# Patient Record
Sex: FEMALE | Race: OTHER | Employment: PART TIME | URBAN - METROPOLITAN AREA
[De-identification: names, ages, dates, MRNs, and addresses within clinical notes are randomized per-mention and may not be internally consistent; named-entity substitution may affect disease eponyms.]

---

## 2017-01-04 ENCOUNTER — ROUTINE PRENATAL (OUTPATIENT)
Dept: OBGYN CLINIC | Facility: CLINIC | Age: 32
End: 2017-01-04

## 2017-01-04 VITALS
BODY MASS INDEX: 42 KG/M2 | HEART RATE: 103 BPM | WEIGHT: 263 LBS | DIASTOLIC BLOOD PRESSURE: 81 MMHG | SYSTOLIC BLOOD PRESSURE: 128 MMHG

## 2017-01-04 DIAGNOSIS — Z34.92 ENCOUNTER FOR SUPERVISION OF NORMAL PREGNANCY IN SECOND TRIMESTER, UNSPECIFIED GRAVIDITY: Primary | ICD-10-CM

## 2017-01-04 LAB
APPEARANCE: CLEAR
MULTISTIX EXPIRATION DATE: NORMAL DATE
MULTISTIX LOT#: NORMAL NUMERIC
PH, URINE: 5 (ref 4.5–8)
SPECIFIC GRAVITY: 1.03 (ref 1–1.03)
URINE-COLOR: YELLOW

## 2017-01-04 NOTE — PROGRESS NOTES
Nausea improving and no longer vomiting. Not taking Diclegis anymore since makes her too tired. She is eating small snacks frequently which is working well and she has now gained weight. US ordered today.  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

## 2017-01-25 ENCOUNTER — HOSPITAL ENCOUNTER (OUTPATIENT)
Dept: ULTRASOUND IMAGING | Age: 32
Discharge: HOME | End: 2017-01-25
Attending: OBSTETRICS & GYNECOLOGY
Payer: COMMERCIAL

## 2017-01-25 DIAGNOSIS — Z34.92 ENCOUNTER FOR SUPERVISION OF NORMAL PREGNANCY IN SECOND TRIMESTER, UNSPECIFIED GRAVIDITY: ICD-10-CM

## 2017-01-25 PROCEDURE — 76805 OB US >/= 14 WKS SNGL FETUS: CPT

## 2017-02-01 ENCOUNTER — TELEPHONE (OUTPATIENT)
Dept: OBGYN CLINIC | Facility: CLINIC | Age: 32
End: 2017-02-01

## 2017-02-01 ENCOUNTER — ROUTINE PRENATAL (OUTPATIENT)
Dept: OBGYN CLINIC | Facility: CLINIC | Age: 32
End: 2017-02-01

## 2017-02-01 VITALS
WEIGHT: 263 LBS | HEART RATE: 88 BPM | DIASTOLIC BLOOD PRESSURE: 73 MMHG | BODY MASS INDEX: 42 KG/M2 | SYSTOLIC BLOOD PRESSURE: 111 MMHG

## 2017-02-01 DIAGNOSIS — Z34.92 ENCOUNTER FOR SUPERVISION OF NORMAL PREGNANCY IN SECOND TRIMESTER, UNSPECIFIED GRAVIDITY: Primary | ICD-10-CM

## 2017-02-01 LAB
APPEARANCE: CLEAR
MULTISTIX EXPIRATION DATE: NORMAL DATE
MULTISTIX LOT#: NORMAL NUMERIC
PH, URINE: 5 (ref 4.5–8)
SPECIFIC GRAVITY: 1 (ref 1–1.03)
URINE-COLOR: YELLOW

## 2017-02-19 ENCOUNTER — HOSPITAL ENCOUNTER (OUTPATIENT)
Age: 32
Discharge: HOME | End: 2017-02-19
Attending: FAMILY MEDICINE
Payer: COMMERCIAL

## 2017-02-19 VITALS
BODY MASS INDEX: 42.43 KG/M2 | TEMPERATURE: 98 F | HEIGHT: 66 IN | RESPIRATION RATE: 19 BRPM | WEIGHT: 264 LBS | HEART RATE: 109 BPM | DIASTOLIC BLOOD PRESSURE: 84 MMHG | OXYGEN SATURATION: 100 % | SYSTOLIC BLOOD PRESSURE: 136 MMHG

## 2017-02-19 DIAGNOSIS — J11.1 INFLUENZA: Primary | ICD-10-CM

## 2017-02-19 LAB
FLUAV + FLUBV RNA SPEC NAA+PROBE: NEGATIVE

## 2017-02-19 PROCEDURE — 87631 RESP VIRUS 3-5 TARGETS: CPT | Performed by: FAMILY MEDICINE

## 2017-02-19 PROCEDURE — 99203 OFFICE O/P NEW LOW 30 MIN: CPT

## 2017-02-19 PROCEDURE — 99204 OFFICE O/P NEW MOD 45 MIN: CPT

## 2017-02-19 PROCEDURE — 99213 OFFICE O/P EST LOW 20 MIN: CPT

## 2017-02-19 RX ORDER — ALBUTEROL SULFATE 90 UG/1
2 AEROSOL, METERED RESPIRATORY (INHALATION) EVERY 4 HOURS PRN
Qty: 1 INHALER | Refills: 0 | Status: SHIPPED | OUTPATIENT
Start: 2017-02-19 | End: 2017-02-27

## 2017-02-19 RX ORDER — RANITIDINE 150 MG/1
150 TABLET ORAL 2 TIMES DAILY
COMMUNITY
End: 2017-11-21 | Stop reason: ALTCHOICE

## 2017-02-19 NOTE — ED PROVIDER NOTES
Patient Seen in: [address]    History   Patient presents with:  Cough/URI    Stated Complaint: cough congestion     Patient is a 27year old female presenting with fever. The history is provided by the patient.    Fever Negative for shortness of breath. Gastrointestinal: Negative for nausea, vomiting and diarrhea. Musculoskeletal: Positive for myalgias. Skin: Negative for rash. Neurological: Positive for headaches. All other systems reviewed and are negative. Clinical Impression:  Influenza  (primary encounter diagnosis)    Disposition:  Discharge    Follow-up:  Carlos Bearden MD  [address] [facility] [address] [phone]    In 5 days        Medications Prescribed:  Discharge Medication

## 2017-02-19 NOTE — ED INITIAL ASSESSMENT (HPI)
Pt presents to the IC with c/o fver of 102 last night, cough, expiratory wheeze and nasal congestion. Pt notes sore throat and bilateral ear discomfort.

## 2017-02-24 ENCOUNTER — APPOINTMENT (OUTPATIENT)
Dept: LAB | Age: 32
End: 2017-02-24
Attending: OBSTETRICS & GYNECOLOGY
Payer: COMMERCIAL

## 2017-02-24 DIAGNOSIS — Z34.92 ENCOUNTER FOR SUPERVISION OF NORMAL PREGNANCY IN SECOND TRIMESTER, UNSPECIFIED GRAVIDITY: ICD-10-CM

## 2017-02-24 LAB
ERYTHROCYTE [DISTWIDTH] IN BLOOD BY AUTOMATED COUNT: 14.2 % (ref 11–15)
GLUCOSE 1H P 50 G GLC PO SERPL-MCNC: 105 MG/DL
HCT VFR BLD AUTO: 33.2 % (ref 35–48)
HGB BLD-MCNC: 10.9 G/DL (ref 12–16)
MCH RBC QN AUTO: 27.4 PG (ref 27–32)
MCHC RBC AUTO-ENTMCNC: 32.9 G/DL (ref 32–37)
MCV RBC AUTO: 83.3 FL (ref 80–100)
PLATELET # BLD AUTO: 332 K/UL (ref 140–400)
PMV BLD AUTO: 8.5 FL (ref 7.4–10.3)
RBC # BLD AUTO: 3.99 M/UL (ref 3.7–5.4)
WBC # BLD AUTO: 15.3 K/UL (ref 4–11)

## 2017-02-24 PROCEDURE — 36415 COLL VENOUS BLD VENIPUNCTURE: CPT

## 2017-02-24 PROCEDURE — 82950 GLUCOSE TEST: CPT

## 2017-02-24 PROCEDURE — 85027 COMPLETE CBC AUTOMATED: CPT

## 2017-02-27 ENCOUNTER — ROUTINE PRENATAL (OUTPATIENT)
Dept: OBGYN CLINIC | Facility: CLINIC | Age: 32
End: 2017-02-27

## 2017-02-27 VITALS
WEIGHT: 266.19 LBS | HEART RATE: 94 BPM | SYSTOLIC BLOOD PRESSURE: 121 MMHG | BODY MASS INDEX: 43 KG/M2 | DIASTOLIC BLOOD PRESSURE: 77 MMHG

## 2017-02-27 DIAGNOSIS — Z34.92 ENCOUNTER FOR SUPERVISION OF NORMAL PREGNANCY IN SECOND TRIMESTER, UNSPECIFIED GRAVIDITY: Primary | ICD-10-CM

## 2017-02-27 PROBLEM — Z34.90 NORMAL PRENATAL ULTRASOUND (HCC): Status: ACTIVE | Noted: 2017-02-27

## 2017-02-27 PROBLEM — Z34.90 NORMAL PRENATAL ULTRASOUND: Status: ACTIVE | Noted: 2017-02-27

## 2017-02-27 LAB
MULTISTIX LOT#: NORMAL NUMERIC
PH, URINE: 5 (ref 4.5–8)
PROTEIN (URINE DIPSTICK): 30 MG/DL
SPECIFIC GRAVITY: 1.02 (ref 1–1.03)
UROBILINOGEN,SEMI-QN: 0.2 MG/DL (ref 0–1.9)

## 2017-03-14 ENCOUNTER — ROUTINE PRENATAL (OUTPATIENT)
Dept: OBGYN CLINIC | Facility: CLINIC | Age: 32
End: 2017-03-14

## 2017-03-14 VITALS
BODY MASS INDEX: 43 KG/M2 | SYSTOLIC BLOOD PRESSURE: 116 MMHG | DIASTOLIC BLOOD PRESSURE: 73 MMHG | HEART RATE: 90 BPM | WEIGHT: 264.81 LBS

## 2017-03-14 DIAGNOSIS — Z34.93 ENCOUNTER FOR SUPERVISION OF NORMAL PREGNANCY IN THIRD TRIMESTER, UNSPECIFIED GRAVIDITY: Primary | ICD-10-CM

## 2017-03-14 LAB
MULTISTIX LOT#: NORMAL NUMERIC
SPECIFIC GRAVITY: 1.02 (ref 1–1.03)
UROBILINOGEN,SEMI-QN: 0.2 MG/DL (ref 0–1.9)

## 2017-03-14 PROCEDURE — 90471 IMMUNIZATION ADMIN: CPT | Performed by: OBSTETRICS & GYNECOLOGY

## 2017-03-14 PROCEDURE — 90715 TDAP VACCINE 7 YRS/> IM: CPT | Performed by: OBSTETRICS & GYNECOLOGY

## 2017-03-29 ENCOUNTER — ROUTINE PRENATAL (OUTPATIENT)
Dept: OBGYN CLINIC | Facility: CLINIC | Age: 32
End: 2017-03-29

## 2017-03-29 VITALS
DIASTOLIC BLOOD PRESSURE: 75 MMHG | SYSTOLIC BLOOD PRESSURE: 111 MMHG | HEART RATE: 103 BPM | BODY MASS INDEX: 44 KG/M2 | WEIGHT: 270 LBS

## 2017-03-29 DIAGNOSIS — Z34.93 ENCOUNTER FOR SUPERVISION OF NORMAL PREGNANCY IN THIRD TRIMESTER, UNSPECIFIED GRAVIDITY: Primary | ICD-10-CM

## 2017-03-29 LAB
MULTISTIX LOT#: NORMAL NUMERIC
PH, URINE: 5 (ref 4.5–8)
SPECIFIC GRAVITY: 1.02 (ref 1–1.03)

## 2017-04-05 ENCOUNTER — HOSPITAL ENCOUNTER (OUTPATIENT)
Dept: ULTRASOUND IMAGING | Age: 32
Discharge: HOME | End: 2017-04-05
Attending: OBSTETRICS & GYNECOLOGY
Payer: COMMERCIAL

## 2017-04-05 DIAGNOSIS — Z34.93 ENCOUNTER FOR SUPERVISION OF NORMAL PREGNANCY IN THIRD TRIMESTER, UNSPECIFIED GRAVIDITY: ICD-10-CM

## 2017-04-05 PROCEDURE — 76816 OB US FOLLOW-UP PER FETUS: CPT

## 2017-04-10 ENCOUNTER — ROUTINE PRENATAL (OUTPATIENT)
Dept: OBGYN CLINIC | Facility: CLINIC | Age: 32
End: 2017-04-10

## 2017-04-10 VITALS
BODY MASS INDEX: 43 KG/M2 | DIASTOLIC BLOOD PRESSURE: 72 MMHG | HEART RATE: 94 BPM | SYSTOLIC BLOOD PRESSURE: 108 MMHG | WEIGHT: 268.38 LBS

## 2017-04-10 DIAGNOSIS — Z34.93 ENCOUNTER FOR SUPERVISION OF NORMAL PREGNANCY IN THIRD TRIMESTER, UNSPECIFIED GRAVIDITY: Primary | ICD-10-CM

## 2017-04-20 ENCOUNTER — PATIENT MESSAGE (OUTPATIENT)
Dept: OBGYN CLINIC | Facility: CLINIC | Age: 32
End: 2017-04-20

## 2017-04-20 NOTE — TELEPHONE ENCOUNTER
From: Camden Spear  To: Yury Barnes MD  Sent: 4/20/2017 3:20 PM CDT  Subject: Prescription Question    Hi. I've been dealing with a bad yeast infection for the past week now.  I did finish the coarse of over the counter Monistat 7 but my symptoms are

## 2017-04-21 RX ORDER — FLUCONAZOLE 150 MG/1
150 TABLET ORAL ONCE
Qty: 1 TABLET | Refills: 0 | Status: SHIPPED | OUTPATIENT
Start: 2017-04-21 | End: 2017-04-21

## 2017-04-24 ENCOUNTER — ROUTINE PRENATAL (OUTPATIENT)
Dept: OBGYN CLINIC | Facility: CLINIC | Age: 32
End: 2017-04-24

## 2017-04-24 VITALS
WEIGHT: 268.19 LBS | HEART RATE: 92 BPM | DIASTOLIC BLOOD PRESSURE: 65 MMHG | SYSTOLIC BLOOD PRESSURE: 117 MMHG | BODY MASS INDEX: 43 KG/M2

## 2017-04-24 DIAGNOSIS — Z34.83 ENCOUNTER FOR SUPERVISION OF OTHER NORMAL PREGNANCY IN THIRD TRIMESTER: Primary | ICD-10-CM

## 2017-04-28 ENCOUNTER — HOSPITAL ENCOUNTER (OUTPATIENT)
Dept: ULTRASOUND IMAGING | Age: 32
Discharge: HOME | End: 2017-04-28
Attending: OBSTETRICS & GYNECOLOGY
Payer: COMMERCIAL

## 2017-04-28 ENCOUNTER — APPOINTMENT (OUTPATIENT)
Dept: LAB | Age: 32
End: 2017-04-28
Attending: OBSTETRICS & GYNECOLOGY
Payer: COMMERCIAL

## 2017-04-28 DIAGNOSIS — Z34.93 ENCOUNTER FOR SUPERVISION OF NORMAL PREGNANCY IN THIRD TRIMESTER, UNSPECIFIED GRAVIDITY: ICD-10-CM

## 2017-04-28 PROCEDURE — 85027 COMPLETE CBC AUTOMATED: CPT

## 2017-04-28 PROCEDURE — 86780 TREPONEMA PALLIDUM: CPT

## 2017-04-28 PROCEDURE — 76816 OB US FOLLOW-UP PER FETUS: CPT

## 2017-04-28 PROCEDURE — 36415 COLL VENOUS BLD VENIPUNCTURE: CPT

## 2017-05-01 ENCOUNTER — ROUTINE PRENATAL (OUTPATIENT)
Dept: OBGYN CLINIC | Facility: CLINIC | Age: 32
End: 2017-05-01

## 2017-05-01 VITALS
WEIGHT: 272 LBS | DIASTOLIC BLOOD PRESSURE: 71 MMHG | SYSTOLIC BLOOD PRESSURE: 111 MMHG | HEART RATE: 93 BPM | BODY MASS INDEX: 44 KG/M2

## 2017-05-01 DIAGNOSIS — Z34.93 ENCOUNTER FOR SUPERVISION OF NORMAL PREGNANCY IN THIRD TRIMESTER, UNSPECIFIED GRAVIDITY: Primary | ICD-10-CM

## 2017-05-10 ENCOUNTER — ROUTINE PRENATAL (OUTPATIENT)
Dept: OBGYN CLINIC | Facility: CLINIC | Age: 32
End: 2017-05-10

## 2017-05-10 VITALS
HEART RATE: 90 BPM | WEIGHT: 272.63 LBS | DIASTOLIC BLOOD PRESSURE: 70 MMHG | SYSTOLIC BLOOD PRESSURE: 108 MMHG | BODY MASS INDEX: 44 KG/M2

## 2017-05-10 DIAGNOSIS — Z34.93 ENCOUNTER FOR SUPERVISION OF NORMAL PREGNANCY IN THIRD TRIMESTER, UNSPECIFIED GRAVIDITY: Primary | ICD-10-CM

## 2017-05-11 ENCOUNTER — HOSPITAL ENCOUNTER (OUTPATIENT)
Facility: HOSPITAL | Age: 32
Setting detail: OBSERVATION
Discharge: HOME | End: 2017-05-11
Attending: OBSTETRICS & GYNECOLOGY | Admitting: OBSTETRICS & GYNECOLOGY
Payer: COMMERCIAL

## 2017-05-11 VITALS — HEART RATE: 98 BPM | SYSTOLIC BLOOD PRESSURE: 111 MMHG | DIASTOLIC BLOOD PRESSURE: 67 MMHG

## 2017-05-11 PROBLEM — O36.8190 DECREASED FETAL MOVEMENT: Status: ACTIVE | Noted: 2017-05-11

## 2017-05-11 PROBLEM — O36.8190 DECREASED FETAL MOVEMENT (HCC): Status: ACTIVE | Noted: 2017-05-11

## 2017-05-11 PROCEDURE — 59025 FETAL NON-STRESS TEST: CPT | Performed by: OBSTETRICS & GYNECOLOGY

## 2017-05-12 NOTE — TRIAGE
[facility] HOSP - [facility]      Triage Note    Jose E Loera Patient Status:  Observation    1985 MRN [id number]   Location [address] C-D Attending Stephania Toussaint MD   Hosp Day # 0 PCP MD Larissa Walters: [id number]   Est MD    5/17/2017    1:19 PM

## 2017-05-16 ENCOUNTER — ROUTINE PRENATAL (OUTPATIENT)
Dept: OBGYN CLINIC | Facility: CLINIC | Age: 32
End: 2017-05-16

## 2017-05-16 VITALS
WEIGHT: 272 LBS | SYSTOLIC BLOOD PRESSURE: 109 MMHG | DIASTOLIC BLOOD PRESSURE: 72 MMHG | HEART RATE: 94 BPM | BODY MASS INDEX: 44 KG/M2

## 2017-05-16 DIAGNOSIS — Z34.93 ENCOUNTER FOR SUPERVISION OF NORMAL PREGNANCY IN THIRD TRIMESTER, UNSPECIFIED GRAVIDITY: Primary | ICD-10-CM

## 2017-05-22 ENCOUNTER — HOSPITAL ENCOUNTER (OUTPATIENT)
Facility: HOSPITAL | Age: 32
Setting detail: OBSERVATION
Discharge: HOME | End: 2017-05-22
Attending: OBSTETRICS & GYNECOLOGY | Admitting: OBSTETRICS & GYNECOLOGY
Payer: COMMERCIAL

## 2017-05-22 ENCOUNTER — TELEPHONE (OUTPATIENT)
Dept: OBGYN CLINIC | Facility: CLINIC | Age: 32
End: 2017-05-22

## 2017-05-22 VITALS — HEART RATE: 98 BPM | DIASTOLIC BLOOD PRESSURE: 73 MMHG | SYSTOLIC BLOOD PRESSURE: 129 MMHG

## 2017-05-22 PROBLEM — O47.9 IRREGULAR CONTRACTIONS: Status: ACTIVE | Noted: 2017-05-22

## 2017-05-22 PROBLEM — O47.9 IRREGULAR CONTRACTIONS (HCC): Status: ACTIVE | Noted: 2017-05-22

## 2017-05-22 PROCEDURE — 59025 FETAL NON-STRESS TEST: CPT | Performed by: OBSTETRICS & GYNECOLOGY

## 2017-05-22 NOTE — TRIAGE
[facility]      Triage Note    Celia Florian Patient Status:  Observation    1985 MRN [id number]   Location [address] C-D Attending Krystal Robles, [address] Day # 0 PCP MD Davonte Saleh Brow: [id number]

## 2017-05-22 NOTE — TELEPHONE ENCOUNTER
Pt 38w6d calling to report contractions that have gotten increasingly stronger. Pt stated that contractions started around 11pm last night and having been coming consistently every 2-3 minutes, lasting 45-60 seconds.  Pt stated that last night she was able

## 2017-05-24 ENCOUNTER — ROUTINE PRENATAL (OUTPATIENT)
Dept: OBGYN CLINIC | Facility: CLINIC | Age: 32
End: 2017-05-24

## 2017-05-24 VITALS
WEIGHT: 271 LBS | BODY MASS INDEX: 44 KG/M2 | DIASTOLIC BLOOD PRESSURE: 75 MMHG | HEART RATE: 99 BPM | SYSTOLIC BLOOD PRESSURE: 120 MMHG

## 2017-05-24 DIAGNOSIS — Z34.93 ENCOUNTER FOR SUPERVISION OF NORMAL PREGNANCY IN THIRD TRIMESTER, UNSPECIFIED GRAVIDITY: Primary | ICD-10-CM

## 2017-05-24 PROCEDURE — 59426 ANTEPARTUM CARE ONLY: CPT | Performed by: OBSTETRICS & GYNECOLOGY

## 2017-05-27 ENCOUNTER — HOSPITAL ENCOUNTER (INPATIENT)
Facility: HOSPITAL | Age: 32
LOS: 1 days | Discharge: HOME | End: 2017-05-28
Attending: OBSTETRICS & GYNECOLOGY | Admitting: OBSTETRICS & GYNECOLOGY
Payer: COMMERCIAL

## 2017-05-27 ENCOUNTER — ANESTHESIA (OUTPATIENT)
Dept: OBGYN UNIT | Facility: HOSPITAL | Age: 32
End: 2017-05-27
Payer: COMMERCIAL

## 2017-05-27 ENCOUNTER — ANESTHESIA EVENT (OUTPATIENT)
Dept: OBGYN UNIT | Facility: HOSPITAL | Age: 32
End: 2017-05-27
Payer: COMMERCIAL

## 2017-05-27 PROBLEM — Z34.90 PREGNANCY (HCC): Status: ACTIVE | Noted: 2017-05-27

## 2017-05-27 PROBLEM — Z34.90 PREGNANCY: Status: ACTIVE | Noted: 2017-05-27

## 2017-05-27 PROCEDURE — 59410 OBSTETRICAL CARE: CPT | Performed by: OBSTETRICS & GYNECOLOGY

## 2017-05-27 RX ORDER — PRENATAL VIT,CAL 76/IRON/FOLIC 29 MG-1 MG
1 TABLET ORAL DAILY
Status: DISCONTINUED | OUTPATIENT
Start: 2017-05-27 | End: 2017-05-28

## 2017-05-27 RX ORDER — DIAPER,BRIEF,INFANT-TODD,DISP
1 EACH MISCELLANEOUS EVERY 6 HOURS PRN
Status: DISCONTINUED | OUTPATIENT
Start: 2017-05-27 | End: 2017-05-28

## 2017-05-27 RX ORDER — EPHEDRINE SULFATE/0.9% NACL/PF 25 MG/5 ML
SYRINGE (ML) INTRAVENOUS
Status: DISCONTINUED
Start: 2017-05-27 | End: 2017-05-27 | Stop reason: WASHOUT

## 2017-05-27 RX ORDER — SODIUM CHLORIDE 0.9 % (FLUSH) 0.9 %
10 SYRINGE (ML) INJECTION AS NEEDED
Status: DISCONTINUED | OUTPATIENT
Start: 2017-05-27 | End: 2017-05-27 | Stop reason: HOSPADM

## 2017-05-27 RX ORDER — AMMONIA INHALANTS 0.04 G/.3ML
0.3 INHALANT RESPIRATORY (INHALATION) AS NEEDED
Status: DISCONTINUED | OUTPATIENT
Start: 2017-05-27 | End: 2017-05-28

## 2017-05-27 RX ORDER — NALBUPHINE HCL 10 MG/ML
2.5 AMPUL (ML) INJECTION
Status: DISCONTINUED | OUTPATIENT
Start: 2017-05-27 | End: 2017-05-28

## 2017-05-27 RX ORDER — BUPIVACAINE HYDROCHLORIDE 2.5 MG/ML
INJECTION, SOLUTION EPIDURAL; INFILTRATION; INTRACAUDAL
Status: DISPENSED
Start: 2017-05-27 | End: 2017-05-27

## 2017-05-27 RX ORDER — TERBUTALINE SULFATE 1 MG/ML
0.25 INJECTION, SOLUTION SUBCUTANEOUS AS NEEDED
Status: DISCONTINUED | OUTPATIENT
Start: 2017-05-27 | End: 2017-05-27 | Stop reason: HOSPADM

## 2017-05-27 RX ORDER — DEXTROSE, SODIUM CHLORIDE, SODIUM LACTATE, POTASSIUM CHLORIDE, AND CALCIUM CHLORIDE 5; .6; .31; .03; .02 G/100ML; G/100ML; G/100ML; G/100ML; G/100ML
INJECTION, SOLUTION INTRAVENOUS
Status: COMPLETED
Start: 2017-05-27 | End: 2017-05-27

## 2017-05-27 RX ORDER — HYDROCODONE BITARTRATE AND ACETAMINOPHEN 5; 325 MG/1; MG/1
1 TABLET ORAL EVERY 6 HOURS PRN
Status: DISCONTINUED | OUTPATIENT
Start: 2017-05-27 | End: 2017-05-28

## 2017-05-27 RX ORDER — EPHEDRINE SULFATE/0.9% NACL/PF 25 MG/5 ML
5 SYRINGE (ML) INTRAVENOUS AS NEEDED
Status: DISCONTINUED | OUTPATIENT
Start: 2017-05-27 | End: 2017-05-28

## 2017-05-27 RX ORDER — PHENYLEPHRINE HCL IN 0.9% NACL 0.5 MG/5ML
SYRINGE (ML) INTRAVENOUS
Status: DISCONTINUED
Start: 2017-05-27 | End: 2017-05-27 | Stop reason: WASHOUT

## 2017-05-27 RX ORDER — BUPIVACAINE HYDROCHLORIDE 2.5 MG/ML
INJECTION, SOLUTION EPIDURAL; INFILTRATION; INTRACAUDAL AS NEEDED
Status: DISCONTINUED | OUTPATIENT
Start: 2017-05-27 | End: 2017-05-27 | Stop reason: SURG

## 2017-05-27 RX ORDER — DEXTROSE, SODIUM CHLORIDE, SODIUM LACTATE, POTASSIUM CHLORIDE, AND CALCIUM CHLORIDE 5; .6; .31; .03; .02 G/100ML; G/100ML; G/100ML; G/100ML; G/100ML
125 INJECTION, SOLUTION INTRAVENOUS CONTINUOUS
Status: DISCONTINUED | OUTPATIENT
Start: 2017-05-27 | End: 2017-05-27 | Stop reason: HOSPADM

## 2017-05-27 RX ORDER — AMMONIA INHALANTS 0.04 G/.3ML
0.3 INHALANT RESPIRATORY (INHALATION) AS NEEDED
Status: DISCONTINUED | OUTPATIENT
Start: 2017-05-27 | End: 2017-05-27 | Stop reason: HOSPADM

## 2017-05-27 RX ORDER — IBUPROFEN 600 MG/1
600 TABLET ORAL ONCE AS NEEDED
Status: DISCONTINUED | OUTPATIENT
Start: 2017-05-27 | End: 2017-05-27 | Stop reason: HOSPADM

## 2017-05-27 RX ORDER — SODIUM CHLORIDE, SODIUM LACTATE, POTASSIUM CHLORIDE, CALCIUM CHLORIDE 600; 310; 30; 20 MG/100ML; MG/100ML; MG/100ML; MG/100ML
INJECTION, SOLUTION INTRAVENOUS CONTINUOUS
Status: DISCONTINUED | OUTPATIENT
Start: 2017-05-27 | End: 2017-05-27 | Stop reason: HOSPADM

## 2017-05-27 RX ORDER — DOCUSATE SODIUM 100 MG/1
100 CAPSULE, LIQUID FILLED ORAL 2 TIMES DAILY
Status: DISCONTINUED | OUTPATIENT
Start: 2017-05-27 | End: 2017-05-28

## 2017-05-27 RX ORDER — SIMETHICONE 80 MG
80 TABLET,CHEWABLE ORAL 3 TIMES DAILY PRN
Status: DISCONTINUED | OUTPATIENT
Start: 2017-05-27 | End: 2017-05-28

## 2017-05-27 RX ORDER — ONDANSETRON 2 MG/ML
4 INJECTION INTRAMUSCULAR; INTRAVENOUS EVERY 6 HOURS PRN
Status: DISCONTINUED | OUTPATIENT
Start: 2017-05-27 | End: 2017-05-27

## 2017-05-27 RX ORDER — EPHEDRINE SULFATE/0.9% NACL/PF 25 MG/5 ML
SYRINGE (ML) INTRAVENOUS
Status: DISPENSED
Start: 2017-05-27 | End: 2017-05-27

## 2017-05-27 RX ORDER — LIDOCAINE HYDROCHLORIDE 10 MG/ML
30 INJECTION, SOLUTION EPIDURAL; INFILTRATION; INTRACAUDAL; PERINEURAL ONCE
Status: DISCONTINUED | OUTPATIENT
Start: 2017-05-27 | End: 2017-05-27 | Stop reason: HOSPADM

## 2017-05-27 RX ORDER — ONDANSETRON 2 MG/ML
4 INJECTION INTRAMUSCULAR; INTRAVENOUS EVERY 6 HOURS PRN
Status: DISCONTINUED | OUTPATIENT
Start: 2017-05-27 | End: 2017-05-28

## 2017-05-27 RX ORDER — IBUPROFEN 600 MG/1
600 TABLET ORAL EVERY 6 HOURS PRN
Status: DISCONTINUED | OUTPATIENT
Start: 2017-05-27 | End: 2017-05-28

## 2017-05-27 RX ORDER — BISACODYL 10 MG
10 SUPPOSITORY, RECTAL RECTAL ONCE AS NEEDED
Status: DISCONTINUED | OUTPATIENT
Start: 2017-05-27 | End: 2017-05-28

## 2017-05-27 RX ORDER — SODIUM CHLORIDE, SODIUM LACTATE, POTASSIUM CHLORIDE, CALCIUM CHLORIDE 600; 310; 30; 20 MG/100ML; MG/100ML; MG/100ML; MG/100ML
INJECTION, SOLUTION INTRAVENOUS
Status: COMPLETED
Start: 2017-05-27 | End: 2017-05-27

## 2017-05-27 RX ORDER — SODIUM CHLORIDE 0.9 % (FLUSH) 0.9 %
10 SYRINGE (ML) INJECTION AS NEEDED
Status: DISCONTINUED | OUTPATIENT
Start: 2017-05-27 | End: 2017-05-28

## 2017-05-27 RX ADMIN — BUPIVACAINE HYDROCHLORIDE 10 ML: 2.5 INJECTION, SOLUTION EPIDURAL; INFILTRATION; INTRACAUDAL at 11:26:00

## 2017-05-27 NOTE — DISCHARGE SUMMARY
[facility] HOSP - [facility]    Discharge Summary    Martha Deleon Patient Status:  Inpatient    1985 MRN [id number]   Location [address] C-D Attending Janusz Ann MD   Hosp Day # 0       Admit date:  2017    Discharge date:

## 2017-05-27 NOTE — PROGRESS NOTES
Patient up to bathroom with assist x 2. Voided 350 ml   Patient transferred to mother/baby room # 350 per wheelchair in stable condition with baby and personal belongings. Accompanied by significant other and staff. Report given to mother/baby RN.

## 2017-05-27 NOTE — PROGRESS NOTES
Arrived to find baby had delivered w/in 5 min of RN notifing me that patient complete at 46  -- baby delivered by [phone]. Per in house doctor baby's head delivered even before midwife assisted with delivery. Pt did not push at all. No tears.

## 2017-05-27 NOTE — ANESTHESIA PROCEDURE NOTES
Labor Analgesia  Performed by: Allan Gauthier  Authorized by: Allan Gauthier    Patient Location:  OB  Start Time:  5/27/2017 11:55 AM  End Time:  5/27/2017 11:25 AM  Reason for Block: labor epidural    Anesthesiologist:  Allan Gauthier  Per

## 2017-05-27 NOTE — H&P
[address] [facility] Patient Status:  Inpatient    1985 MRN [id number]   Location [address] [facility] [address] C-D Attending Simon Sam MD   Hosp Day # 0 PCP Abdias Del Rosario MD     Date of Admission:   Quit date: 02/03/2009    Smokeless tobacco: Former User    Comment: per [address] - no    Alcohol Use: No    Comment: None. Allergies/Medications:    Allergies:   No Known Allergies    Medications:    Prescriptions prior to admission:  IRON, FERROUS S SROM    Obstetrical history significant for obesity.  Patient Active Problem List:     Vertigo     Gastroesophageal reflux disease without esophagitis     BMI 40.0-44.9, adult (HCC)     ultrasound     Decreased fetal movement     Irregular contractions

## 2017-05-27 NOTE — ANESTHESIA PREPROCEDURE EVALUATION
Anesthesia PreOp Note    HPI:     Maya Kim is a 27year old female who presents for preoperative consultation requested by: * No surgeons listed *    Date of Surgery: 5/27/2017    * No procedures listed *  Indication: * No pre-op diagnosis entered * 125 mL/hr at 05/27/17 0715   Lidocaine HCl (PF) (XYLOCAINE) 1 % injection SOLN 30 mL 30 mL Intradermal Once Indu MD Jose     ibuprofen (MOTRIN) tab 600 mg 600 mg Oral Once PRN Indu Garcia MD     oxyTOCIN (PITOCIN) 30 units/ 500 ml premix infusion Name: N/A    Years of Education: N/A  Number of Children: 1     Occupational History  RN Oncology  [employer]     Social History Main Topics   Smoking status: Former Smoker     Quit date: 02/03/2009    Smokeless tobacco: Former User    Comment: per [employer] - no the best of my ability. The patient desires the anesthetic management as planned.   RHETT VANESSA  5/27/2017 11:59 AM

## 2017-05-27 NOTE — L&D DELIVERY NOTE
Mother's Information           [facility]  [[id number]]     Labor Events     labor?:  No    steroids?:  None   Antibiotics received during labor?:  No   Rupture date:  17  Rupture time:  0500   Rupture type:  SROM   Fluid color:  Charisse tone Limp Some flexion Active motion    Respiratory effort Absent Weak cry; hypoventilation Good, crying                    1 Minute:   5 Minute:   10 Minute:   15 Minute:   20 Minute:     Skin color:   Heart rate:   Reflex irritability:   Muscle tone:   R

## 2017-05-27 NOTE — PROGRESS NOTES
Noted head  and voluntarily pushing @ 1402,called for help,cecilia Gomez cnm  And MARIE fajardo Rn incharge to  immediately and delivered the baby @ 1404,and Dr Anna Lawrence also to .placenta was delivered by dr Anna Lawrence @ 0006. Lalita Velasquez

## 2017-05-28 VITALS
DIASTOLIC BLOOD PRESSURE: 73 MMHG | SYSTOLIC BLOOD PRESSURE: 112 MMHG | OXYGEN SATURATION: 100 % | HEART RATE: 80 BPM | TEMPERATURE: 98 F | RESPIRATION RATE: 14 BRPM

## 2017-05-28 NOTE — ANESTHESIA POSTPROCEDURE EVALUATION
Patient: Maya Kim    Procedure Summary     Date Anesthesia Start Anesthesia Stop Room / Location    05/27/17 1093 0586        Procedure Diagnosis Scheduled Providers Responsible Provider    LABOR ANALGESIA No diagnosis on file.   Carissa Huffman

## 2017-05-28 NOTE — LACTATION NOTE
This note was copied from the chart of [address].   LACTATION NOTE - INFANT    Evaluation Type  Evaluation Type: Inpatient    Problems & Assessment  Problems Diagnosed or Identified: Shallow latch  Problems: comment/detail: tongue thrust  Infant Assessmen

## 2017-05-28 NOTE — SPIRITUAL CARE NOTE
Pt was inprocess of leaving the hospital.  She was already at the [facility].  ran down to [facility]. Offered congratulations and best wishes for the baby boy. 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   She was in labor 5 days before the birth, so now she only

## 2017-05-28 NOTE — LACTATION NOTE
LACTATION NOTE - MOTHER                Maternal history  Maternal history: Polycystic ovarian syndrome (PCOS);Obesity; Anxiety;Depression  Other/comment: Benign brain tumor    Breastfeeding goal  Breastfeeding goal: To maintain breast milk feeding per patie

## 2017-07-10 ENCOUNTER — POSTPARTUM (OUTPATIENT)
Dept: OBGYN CLINIC | Facility: CLINIC | Age: 32
End: 2017-07-10

## 2017-07-10 VITALS
HEART RATE: 88 BPM | DIASTOLIC BLOOD PRESSURE: 77 MMHG | WEIGHT: 252 LBS | SYSTOLIC BLOOD PRESSURE: 110 MMHG | BODY MASS INDEX: 41 KG/M2

## 2017-07-10 PROBLEM — Z34.90 NORMAL PRENATAL ULTRASOUND: Status: RESOLVED | Noted: 2017-02-27 | Resolved: 2017-05-27

## 2017-07-10 PROBLEM — Z34.90 NORMAL PRENATAL ULTRASOUND (HCC): Status: RESOLVED | Noted: 2017-02-27 | Resolved: 2017-05-27

## 2017-07-11 NOTE — PROGRESS NOTES
HPI    Celia Florian is a 27year old female  here for 6 week post-partum visit. Patient delivered a  male infant on 17 via . Precipitous delivery -- baby caught by in house Patient desires nothing for contraception.   Patient is breast fee lb (114.3 kg), last menstrual period 09/01/2016, currently breastfeeding.   General:  Well nourished, well developed woman in no acute distress  Abdomen:  soft, nontender, no masses  External Genitalia: normal appearance, hair distribution, and no lesions

## 2017-09-17 ENCOUNTER — PATIENT MESSAGE (OUTPATIENT)
Dept: OBGYN CLINIC | Facility: CLINIC | Age: 32
End: 2017-09-17

## 2017-09-18 NOTE — TELEPHONE ENCOUNTER
From: Kieran Contreras  To: Lexie Morton DO  Sent: 9/17/2017 2:22 PM CDT  Subject: Prescription Question    I have another yeast infection. Can you please call in a dose of diflucan? Thanks!

## 2017-09-18 NOTE — TELEPHONE ENCOUNTER
I CALLED PT TO FIND OUT WHEN SHE LAST HAD DIFLUCAN. IT WAS GIVEN ON 4-21-17 BY MAREN. PT STATES OTC RADHAT NEVER WORKS FOR HER. WILL YOU FILL AGAIN OR DOES SHE NEED TO BE SEEN. THERE IS NO VAG CULTURE IN THE PAST YEAR DIAGNOSING A YEAST INFECTION.

## 2017-09-20 ENCOUNTER — PATIENT MESSAGE (OUTPATIENT)
Dept: OBGYN CLINIC | Facility: CLINIC | Age: 32
End: 2017-09-20

## 2017-09-20 RX ORDER — FLUCONAZOLE 150 MG/1
150 TABLET ORAL ONCE
Qty: 1 TABLET | Refills: 0 | Status: SHIPPED | OUTPATIENT
Start: 2017-09-20 | End: 2017-09-20

## 2017-09-20 NOTE — TELEPHONE ENCOUNTER
From: Marylen Neigh  To: Vilma Peterson DO  Sent: 9/20/2017 12:52 PM CDT  Subject: Prescription Question    No I am not breastfeeding.  Thank you

## 2017-10-09 ENCOUNTER — OFFICE VISIT (OUTPATIENT)
Dept: FAMILY MEDICINE CLINIC | Facility: CLINIC | Age: 32
End: 2017-10-09

## 2017-10-09 VITALS
HEIGHT: 66 IN | SYSTOLIC BLOOD PRESSURE: 121 MMHG | DIASTOLIC BLOOD PRESSURE: 69 MMHG | WEIGHT: 265 LBS | BODY MASS INDEX: 42.59 KG/M2 | TEMPERATURE: 98 F | HEART RATE: 84 BPM

## 2017-10-09 DIAGNOSIS — N76.0 ACUTE VAGINITIS: Primary | ICD-10-CM

## 2017-10-09 PROCEDURE — 99213 OFFICE O/P EST LOW 20 MIN: CPT | Performed by: PHYSICIAN ASSISTANT

## 2017-10-09 PROCEDURE — 99212 OFFICE O/P EST SF 10 MIN: CPT | Performed by: PHYSICIAN ASSISTANT

## 2017-10-09 RX ORDER — FLUCONAZOLE 150 MG/1
TABLET ORAL
Qty: 2 TABLET | Refills: 1 | Status: SHIPPED | OUTPATIENT
Start: 2017-10-09 | End: 2017-11-21 | Stop reason: ALTCHOICE

## 2017-10-09 NOTE — PROGRESS NOTES
HPI:    Patient ID: Usman Weaver is a 27year old female. Patient presents for itching and inflammation of vaginal area for past few days. She states has same symptoms she did few weeks ago that resolved within one day of taking oral fluconazole.   She or worsen.  -Patient would like to defer vaginal culture at this time. No orders of the defined types were placed in this encounter.       Meds This Visit:  Signed Prescriptions Disp Refills    fluconazole (DIFLUCAN) 150 MG Oral Tab 2 tablet 1      Sig

## 2017-11-13 ENCOUNTER — TELEPHONE (OUTPATIENT)
Dept: PEDIATRICS CLINIC | Facility: CLINIC | Age: 32
End: 2017-11-13

## 2017-11-13 DIAGNOSIS — Z34.90 PREGNANCY, UNSPECIFIED GESTATIONAL AGE: Primary | ICD-10-CM

## 2017-11-13 NOTE — TELEPHONE ENCOUNTER
Pt calling to report +HPT and LMP of 9/18/17. Pt delivered with our practice in May of this year. Pt familiar with rotating through all MDs.  Pt stated she stopped breastfeeding in September and then got her first period since delivery and then found out sh

## 2017-11-21 ENCOUNTER — HOSPITAL ENCOUNTER (OUTPATIENT)
Dept: ULTRASOUND IMAGING | Age: 32
Discharge: HOME | End: 2017-11-21
Attending: OBSTETRICS & GYNECOLOGY
Payer: COMMERCIAL

## 2017-11-21 ENCOUNTER — OFFICE VISIT (OUTPATIENT)
Dept: FAMILY MEDICINE CLINIC | Facility: CLINIC | Age: 32
End: 2017-11-21

## 2017-11-21 VITALS
HEART RATE: 89 BPM | HEIGHT: 66 IN | SYSTOLIC BLOOD PRESSURE: 117 MMHG | WEIGHT: 265 LBS | TEMPERATURE: 98 F | DIASTOLIC BLOOD PRESSURE: 70 MMHG | BODY MASS INDEX: 42.59 KG/M2

## 2017-11-21 DIAGNOSIS — Z34.90 PREGNANCY, UNSPECIFIED GESTATIONAL AGE: ICD-10-CM

## 2017-11-21 DIAGNOSIS — Z00.00 PHYSICAL EXAM, ANNUAL: Primary | ICD-10-CM

## 2017-11-21 PROCEDURE — 99395 PREV VISIT EST AGE 18-39: CPT | Performed by: FAMILY MEDICINE

## 2017-11-21 PROCEDURE — 76801 OB US < 14 WKS SINGLE FETUS: CPT | Performed by: OBSTETRICS & GYNECOLOGY

## 2017-11-21 RX ORDER — OMEPRAZOLE 20 MG/1
20 CAPSULE, DELAYED RELEASE ORAL
COMMUNITY
End: 2017-11-27

## 2017-11-21 NOTE — PROGRESS NOTES
HPI:    Patient ID: Carito Wilkins is a 27year old female. Doing well. Pregnant. No chest pain no shortness of breath. Review of Systems   Constitutional: Negative. Negative for activity change, appetite change and chills. HENT: Negative.

## 2017-11-24 ENCOUNTER — LAB ENCOUNTER (OUTPATIENT)
Dept: LAB | Age: 32
End: 2017-11-24
Attending: FAMILY MEDICINE
Payer: COMMERCIAL

## 2017-11-24 DIAGNOSIS — Z00.00 PHYSICAL EXAM, ANNUAL: ICD-10-CM

## 2017-11-24 PROCEDURE — 80053 COMPREHEN METABOLIC PANEL: CPT

## 2017-11-24 PROCEDURE — 36415 COLL VENOUS BLD VENIPUNCTURE: CPT

## 2017-11-24 PROCEDURE — 80061 LIPID PANEL: CPT

## 2017-11-27 ENCOUNTER — OFFICE VISIT (OUTPATIENT)
Dept: OBGYN CLINIC | Facility: CLINIC | Age: 32
End: 2017-11-27

## 2017-11-27 VITALS
BODY MASS INDEX: 42 KG/M2 | HEART RATE: 81 BPM | DIASTOLIC BLOOD PRESSURE: 75 MMHG | SYSTOLIC BLOOD PRESSURE: 122 MMHG | WEIGHT: 262.38 LBS

## 2017-11-27 DIAGNOSIS — N76.0 VAGINITIS AND VULVOVAGINITIS: Primary | ICD-10-CM

## 2017-11-27 PROCEDURE — 99213 OFFICE O/P EST LOW 20 MIN: CPT | Performed by: CLINICAL NURSE SPECIALIST

## 2017-11-27 NOTE — TELEPHONE ENCOUNTER
OBN VIA PC APPT SCHEDULED FOR TOMORROW. PT DELIVERED WITH US LAST YEAR. PT AWARE THE DR WILL ADDRESS HER ETHAN WHEN SHE IS SEEN.

## 2017-11-28 ENCOUNTER — NURSE ONLY (OUTPATIENT)
Dept: OBGYN CLINIC | Facility: CLINIC | Age: 32
End: 2017-11-28

## 2017-11-28 ENCOUNTER — TELEPHONE (OUTPATIENT)
Dept: FAMILY MEDICINE CLINIC | Facility: CLINIC | Age: 32
End: 2017-11-28

## 2017-11-28 VITALS — HEIGHT: 66 IN | BODY MASS INDEX: 42.27 KG/M2 | WEIGHT: 263 LBS

## 2017-11-28 DIAGNOSIS — Z34.81 ENCOUNTER FOR SUPERVISION OF OTHER NORMAL PREGNANCY IN FIRST TRIMESTER: Primary | ICD-10-CM

## 2017-11-28 RX ORDER — OMEPRAZOLE 20 MG/1
20 CAPSULE, DELAYED RELEASE ORAL DAILY
COMMUNITY
End: 2018-08-15

## 2017-11-28 NOTE — PROGRESS NOTES
Pt had [facility] PC appt today with no complaints. Normal PN labs ordered plus 1 hr gtt. Pt advised all labs must be completed and resulted prior to MD appt. Pt accepted new ob appt with [facility] on 12/7.      Partner's name is Romulo Trejo # [phone]; race SCREENING    Patient greater than 35 No    Canavan Disease  No    Cystic Fibrosis No    Down Syndrome No    Hemophilia No    [place]'s Chorea No    Mental Retardation/Autism No    Muscular Dystrophy No    Neural tube defects No    Sickle Cell Disease or

## 2017-11-29 RX ORDER — FLUCONAZOLE 150 MG/1
150 TABLET ORAL ONCE
Qty: 1 TABLET | Refills: 0 | Status: SHIPPED | OUTPATIENT
Start: 2017-11-29 | End: 2017-11-29

## 2017-11-29 NOTE — PROGRESS NOTES
Carito Wilkins is a 34year old female  Patient's last menstrual period was 2017. Patient presents with:  Gyn Problem: itching, inflammation, pain white discharge  Has been having itching and discharge on and off for 1 month.  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

## 2017-11-30 ENCOUNTER — TELEPHONE (OUTPATIENT)
Dept: OBGYN CLINIC | Facility: CLINIC | Age: 32
End: 2017-11-30

## 2017-11-30 NOTE — TELEPHONE ENCOUNTER
Informed pt that MAF stated that her vaginal culture is positive for yeast.  Informed pt that rx for Diflucan 150mg po x 1 sent to the pharmacy.

## 2017-11-30 NOTE — TELEPHONE ENCOUNTER
----- Message from UNIQUE Duncan sent at 11/29/2017  3:35 PM CST -----  Please let pt know vaginal culture is + for yeast. Rx for Diflucan 150 mg po x 1 sent.      MAF

## 2017-12-01 ENCOUNTER — TELEPHONE (OUTPATIENT)
Dept: FAMILY MEDICINE CLINIC | Facility: CLINIC | Age: 32
End: 2017-12-01

## 2017-12-04 ENCOUNTER — LAB ENCOUNTER (OUTPATIENT)
Dept: LAB | Age: 32
End: 2017-12-04
Attending: OBSTETRICS & GYNECOLOGY
Payer: COMMERCIAL

## 2017-12-04 DIAGNOSIS — Z34.81 ENCOUNTER FOR SUPERVISION OF OTHER NORMAL PREGNANCY IN FIRST TRIMESTER: ICD-10-CM

## 2017-12-04 PROCEDURE — 87389 HIV-1 AG W/HIV-1&-2 AB AG IA: CPT

## 2017-12-04 PROCEDURE — 86780 TREPONEMA PALLIDUM: CPT

## 2017-12-04 PROCEDURE — 86901 BLOOD TYPING SEROLOGIC RH(D): CPT

## 2017-12-04 PROCEDURE — 36415 COLL VENOUS BLD VENIPUNCTURE: CPT

## 2017-12-04 PROCEDURE — 82950 GLUCOSE TEST: CPT

## 2017-12-04 PROCEDURE — 86762 RUBELLA ANTIBODY: CPT

## 2017-12-04 PROCEDURE — 85025 COMPLETE CBC W/AUTO DIFF WBC: CPT

## 2017-12-04 PROCEDURE — 86850 RBC ANTIBODY SCREEN: CPT

## 2017-12-04 PROCEDURE — 87340 HEPATITIS B SURFACE AG IA: CPT

## 2017-12-04 PROCEDURE — 86900 BLOOD TYPING SEROLOGIC ABO: CPT

## 2017-12-04 PROCEDURE — 87086 URINE CULTURE/COLONY COUNT: CPT

## 2017-12-07 ENCOUNTER — INITIAL PRENATAL (OUTPATIENT)
Dept: OBGYN CLINIC | Facility: CLINIC | Age: 32
End: 2017-12-07

## 2017-12-07 VITALS
BODY MASS INDEX: 42 KG/M2 | WEIGHT: 263 LBS | HEART RATE: 88 BPM | DIASTOLIC BLOOD PRESSURE: 79 MMHG | SYSTOLIC BLOOD PRESSURE: 121 MMHG

## 2017-12-07 DIAGNOSIS — Z34.91 ENCOUNTER FOR SUPERVISION OF NORMAL PREGNANCY IN FIRST TRIMESTER, UNSPECIFIED GRAVIDITY: Primary | ICD-10-CM

## 2017-12-07 PROCEDURE — 81002 URINALYSIS NONAUTO W/O SCOPE: CPT | Performed by: OBSTETRICS & GYNECOLOGY

## 2017-12-07 NOTE — PROGRESS NOTES
Gc/Ct/Trich done today. Intermittent Nausea and Vomiting. History of meningioma with no symptoms. FHTs+ today.  Declines FTS  RTC 4 wks

## 2017-12-08 PROBLEM — O99.210 OBESITY IN PREGNANCY (HCC): Status: ACTIVE | Noted: 2017-12-08

## 2017-12-08 PROBLEM — O99.210 OBESITY IN PREGNANCY: Status: ACTIVE | Noted: 2017-12-08

## 2018-01-09 ENCOUNTER — ROUTINE PRENATAL (OUTPATIENT)
Dept: OBGYN CLINIC | Facility: CLINIC | Age: 33
End: 2018-01-09

## 2018-01-09 ENCOUNTER — TELEPHONE (OUTPATIENT)
Dept: OBGYN CLINIC | Facility: CLINIC | Age: 33
End: 2018-01-09

## 2018-01-09 VITALS
DIASTOLIC BLOOD PRESSURE: 80 MMHG | SYSTOLIC BLOOD PRESSURE: 127 MMHG | WEIGHT: 258 LBS | BODY MASS INDEX: 42 KG/M2 | HEART RATE: 97 BPM

## 2018-01-09 DIAGNOSIS — Z34.92 ENCOUNTER FOR SUPERVISION OF NORMAL PREGNANCY IN SECOND TRIMESTER, UNSPECIFIED GRAVIDITY: Primary | ICD-10-CM

## 2018-01-09 LAB
APPEARANCE: CLEAR
MULTISTIX LOT#: NORMAL NUMERIC
PH, URINE: 6 (ref 4.5–8)
PROTEIN (URINE DIPSTICK): 30 MG/DL
SPECIFIC GRAVITY: 1.02 (ref 1–1.03)
URINE-COLOR: YELLOW
UROBILINOGEN,SEMI-QN: 0.2 MG/DL (ref 0–1.9)

## 2018-01-09 PROCEDURE — 81002 URINALYSIS NONAUTO W/O SCOPE: CPT | Performed by: OBSTETRICS & GYNECOLOGY

## 2018-01-09 NOTE — PROGRESS NOTES
Reviewed dating u/s. Family has influenza. Pt has flu symptoms for over 5 days-- was not started on Tamiflu and now too late to start. States sister-in-law  of influenza complication recently. Has not taken anything OTC yet.   Pt to f/u PCP if not g

## 2018-01-10 NOTE — TELEPHONE ENCOUNTER
Lmtcb. Please relay info:    Order for level 2 routed to dmg m. Please advise the patient to verify Dr. Erasmo Cintron [facility] are in network with her insurance. Also, patient may want to verify cpt 37964/10750 are a covered benefit of her plan.   Kalila Mcintosh

## 2018-01-11 ENCOUNTER — HOSPITAL ENCOUNTER (OUTPATIENT)
Age: 33
Discharge: HOME | End: 2018-01-11
Attending: FAMILY MEDICINE
Payer: COMMERCIAL

## 2018-01-11 VITALS
SYSTOLIC BLOOD PRESSURE: 114 MMHG | OXYGEN SATURATION: 100 % | TEMPERATURE: 98 F | HEIGHT: 66 IN | WEIGHT: 252 LBS | RESPIRATION RATE: 24 BRPM | BODY MASS INDEX: 40.5 KG/M2 | DIASTOLIC BLOOD PRESSURE: 71 MMHG | HEART RATE: 93 BPM

## 2018-01-11 DIAGNOSIS — J01.00 ACUTE NON-RECURRENT MAXILLARY SINUSITIS: Primary | ICD-10-CM

## 2018-01-11 DIAGNOSIS — R68.89 FLU-LIKE SYMPTOMS: ICD-10-CM

## 2018-01-11 LAB
FLUAV + FLUBV RNA SPEC NAA+PROBE: NEGATIVE
FLUAV + FLUBV RNA SPEC NAA+PROBE: NEGATIVE
FLUAV + FLUBV RNA SPEC NAA+PROBE: POSITIVE
S PYO AG THROAT QL: NEGATIVE

## 2018-01-11 PROCEDURE — 99213 OFFICE O/P EST LOW 20 MIN: CPT

## 2018-01-11 PROCEDURE — 87631 RESP VIRUS 3-5 TARGETS: CPT | Performed by: FAMILY MEDICINE

## 2018-01-11 PROCEDURE — 87430 STREP A AG IA: CPT

## 2018-01-11 PROCEDURE — 99214 OFFICE O/P EST MOD 30 MIN: CPT

## 2018-01-11 RX ORDER — AMOXICILLIN 875 MG/1
875 TABLET, COATED ORAL 2 TIMES DAILY
Qty: 20 TABLET | Refills: 0 | Status: SHIPPED | OUTPATIENT
Start: 2018-01-11 | End: 2018-01-21

## 2018-01-11 RX ORDER — OSELTAMIVIR PHOSPHATE 75 MG/1
75 CAPSULE ORAL 2 TIMES DAILY
Qty: 10 CAPSULE | Refills: 0 | Status: SHIPPED | OUTPATIENT
Start: 2018-01-11 | End: 2018-01-16

## 2018-01-12 ENCOUNTER — HOSPITAL ENCOUNTER (EMERGENCY)
Facility: HOSPITAL | Age: 33
Discharge: HOME | End: 2018-01-12
Attending: EMERGENCY MEDICINE
Payer: COMMERCIAL

## 2018-01-12 VITALS
RESPIRATION RATE: 18 BRPM | HEART RATE: 85 BPM | OXYGEN SATURATION: 100 % | DIASTOLIC BLOOD PRESSURE: 59 MMHG | TEMPERATURE: 99 F | WEIGHT: 252 LBS | SYSTOLIC BLOOD PRESSURE: 106 MMHG | BODY MASS INDEX: 41 KG/M2

## 2018-01-12 DIAGNOSIS — R00.2 PALPITATIONS: ICD-10-CM

## 2018-01-12 DIAGNOSIS — B97.4 RESPIRATORY SYNCYTIAL VIRUS (RSV): Primary | ICD-10-CM

## 2018-01-12 LAB
ANION GAP SERPL CALC-SCNC: 9 MMOL/L (ref 0–18)
BASOPHILS # BLD: 0 K/UL (ref 0–0.2)
BASOPHILS NFR BLD: 0 %
BUN SERPL-MCNC: 3 MG/DL (ref 8–20)
BUN/CREAT SERPL: 5.8 (ref 10–20)
CALCIUM SERPL-MCNC: 8.9 MG/DL (ref 8.5–10.5)
CHLORIDE SERPL-SCNC: 105 MMOL/L (ref 95–110)
CO2 SERPL-SCNC: 21 MMOL/L (ref 22–32)
CREAT SERPL-MCNC: 0.52 MG/DL (ref 0.5–1.5)
EOSINOPHIL # BLD: 0.2 K/UL (ref 0–0.7)
EOSINOPHIL NFR BLD: 1 %
ERYTHROCYTE [DISTWIDTH] IN BLOOD BY AUTOMATED COUNT: 14.4 % (ref 11–15)
GLUCOSE SERPL-MCNC: 108 MG/DL (ref 70–99)
HCT VFR BLD AUTO: 34.7 % (ref 35–48)
HGB BLD-MCNC: 11.5 G/DL (ref 12–16)
LYMPHOCYTES # BLD: 2.8 K/UL (ref 1–4)
LYMPHOCYTES NFR BLD: 18 %
MCH RBC QN AUTO: 26.3 PG (ref 27–32)
MCHC RBC AUTO-ENTMCNC: 33.2 G/DL (ref 32–37)
MCV RBC AUTO: 79.3 FL (ref 80–100)
MONOCYTES # BLD: 0.7 K/UL (ref 0–1)
MONOCYTES NFR BLD: 5 %
NEUTROPHILS # BLD AUTO: 11.6 K/UL (ref 1.8–7.7)
NEUTROPHILS NFR BLD: 75 %
OSMOLALITY UR CALC.SUM OF ELEC: 277 MOSM/KG (ref 275–295)
PLATELET # BLD AUTO: 346 K/UL (ref 140–400)
PMV BLD AUTO: 7.8 FL (ref 7.4–10.3)
POTASSIUM SERPL-SCNC: 3.5 MMOL/L (ref 3.3–5.1)
RBC # BLD AUTO: 4.37 M/UL (ref 3.7–5.4)
SODIUM SERPL-SCNC: 135 MMOL/L (ref 136–144)
WBC # BLD AUTO: 15.3 K/UL (ref 4–11)

## 2018-01-12 PROCEDURE — 96376 TX/PRO/DX INJ SAME DRUG ADON: CPT

## 2018-01-12 PROCEDURE — 85025 COMPLETE CBC W/AUTO DIFF WBC: CPT | Performed by: EMERGENCY MEDICINE

## 2018-01-12 PROCEDURE — 96374 THER/PROPH/DIAG INJ IV PUSH: CPT

## 2018-01-12 PROCEDURE — 99284 EMERGENCY DEPT VISIT MOD MDM: CPT

## 2018-01-12 PROCEDURE — 96375 TX/PRO/DX INJ NEW DRUG ADDON: CPT

## 2018-01-12 PROCEDURE — 96361 HYDRATE IV INFUSION ADD-ON: CPT

## 2018-01-12 PROCEDURE — 80048 BASIC METABOLIC PNL TOTAL CA: CPT | Performed by: EMERGENCY MEDICINE

## 2018-01-12 PROCEDURE — 93005 ELECTROCARDIOGRAM TRACING: CPT

## 2018-01-12 PROCEDURE — 93010 ELECTROCARDIOGRAM REPORT: CPT | Performed by: EMERGENCY MEDICINE

## 2018-01-12 RX ORDER — DIPHENHYDRAMINE HYDROCHLORIDE 50 MG/ML
25 INJECTION INTRAMUSCULAR; INTRAVENOUS ONCE
Status: COMPLETED | OUTPATIENT
Start: 2018-01-12 | End: 2018-01-12

## 2018-01-12 RX ORDER — METOCLOPRAMIDE HYDROCHLORIDE 5 MG/ML
10 INJECTION INTRAMUSCULAR; INTRAVENOUS ONCE
Status: COMPLETED | OUTPATIENT
Start: 2018-01-12 | End: 2018-01-12

## 2018-01-12 RX ORDER — DIPHENHYDRAMINE HYDROCHLORIDE 50 MG/ML
INJECTION INTRAMUSCULAR; INTRAVENOUS
Status: DISCONTINUED
Start: 2018-01-12 | End: 2018-01-12

## 2018-01-12 RX ORDER — LORATADINE 10 MG/1
10 TABLET ORAL DAILY
Qty: 30 TABLET | Refills: 0 | Status: SHIPPED | OUTPATIENT
Start: 2018-01-12 | End: 2018-01-30

## 2018-01-12 RX ORDER — FLUTICASONE PROPIONATE 50 MCG
2 SPRAY, SUSPENSION (ML) NASAL DAILY
Qty: 16 G | Refills: 0 | Status: SHIPPED | OUTPATIENT
Start: 2018-01-12 | End: 2018-01-30

## 2018-01-12 NOTE — ED PROVIDER NOTES
Patient presents with:  Cough/URI    HPI:     Mile Jarquin is a 34year old female who presents with for chief complaint of fevers, chills, chest congestion, cough, headaches and body aches.   Onset of symptoms was 8 days  Also notes sinus congestion and g Father    • High Cholesterol Father    • Lipids Father      hyperlipidemia   • Other [OTHER] Father      Gallbladder disease, ERCP with stent   • High Blood Pressure Mother    • Hypertension Mother        Smoking status: Former Smoker Ref Range   POCT Rapid Strep Negative Negative       Diagnosis:    ICD-10-CM    1. Acute non-recurrent maxillary sinusitis J01.00    2.  Flu-like symptoms R68.89          Orders Placed This Encounter      POCT Rapid Strep Once      Influenza A/B And Rsv Pcr

## 2018-01-12 NOTE — ED PROVIDER NOTES
Patient Seen in: [facility] AND [facility] Emergency Department    History   Patient presents with:  Cough/URI  Arrythmia/Palpitations (cardiovascular)    Stated Complaint: flu    HPI    34-year-old female at 12 weeks gestation, here with complaints of positive throat. Eyes: Negative for pain, discharge and redness. Respiratory: Negative for cough, shortness of breath and wheezing. Cardiovascular: Positive for palpitations. Negative for chest pain.    Gastrointestinal: Negative for abdominal pain, diarrhea in b/l UEs and LEs, normal sensation in all extremities, normal finger to nose b/l, normal gait, no facial asymmetry, normal speech     Skin: Skin is warm and dry. No rash noted. She is not diaphoretic. Psychiatric: She has a normal mood and affect.    Nu 79.3 (L) 80.0 - 100.0 fL   MCH 26.3 (L) 27.0 - 32.0 pg   MCHC 33.2 32.0 - 37.0 g/dl   RDW 14.4 11.0 - 15.0 %    140 - 400 K/UL   MPV 7.8 7.4 - 10.3 fL   Neutrophil % 75 %   Lymphocyte % 18 %   Monocyte % 5 %   Eosinophil % 1 %   Basophil % 0 %   Yeison patient's history, performing the physical exam and reviewing the diagnostics, multiple initial diagnoses were considered based on the presenting problem including RSV, pneumonia, sinusitis, viral syndrome.           Disposition and Plan     Clinical Impres

## 2018-01-12 NOTE — ED INITIAL ASSESSMENT (HPI)
Patient here with c/o cough x 1 week and chest pain with palpitations x 2 days. States she feels dehydrated. Patient is 16 weeks pregnant.

## 2018-01-12 NOTE — ED NOTES
Pt returned call for test results. Informed that she was negative for influenza A and B, but positive for RSV. Pt notes she feels worse and is in the ED right now because she is having palpitations. Pt notes she just had an ECG completed in triage.  Asked t

## 2018-01-12 NOTE — ED INITIAL ASSESSMENT (HPI)
Pt presents to the IC with c/o severe head pressure, blood-shot eyes, and green productive cough. Pt has multiple sick contacts at home (children and  dx with flu). Pt is 15weeks pregnant.

## 2018-01-12 NOTE — ED NOTES
Pt to ED  from home she was called by The IC because she is + for flu and RSV  Pt is coughing + fever body aches   Skin warm dry normal in color   Lungs clear  Pt on monitor   Pt sat is 98% on RA

## 2018-01-30 ENCOUNTER — ROUTINE PRENATAL (OUTPATIENT)
Dept: OBGYN CLINIC | Facility: CLINIC | Age: 33
End: 2018-01-30

## 2018-01-30 VITALS
WEIGHT: 265 LBS | BODY MASS INDEX: 43 KG/M2 | SYSTOLIC BLOOD PRESSURE: 121 MMHG | DIASTOLIC BLOOD PRESSURE: 77 MMHG | HEART RATE: 97 BPM

## 2018-01-30 DIAGNOSIS — Z34.92 ENCOUNTER FOR SUPERVISION OF NORMAL PREGNANCY IN SECOND TRIMESTER, UNSPECIFIED GRAVIDITY: Primary | ICD-10-CM

## 2018-01-30 LAB
MULTISTIX LOT#: NORMAL NUMERIC
PH, URINE: 5 (ref 4.5–8)
SPECIFIC GRAVITY: 1.03 (ref 1–1.03)

## 2018-01-30 PROCEDURE — 81002 URINALYSIS NONAUTO W/O SCOPE: CPT | Performed by: OBSTETRICS & GYNECOLOGY

## 2018-01-30 NOTE — PROGRESS NOTES
Has not made appt yet for [facility] u/s. Has number.  Emotional support given re: 27 y/o sister-in-law sudden unexplained death -- autopsy only took one week & told natural causes --RTC 4 wks

## 2018-02-19 ENCOUNTER — HOSPITAL ENCOUNTER (OUTPATIENT)
Dept: PERINATAL CARE | Facility: HOSPITAL | Age: 33
Discharge: HOME | End: 2018-02-19
Attending: OBSTETRICS & GYNECOLOGY
Payer: COMMERCIAL

## 2018-02-19 VITALS
BODY MASS INDEX: 42.59 KG/M2 | DIASTOLIC BLOOD PRESSURE: 70 MMHG | HEART RATE: 88 BPM | SYSTOLIC BLOOD PRESSURE: 130 MMHG | WEIGHT: 265 LBS | HEIGHT: 66 IN

## 2018-02-19 DIAGNOSIS — O99.210 OBESITY IN PREGNANCY: ICD-10-CM

## 2018-02-19 DIAGNOSIS — O99.210 OBESITY IN PREGNANCY: Primary | ICD-10-CM

## 2018-02-19 DIAGNOSIS — Z36.3 ENCOUNTER FOR ANTENATAL SCREENING FOR MALFORMATION USING ULTRASOUND: ICD-10-CM

## 2018-02-19 PROCEDURE — 99243 OFF/OP CNSLTJ NEW/EST LOW 30: CPT | Performed by: OBSTETRICS & GYNECOLOGY

## 2018-02-19 PROCEDURE — 76811 OB US DETAILED SNGL FETUS: CPT | Performed by: OBSTETRICS & GYNECOLOGY

## 2018-02-19 NOTE — PROGRESS NOTES
Pt here for Level II ultrasound  Obesity  Denies pregnancy complaints and states feeling fetal movement

## 2018-02-19 NOTE — PROGRESS NOTES
Marylen Neigh is a 34year old female. Reason for Consult:   Obesity- BMI 42. Doing well. No complaints.     Review of History:     OB History:    Obstetric History     T2    L2    SAB1  TAB0  Ectopic0  Multiple0  Live Births2     Comment: UPPER GI ENDOSCOPY; W/ENDOSCOPIC U/S ESOPHAGEA*   Family History   Problem Relation Age of Onset   • Diabetes Father    • High Cholesterol Father    • Lipids Father      hyperlipidemia   • Other [OTHER] Father      Gallbladder disease, ERCP with stent   • most common medical complications of the obese . The increased risk of type 2 diabetes is primarily related to an exaggerated increase in insulin resistance in the obese state.  It is reasonable to screen obese gravidas for undiagnosed pregestational overweight and obese pregnant women experienced significantly more stillbirths than normal weight women. Increase  testing and Level 2 Ultrasound is recommended.          OB ULTRASOUND REPORT   See imaging tab for complete ultrasound report or questions or concerns. Total patient time was 40 minutes in evaluation, consultation, and coordination of care. Greater than 50% of this time was spent in face to face discussion with the patient.

## 2018-02-27 ENCOUNTER — TELEPHONE (OUTPATIENT)
Dept: OBGYN CLINIC | Facility: CLINIC | Age: 33
End: 2018-02-27

## 2018-02-27 ENCOUNTER — ROUTINE PRENATAL (OUTPATIENT)
Dept: OBGYN CLINIC | Facility: CLINIC | Age: 33
End: 2018-02-27

## 2018-02-27 VITALS
DIASTOLIC BLOOD PRESSURE: 75 MMHG | BODY MASS INDEX: 43 KG/M2 | SYSTOLIC BLOOD PRESSURE: 118 MMHG | WEIGHT: 265 LBS | HEART RATE: 97 BPM

## 2018-02-27 DIAGNOSIS — Z34.92 ENCOUNTER FOR SUPERVISION OF NORMAL PREGNANCY IN SECOND TRIMESTER, UNSPECIFIED GRAVIDITY: Primary | ICD-10-CM

## 2018-02-27 DIAGNOSIS — O99.210 OBESITY IN PREGNANCY: Primary | ICD-10-CM

## 2018-02-27 LAB
MULTISTIX LOT#: NORMAL NUMERIC
PH, URINE: 5 (ref 4.5–8)
PROTEIN (URINE DIPSTICK): 1 MG/DL
SPECIFIC GRAVITY: 1.03 (ref 1–1.03)

## 2018-02-27 PROCEDURE — 81002 URINALYSIS NONAUTO W/O SCOPE: CPT | Performed by: OBSTETRICS & GYNECOLOGY

## 2018-03-27 ENCOUNTER — ROUTINE PRENATAL (OUTPATIENT)
Dept: OBGYN CLINIC | Facility: CLINIC | Age: 33
End: 2018-03-27

## 2018-03-27 VITALS
SYSTOLIC BLOOD PRESSURE: 111 MMHG | HEART RATE: 91 BPM | BODY MASS INDEX: 43 KG/M2 | WEIGHT: 267.38 LBS | DIASTOLIC BLOOD PRESSURE: 74 MMHG

## 2018-03-27 DIAGNOSIS — Z34.92 ENCOUNTER FOR SUPERVISION OF NORMAL PREGNANCY IN SECOND TRIMESTER, UNSPECIFIED GRAVIDITY: Primary | ICD-10-CM

## 2018-03-27 LAB
APPEARANCE: CLEAR
MULTISTIX LOT#: NORMAL NUMERIC
PH, URINE: 7 (ref 4.5–8)
PROTEIN (URINE DIPSTICK): 30 MG/DL
SPECIFIC GRAVITY: 1.01 (ref 1–1.03)
URINE-COLOR: YELLOW
UROBILINOGEN,SEMI-QN: 0.2 MG/DL (ref 0–1.9)

## 2018-03-27 PROCEDURE — 81002 URINALYSIS NONAUTO W/O SCOPE: CPT | Performed by: OBSTETRICS & GYNECOLOGY

## 2018-04-04 ENCOUNTER — HOSPITAL ENCOUNTER (OUTPATIENT)
Dept: PERINATAL CARE | Facility: HOSPITAL | Age: 33
Discharge: HOME | End: 2018-04-04
Attending: OBSTETRICS & GYNECOLOGY
Payer: COMMERCIAL

## 2018-04-04 VITALS — SYSTOLIC BLOOD PRESSURE: 124 MMHG | HEART RATE: 102 BPM | DIASTOLIC BLOOD PRESSURE: 74 MMHG

## 2018-04-04 DIAGNOSIS — O99.210 OBESITY IN PREGNANCY: Primary | ICD-10-CM

## 2018-04-04 DIAGNOSIS — E66.01 MORBID OBESITY WITH BMI OF 40.0-44.9, ADULT (HCC): ICD-10-CM

## 2018-04-04 DIAGNOSIS — O99.210 OBESITY IN PREGNANCY: ICD-10-CM

## 2018-04-04 PROCEDURE — 99213 OFFICE O/P EST LOW 20 MIN: CPT | Performed by: OBSTETRICS & GYNECOLOGY

## 2018-04-04 PROCEDURE — 76805 OB US >/= 14 WKS SNGL FETUS: CPT | Performed by: OBSTETRICS & GYNECOLOGY

## 2018-04-04 NOTE — PROGRESS NOTES
Gaye Garrison is a 34year old female. Reason for Consult:   Obesity- BMI 42. Doing well. No complaints. She has not done her GTT yet.   Review of History:     OB History:    Obstetric History     T2    L2    SAB1  TAB0  Ectopic0  Multipl LASIK  2004: TONSILLECTOMY  2013: UPPER GI ENDOSCOPY; W/ENDOSCOPIC U/S ESOPHAGEA*   Family History   Problem Relation Age of Onset   • Diabetes Father    • High Cholesterol Father    • Lipids Father      hyperlipidemia   • Other Tasia Porch Father      Rubio Tamez spent in face to face discussion with the patient.

## 2018-04-10 ENCOUNTER — APPOINTMENT (OUTPATIENT)
Dept: LAB | Age: 33
End: 2018-04-10
Attending: OBSTETRICS & GYNECOLOGY
Payer: COMMERCIAL

## 2018-04-10 DIAGNOSIS — Z34.92 ENCOUNTER FOR SUPERVISION OF NORMAL PREGNANCY IN SECOND TRIMESTER, UNSPECIFIED GRAVIDITY: ICD-10-CM

## 2018-04-10 PROCEDURE — 36415 COLL VENOUS BLD VENIPUNCTURE: CPT

## 2018-04-10 PROCEDURE — 85027 COMPLETE CBC AUTOMATED: CPT

## 2018-04-10 PROCEDURE — 82950 GLUCOSE TEST: CPT

## 2018-04-12 ENCOUNTER — ROUTINE PRENATAL (OUTPATIENT)
Dept: OBGYN CLINIC | Facility: CLINIC | Age: 33
End: 2018-04-12

## 2018-04-12 VITALS
SYSTOLIC BLOOD PRESSURE: 114 MMHG | WEIGHT: 267 LBS | HEART RATE: 102 BPM | DIASTOLIC BLOOD PRESSURE: 77 MMHG | BODY MASS INDEX: 43 KG/M2

## 2018-04-12 DIAGNOSIS — Z34.93 ENCOUNTER FOR SUPERVISION OF NORMAL PREGNANCY IN THIRD TRIMESTER, UNSPECIFIED GRAVIDITY: Primary | ICD-10-CM

## 2018-04-12 PROCEDURE — 81002 URINALYSIS NONAUTO W/O SCOPE: CPT | Performed by: OBSTETRICS & GYNECOLOGY

## 2018-04-12 RX ORDER — FAMOTIDINE 40 MG/1
40 TABLET, FILM COATED ORAL
COMMUNITY
Start: 2010-07-26 | End: 2018-08-15

## 2018-04-12 NOTE — PROGRESS NOTES
Sciatic pain- reviewed stretches and exercises. TDAP next visit.  Discussed kick counts and growth US  RTC 2 wks

## 2018-04-30 ENCOUNTER — HOSPITAL ENCOUNTER (OUTPATIENT)
Age: 33
Discharge: HOME | End: 2018-04-30
Attending: FAMILY MEDICINE
Payer: COMMERCIAL

## 2018-04-30 ENCOUNTER — ROUTINE PRENATAL (OUTPATIENT)
Dept: OBGYN CLINIC | Facility: CLINIC | Age: 33
End: 2018-04-30

## 2018-04-30 ENCOUNTER — TELEPHONE (OUTPATIENT)
Dept: ADMINISTRATIVE | Age: 33
End: 2018-04-30

## 2018-04-30 ENCOUNTER — APPOINTMENT (OUTPATIENT)
Dept: GENERAL RADIOLOGY | Age: 33
End: 2018-04-30
Attending: FAMILY MEDICINE
Payer: COMMERCIAL

## 2018-04-30 VITALS
HEART RATE: 99 BPM | BODY MASS INDEX: 41.78 KG/M2 | WEIGHT: 260 LBS | OXYGEN SATURATION: 100 % | TEMPERATURE: 98 F | HEIGHT: 66 IN | RESPIRATION RATE: 18 BRPM | SYSTOLIC BLOOD PRESSURE: 133 MMHG | DIASTOLIC BLOOD PRESSURE: 73 MMHG

## 2018-04-30 VITALS
HEART RATE: 87 BPM | SYSTOLIC BLOOD PRESSURE: 113 MMHG | WEIGHT: 270.81 LBS | DIASTOLIC BLOOD PRESSURE: 73 MMHG | BODY MASS INDEX: 44 KG/M2

## 2018-04-30 DIAGNOSIS — S60.222A CONTUSION OF LEFT HAND, INITIAL ENCOUNTER: Primary | ICD-10-CM

## 2018-04-30 DIAGNOSIS — Z34.83 ENCOUNTER FOR SUPERVISION OF OTHER NORMAL PREGNANCY IN THIRD TRIMESTER: Primary | ICD-10-CM

## 2018-04-30 PROBLEM — Z87.59 HISTORY OF PRECIPITOUS LABOR AND DELIVERY: Status: ACTIVE | Noted: 2018-04-30

## 2018-04-30 PROCEDURE — 81002 URINALYSIS NONAUTO W/O SCOPE: CPT | Performed by: OBSTETRICS & GYNECOLOGY

## 2018-04-30 PROCEDURE — 73130 X-RAY EXAM OF HAND: CPT | Performed by: FAMILY MEDICINE

## 2018-04-30 PROCEDURE — 99213 OFFICE O/P EST LOW 20 MIN: CPT

## 2018-04-30 NOTE — ED INITIAL ASSESSMENT (HPI)
Frankey Adams yesterday tripping on a rug while carrying baby and landed on left hand. Patient has been icing and elevating and hand is not feeling better. No OTC meds PTA. Denies hitting head, denies LOC. Limited ROM and patient unable to lift with left hand.  Pain

## 2018-04-30 NOTE — TELEPHONE ENCOUNTER
[employer] form received in [employer]. Logged for processing. [employer] packet emailed to pt ([email or web address]).  MACI

## 2018-04-30 NOTE — ED PROVIDER NOTES
Patient Seen in: [address]    History   Patient presents with:  Fall (musculoskeletal, neurologic)  Upper Extremity Injury (musculoskeletal)    Stated Complaint: left hand swollen    HPI    L hand injury   Tried to amari °C) (Oral)   Resp 18   Ht 167.6 cm (5' 6\")   Wt 117.9 kg   LMP 09/18/2017 (Exact Date)   SpO2 100%   BMI 41.97 kg/m²         Physical Exam   Constitutional: She is oriented to person, place, and time. She appears well-developed and well-nourished.    [facility] Medication List

## 2018-05-03 ENCOUNTER — HOSPITAL ENCOUNTER (OUTPATIENT)
Dept: PERINATAL CARE | Facility: HOSPITAL | Age: 33
Discharge: HOME | End: 2018-05-03
Attending: OBSTETRICS & GYNECOLOGY
Payer: COMMERCIAL

## 2018-05-03 ENCOUNTER — TELEPHONE (OUTPATIENT)
Dept: OBGYN CLINIC | Facility: CLINIC | Age: 33
End: 2018-05-03

## 2018-05-03 VITALS
HEART RATE: 90 BPM | HEIGHT: 66 IN | BODY MASS INDEX: 43.39 KG/M2 | SYSTOLIC BLOOD PRESSURE: 112 MMHG | DIASTOLIC BLOOD PRESSURE: 68 MMHG | WEIGHT: 270 LBS

## 2018-05-03 DIAGNOSIS — O99.210 OBESITY IN PREGNANCY: ICD-10-CM

## 2018-05-03 DIAGNOSIS — O36.8130 DECREASED FETAL MOVEMENTS IN THIRD TRIMESTER, SINGLE OR UNSPECIFIED FETUS: ICD-10-CM

## 2018-05-03 DIAGNOSIS — O99.210 OBESITY IN PREGNANCY: Primary | ICD-10-CM

## 2018-05-03 PROCEDURE — 76805 OB US >/= 14 WKS SNGL FETUS: CPT | Performed by: OBSTETRICS & GYNECOLOGY

## 2018-05-03 PROCEDURE — 99213 OFFICE O/P EST LOW 20 MIN: CPT | Performed by: OBSTETRICS & GYNECOLOGY

## 2018-05-03 RX ORDER — BREAST PUMP
EACH MISCELLANEOUS
Qty: 1 EACH | Refills: 0 | Status: SHIPPED | OUTPATIENT
Start: 2018-05-03 | End: 2018-08-15

## 2018-05-03 NOTE — TELEPHONE ENCOUNTER
Pt stopped @[facility] - Signed release + FCR+ paid $25. Aware of 2nd charge if disab. form is received.  NK

## 2018-05-03 NOTE — PROGRESS NOTES
Pt here for follow up growth  Obesity 43.58  Meningoma 2015  PCOS  GERD  Anxiety Depression  Latent TB  Denies pregnancy complaints and states feeling fetal movement

## 2018-05-03 NOTE — PROGRESS NOTES
Maya Kim is a 34year old female. Reason for Consult:   Obesity- BMI 42. Doing well. No complaints. One hour GTT was 131.   Review of History:     OB History:    Obstetric History     T2    L2    SAB1  TAB0  Ectopic0  Multiple0  Live Bilateral  03/15/16: LAPAROSCOPIC CHOLECYSTECTOMY  2008: LASIK  2004: TONSILLECTOMY  2013: UPPER GI ENDOSCOPY; W/ENDOSCOPIC U/S ESOPHAGEA*   Family History   Problem Relation Age of Onset   • Diabetes Father    • High Cholesterol Father    • Lipids Father was 15 minutes in evaluation, consultation, and coordination of care. Greater than 50% of this time was spent in face to face discussion with the patient.

## 2018-05-03 NOTE — TELEPHONE ENCOUNTER
Dr. Leon Gallegos    Please sign off on form:  -Highlight the patient and hit \"Chart\" button. -In Chart Review, w/in the Encounter tab - click 1 time on the Telephone call encounter for 4-30-18. Scroll down the telephone encounter.  -Click \"scan on\" blue Hyperlink under \"Media\" heading for PPD FMLA Dr. Leon Gallegos 4-30-18 w/in the telephone enc.  -Click on Acknowledge button at the bottom right corner and left-click onto image, signature stamp appears and drag signature to Provider signature line. Stamp will turn blue. Close window.     Thank you,  Juancarlos Graves

## 2018-05-04 NOTE — TELEPHONE ENCOUNTER
FMLA form faxed to [facility]. Original mailed to patient. Form fee pd. Request complete.

## 2018-05-14 ENCOUNTER — TELEPHONE (OUTPATIENT)
Dept: OBGYN CLINIC | Facility: CLINIC | Age: 33
End: 2018-05-14

## 2018-05-14 ENCOUNTER — ROUTINE PRENATAL (OUTPATIENT)
Dept: OBGYN CLINIC | Facility: CLINIC | Age: 33
End: 2018-05-14

## 2018-05-14 VITALS
BODY MASS INDEX: 43 KG/M2 | DIASTOLIC BLOOD PRESSURE: 73 MMHG | SYSTOLIC BLOOD PRESSURE: 111 MMHG | WEIGHT: 267 LBS | HEART RATE: 108 BPM

## 2018-05-14 DIAGNOSIS — Z34.93 ENCOUNTER FOR SUPERVISION OF NORMAL PREGNANCY IN THIRD TRIMESTER, UNSPECIFIED GRAVIDITY: Primary | ICD-10-CM

## 2018-05-14 PROBLEM — Z34.90 NORMAL OBSTETRIC ULTRASOUND SCAN, ANTEPARTUM: Status: ACTIVE | Noted: 2018-05-14

## 2018-05-14 PROBLEM — Z34.90 NORMAL OBSTETRIC ULTRASOUND SCAN, ANTEPARTUM (HCC): Status: ACTIVE | Noted: 2018-05-14

## 2018-05-14 PROCEDURE — 81002 URINALYSIS NONAUTO W/O SCOPE: CPT | Performed by: OBSTETRICS & GYNECOLOGY

## 2018-05-15 NOTE — TELEPHONE ENCOUNTER
lmtcb.  PLEASE RELAY INFO:    Order for weekly nst's entered. Patient can call maternal fetal medicine at [phone] to schedule an appointment.

## 2018-05-24 ENCOUNTER — HOSPITAL ENCOUNTER (OUTPATIENT)
Dept: PERINATAL CARE | Facility: HOSPITAL | Age: 33
Discharge: HOME | End: 2018-05-24
Attending: OBSTETRICS & GYNECOLOGY
Payer: COMMERCIAL

## 2018-05-24 PROCEDURE — 59025 FETAL NON-STRESS TEST: CPT | Performed by: OBSTETRICS & GYNECOLOGY

## 2018-05-24 NOTE — NST
Nonstress Test   Patient: Maya Kim    Gestation: 34w5d    NST: obesity       Variability: Moderate           Accelerations: Yes           Decelerations: None            Baseline: 130 BPM           Uterine Irritability: No           Contractions: Not p

## 2018-05-31 ENCOUNTER — ROUTINE PRENATAL (OUTPATIENT)
Dept: OBGYN CLINIC | Facility: CLINIC | Age: 33
End: 2018-05-31

## 2018-05-31 ENCOUNTER — HOSPITAL ENCOUNTER (OUTPATIENT)
Dept: PERINATAL CARE | Facility: HOSPITAL | Age: 33
Discharge: HOME | End: 2018-05-31
Attending: OBSTETRICS & GYNECOLOGY | Admitting: OBSTETRICS & GYNECOLOGY
Payer: COMMERCIAL

## 2018-05-31 VITALS — SYSTOLIC BLOOD PRESSURE: 127 MMHG | DIASTOLIC BLOOD PRESSURE: 67 MMHG | HEART RATE: 98 BPM

## 2018-05-31 VITALS
WEIGHT: 264 LBS | DIASTOLIC BLOOD PRESSURE: 74 MMHG | HEART RATE: 90 BPM | BODY MASS INDEX: 43 KG/M2 | SYSTOLIC BLOOD PRESSURE: 130 MMHG

## 2018-05-31 DIAGNOSIS — Z34.93 ENCOUNTER FOR SUPERVISION OF NORMAL PREGNANCY IN THIRD TRIMESTER, UNSPECIFIED GRAVIDITY: Primary | ICD-10-CM

## 2018-05-31 PROCEDURE — 90715 TDAP VACCINE 7 YRS/> IM: CPT | Performed by: OBSTETRICS & GYNECOLOGY

## 2018-05-31 PROCEDURE — 59025 FETAL NON-STRESS TEST: CPT | Performed by: OBSTETRICS & GYNECOLOGY

## 2018-05-31 PROCEDURE — 90471 IMMUNIZATION ADMIN: CPT | Performed by: OBSTETRICS & GYNECOLOGY

## 2018-05-31 PROCEDURE — 81002 URINALYSIS NONAUTO W/O SCOPE: CPT | Performed by: OBSTETRICS & GYNECOLOGY

## 2018-05-31 NOTE — NST
Nonstress Test   Patient: Nereyda Brush    Gestation: 35w5d    NST:       Variability: Moderate           Accelerations: Yes           Decelerations: None            Baseline: 135 BPM           Uterine Irritability: No           Contractions: Irregular

## 2018-06-01 NOTE — ADDENDUM NOTE
Encounter addended by: Ebony Spring MD on: 6/1/2018  9:35 AM<BR>    Actions taken: Sign clinical note, Charge Capture section accepted

## 2018-06-06 ENCOUNTER — LAB ENCOUNTER (OUTPATIENT)
Dept: LAB | Age: 33
End: 2018-06-06
Attending: OBSTETRICS & GYNECOLOGY
Payer: COMMERCIAL

## 2018-06-06 ENCOUNTER — HOSPITAL ENCOUNTER (OUTPATIENT)
Dept: PERINATAL CARE | Facility: HOSPITAL | Age: 33
Discharge: HOME | End: 2018-06-06
Attending: OBSTETRICS & GYNECOLOGY
Payer: COMMERCIAL

## 2018-06-06 VITALS
WEIGHT: 264 LBS | BODY MASS INDEX: 43 KG/M2 | DIASTOLIC BLOOD PRESSURE: 54 MMHG | DIASTOLIC BLOOD PRESSURE: 54 MMHG | SYSTOLIC BLOOD PRESSURE: 100 MMHG | SYSTOLIC BLOOD PRESSURE: 100 MMHG

## 2018-06-06 DIAGNOSIS — O99.210 OBESITY IN PREGNANCY: ICD-10-CM

## 2018-06-06 DIAGNOSIS — O36.8130 DECREASED FETAL MOVEMENTS IN THIRD TRIMESTER, SINGLE OR UNSPECIFIED FETUS: ICD-10-CM

## 2018-06-06 DIAGNOSIS — Z34.93 ENCOUNTER FOR SUPERVISION OF NORMAL PREGNANCY IN THIRD TRIMESTER, UNSPECIFIED GRAVIDITY: ICD-10-CM

## 2018-06-06 DIAGNOSIS — O99.210 OBESITY IN PREGNANCY: Primary | ICD-10-CM

## 2018-06-06 PROCEDURE — 76805 OB US >/= 14 WKS SNGL FETUS: CPT | Performed by: OBSTETRICS & GYNECOLOGY

## 2018-06-06 PROCEDURE — 99213 OFFICE O/P EST LOW 20 MIN: CPT | Performed by: OBSTETRICS & GYNECOLOGY

## 2018-06-06 PROCEDURE — 36415 COLL VENOUS BLD VENIPUNCTURE: CPT

## 2018-06-06 PROCEDURE — 85027 COMPLETE CBC AUTOMATED: CPT

## 2018-06-06 PROCEDURE — 59025 FETAL NON-STRESS TEST: CPT | Performed by: OBSTETRICS & GYNECOLOGY

## 2018-06-06 PROCEDURE — 86780 TREPONEMA PALLIDUM: CPT

## 2018-06-06 PROCEDURE — 87389 HIV-1 AG W/HIV-1&-2 AB AG IA: CPT

## 2018-06-06 NOTE — NST
Nonstress Test   Patient: Maya Kim    Gestation: 36w4d    NST:obesity       Variability: Moderate           Accelerations: Yes           Decelerations: None            Baseline: 125 BPM           Uterine Irritability: No           Contractions: Not pr

## 2018-06-06 NOTE — ADDENDUM NOTE
Encounter addended by: Jitendra Domínguez MD on: 6/6/2018 11:56 AM<BR>    Actions taken: Sign clinical note, Charge Capture section accepted

## 2018-06-06 NOTE — ADDENDUM NOTE
Encounter addended by: Dejan Beal DO on: 6/6/2018  4:36 PM<BR>    Actions taken: Sign clinical note, Charge Capture section accepted

## 2018-06-06 NOTE — PROGRESS NOTES
Gaye Garrison is a 34year old female. Reason for Consult:   Obesity- BMI 42. Doing well. No complaints. One hour GTT was 131.   Review of History:     OB History:    Obstetric History     T2    L2    SAB1  TAB0  Ectopic0  Multiple0  Live 2015      Past Surgical History:  2011: D & C  No date: EYE MUSCLE SURG [address] UNLISTED Bilateral  03/15/16: LAPAROSCOPIC CHOLECYSTECTOMY  2008: LASIK  2004: TONSILLECTOMY  2013: UPPER GI ENDOSCOPY; W/ENDOSCOPIC U/S ESOPHAGEA*   Family History   Problem Rela than 50% of this time was spent in face to face discussion with the patient.

## 2018-06-13 ENCOUNTER — HOSPITAL ENCOUNTER (OUTPATIENT)
Dept: PERINATAL CARE | Facility: HOSPITAL | Age: 33
Discharge: HOME | End: 2018-06-13
Attending: OBSTETRICS & GYNECOLOGY
Payer: COMMERCIAL

## 2018-06-13 ENCOUNTER — ROUTINE PRENATAL (OUTPATIENT)
Dept: OBGYN CLINIC | Facility: CLINIC | Age: 33
End: 2018-06-13

## 2018-06-13 VITALS
BODY MASS INDEX: 44 KG/M2 | HEART RATE: 89 BPM | SYSTOLIC BLOOD PRESSURE: 116 MMHG | WEIGHT: 270.63 LBS | DIASTOLIC BLOOD PRESSURE: 75 MMHG

## 2018-06-13 VITALS — SYSTOLIC BLOOD PRESSURE: 103 MMHG | DIASTOLIC BLOOD PRESSURE: 54 MMHG

## 2018-06-13 VITALS — DIASTOLIC BLOOD PRESSURE: 54 MMHG | SYSTOLIC BLOOD PRESSURE: 103 MMHG

## 2018-06-13 DIAGNOSIS — Z87.59 HISTORY OF PRECIPITOUS LABOR AND DELIVERY: ICD-10-CM

## 2018-06-13 DIAGNOSIS — O28.8 NON-STRESS TEST WITH DECELERATIONS: Primary | ICD-10-CM

## 2018-06-13 DIAGNOSIS — O99.210 OBESITY IN PREGNANCY: ICD-10-CM

## 2018-06-13 DIAGNOSIS — O28.8 NON-STRESS TEST WITH DECELERATIONS: ICD-10-CM

## 2018-06-13 DIAGNOSIS — Z34.93 ENCOUNTER FOR SUPERVISION OF NORMAL PREGNANCY IN THIRD TRIMESTER, UNSPECIFIED GRAVIDITY: Primary | ICD-10-CM

## 2018-06-13 PROCEDURE — 99212 OFFICE O/P EST SF 10 MIN: CPT | Performed by: OBSTETRICS & GYNECOLOGY

## 2018-06-13 PROCEDURE — 76819 FETAL BIOPHYS PROFIL W/O NST: CPT | Performed by: OBSTETRICS & GYNECOLOGY

## 2018-06-13 PROCEDURE — 81002 URINALYSIS NONAUTO W/O SCOPE: CPT | Performed by: OBSTETRICS & GYNECOLOGY

## 2018-06-13 PROCEDURE — 59025 FETAL NON-STRESS TEST: CPT | Performed by: OBSTETRICS & GYNECOLOGY

## 2018-06-13 NOTE — NST
Nonstress Test   Patient: Lana Olivares    Gestation: 37w4d    NST:obesity       Variability: Moderate           Accelerations: Yes           Decelerations: Variable            Baseline: 120 BPM           Uterine Irritability: No           Contractions: Ir

## 2018-06-13 NOTE — ADDENDUM NOTE
Encounter addended by: Niayh Morgan RN on: 6/13/2018 10:20 AM<BR>    Actions taken: Sign clinical note

## 2018-06-13 NOTE — PROGRESS NOTES
Nereyda Brush is a 34year old female. Reason for Consult:   Obesity- BMI 42. Doing well. No complaints. One hour GTT was 131.   Review of History:     OB History:    Obstetric History     T2    L2    SAB1  TAB0  Ectopic0  Multiple0  Live with the patient.

## 2018-06-18 ENCOUNTER — HOSPITAL ENCOUNTER (OUTPATIENT)
Dept: PERINATAL CARE | Facility: HOSPITAL | Age: 33
Discharge: HOME | End: 2018-06-18
Attending: OBSTETRICS & GYNECOLOGY | Admitting: OBSTETRICS & GYNECOLOGY
Payer: COMMERCIAL

## 2018-06-18 ENCOUNTER — TELEPHONE (OUTPATIENT)
Dept: OBGYN CLINIC | Facility: CLINIC | Age: 33
End: 2018-06-18

## 2018-06-18 ENCOUNTER — ROUTINE PRENATAL (OUTPATIENT)
Dept: OBGYN CLINIC | Facility: CLINIC | Age: 33
End: 2018-06-18

## 2018-06-18 VITALS
HEART RATE: 82 BPM | BODY MASS INDEX: 44 KG/M2 | DIASTOLIC BLOOD PRESSURE: 77 MMHG | WEIGHT: 270 LBS | SYSTOLIC BLOOD PRESSURE: 118 MMHG

## 2018-06-18 DIAGNOSIS — Z34.93 ENCOUNTER FOR SUPERVISION OF NORMAL PREGNANCY IN THIRD TRIMESTER, UNSPECIFIED GRAVIDITY: Primary | ICD-10-CM

## 2018-06-18 PROCEDURE — 81002 URINALYSIS NONAUTO W/O SCOPE: CPT | Performed by: OBSTETRICS & GYNECOLOGY

## 2018-06-18 PROCEDURE — 59025 FETAL NON-STRESS TEST: CPT | Performed by: OBSTETRICS & GYNECOLOGY

## 2018-06-18 RX ORDER — MELATONIN
325
COMMUNITY
End: 2018-08-15

## 2018-06-18 NOTE — NST
Nonstress Test   Patient: Celia Florian    Gestation: 38w2d    NST:       Variability: Moderate           Accelerations: Yes           Decelerations: None            Baseline: 135 BPM           Uterine Irritability: No           Contractions: Not present

## 2018-06-18 NOTE — TELEPHONE ENCOUNTER
Please send letter to Juan Jose Sidhu that patient will no longer be working due to pregnancy.  He is the  of [address] [place] [address] at the [employer].

## 2018-06-18 NOTE — TELEPHONE ENCOUNTER
Spoke to pt who states it is fine to send through [employer] and she will email to her supervisor, Holley Cabrera. Pt appreciative.

## 2018-06-19 ENCOUNTER — HOSPITAL ENCOUNTER (OUTPATIENT)
Facility: HOSPITAL | Age: 33
Discharge: HOME | End: 2018-06-19
Attending: OBSTETRICS & GYNECOLOGY | Admitting: OBSTETRICS & GYNECOLOGY
Payer: COMMERCIAL

## 2018-06-19 ENCOUNTER — TELEPHONE (OUTPATIENT)
Dept: OBGYN CLINIC | Facility: CLINIC | Age: 33
End: 2018-06-19

## 2018-06-19 VITALS — SYSTOLIC BLOOD PRESSURE: 112 MMHG | DIASTOLIC BLOOD PRESSURE: 62 MMHG | HEART RATE: 97 BPM

## 2018-06-19 PROCEDURE — 59025 FETAL NON-STRESS TEST: CPT | Performed by: OBSTETRICS & GYNECOLOGY

## 2018-06-19 NOTE — TRIAGE
[facility] HOSP - [facility]      Triage Note    Margy Hyde Patient Status:  Outpatient in a Bed    1985 MRN [id number]   Location [address]  Attending Joaquin Nuñez MD   Hosp Day # 0 PCP MD Mi Ding Discharged to home. Liza Kumar, RN  6/19/2018 6:03 PM        ADDENDUM:  Diagnosis: Decreased fetal movement  I have reviewed the above documentation and agree with the above findings.    Recommend continued management and plan     Trenda Dandy, 0

## 2018-06-19 NOTE — TELEPHONE ENCOUNTER
C/O SIGNIFICANTLY DECREASED FETAL MOVEMENT TODAY. STATES SHE HAD LUNCH AROUND NOON (EGGS, POTATO AND JUICE) AND HAD AN ICED COFFEE ABOUT 1 HOUR AGO. SAID SHE ONLY FEELS MOVEMENT WHEN SHE PRESSES AND/OR PUSHES ON HER BELLY. SHE DID HAVE MOVEMENT LAST NIGHT. MESSAGE REVIEWED WITH KCB AND RECS TO SEND PT TO [address] [place] [address]. LYNN AT [address] [place] [address] NOTIFIED.

## 2018-06-25 ENCOUNTER — ROUTINE PRENATAL (OUTPATIENT)
Dept: OBGYN CLINIC | Facility: CLINIC | Age: 33
End: 2018-06-25

## 2018-06-25 ENCOUNTER — HOSPITAL ENCOUNTER (OUTPATIENT)
Dept: PERINATAL CARE | Facility: HOSPITAL | Age: 33
Discharge: HOME | End: 2018-06-25
Attending: OBSTETRICS & GYNECOLOGY
Payer: COMMERCIAL

## 2018-06-25 VITALS — DIASTOLIC BLOOD PRESSURE: 68 MMHG | SYSTOLIC BLOOD PRESSURE: 130 MMHG

## 2018-06-25 VITALS
HEART RATE: 90 BPM | DIASTOLIC BLOOD PRESSURE: 69 MMHG | SYSTOLIC BLOOD PRESSURE: 105 MMHG | WEIGHT: 265 LBS | BODY MASS INDEX: 43 KG/M2

## 2018-06-25 DIAGNOSIS — Z34.93 ENCOUNTER FOR SUPERVISION OF NORMAL PREGNANCY IN THIRD TRIMESTER, UNSPECIFIED GRAVIDITY: Primary | ICD-10-CM

## 2018-06-25 PROCEDURE — 81002 URINALYSIS NONAUTO W/O SCOPE: CPT | Performed by: OBSTETRICS & GYNECOLOGY

## 2018-06-25 PROCEDURE — 59025 FETAL NON-STRESS TEST: CPT | Performed by: OBSTETRICS & GYNECOLOGY

## 2018-06-25 NOTE — ADDENDUM NOTE
Encounter addended by: Bill Humphrey RN on: 6/25/2018  9:23 AM<BR>    Actions taken: Sign clinical note

## 2018-06-25 NOTE — NST
Nonstress Test   Patient: Estefany Bourne    Gestation: 39w2d    NST: obesity       Variability: Moderate           Accelerations: Yes           Decelerations: None            Baseline: 130 BPM           Uterine Irritability: No           Contractions: Not p

## 2018-07-02 ENCOUNTER — ROUTINE PRENATAL (OUTPATIENT)
Dept: OBGYN CLINIC | Facility: CLINIC | Age: 33
End: 2018-07-02

## 2018-07-02 ENCOUNTER — TELEPHONE (OUTPATIENT)
Dept: OBGYN CLINIC | Facility: CLINIC | Age: 33
End: 2018-07-02

## 2018-07-02 VITALS
DIASTOLIC BLOOD PRESSURE: 74 MMHG | BODY MASS INDEX: 43 KG/M2 | HEART RATE: 83 BPM | WEIGHT: 266 LBS | SYSTOLIC BLOOD PRESSURE: 109 MMHG

## 2018-07-02 DIAGNOSIS — Z34.93 ENCOUNTER FOR SUPERVISION OF NORMAL PREGNANCY IN THIRD TRIMESTER, UNSPECIFIED GRAVIDITY: Primary | ICD-10-CM

## 2018-07-02 LAB
MULTISTIX LOT#: NORMAL NUMERIC
PH, URINE: 6.5 (ref 4.5–8)
SPECIFIC GRAVITY: 1.02 (ref 1–1.03)
UROBILINOGEN,SEMI-QN: 0.2 MG/DL (ref 0–1.9)

## 2018-07-02 PROCEDURE — 81002 URINALYSIS NONAUTO W/O SCOPE: CPT | Performed by: OBSTETRICS & GYNECOLOGY

## 2018-07-03 ENCOUNTER — HOSPITAL ENCOUNTER (OUTPATIENT)
Facility: HOSPITAL | Age: 33
Setting detail: OBSERVATION
Discharge: HOME | End: 2018-07-03
Attending: OBSTETRICS & GYNECOLOGY | Admitting: OBSTETRICS & GYNECOLOGY
Payer: COMMERCIAL

## 2018-07-03 ENCOUNTER — TELEPHONE (OUTPATIENT)
Dept: OBGYN CLINIC | Facility: CLINIC | Age: 33
End: 2018-07-03

## 2018-07-03 VITALS — HEART RATE: 96 BPM | SYSTOLIC BLOOD PRESSURE: 112 MMHG | DIASTOLIC BLOOD PRESSURE: 61 MMHG

## 2018-07-03 PROCEDURE — 59025 FETAL NON-STRESS TEST: CPT | Performed by: OBSTETRICS & GYNECOLOGY

## 2018-07-03 NOTE — TRIAGE
[facility]      Triage Note    Margy Acevedomann Patient Status:  Observation    1985 MRN [id number]   Location [address]  Attending Ada Aldana, DO   Hosp Day # 0 PCP MD Gladys Ding

## 2018-07-03 NOTE — TELEPHONE ENCOUNTER
Pt is 40w3d and reports having contractions since last night. States contractions are becoming more painful 8/10 and are every 5-7 minutes for the past hour. Also reports having a brownish and pinkish mucous discharge.  Instructed pt to go to [facility] for evalua

## 2018-07-05 NOTE — TELEPHONE ENCOUNTER
Shaniqua from [phone] called to confirm patient off of work. Confirmed doctor took patient off of work and faxed off work note to [phone].

## 2018-07-06 ENCOUNTER — HOSPITAL ENCOUNTER (INPATIENT)
Facility: HOSPITAL | Age: 33
LOS: 1 days | Discharge: HOME | End: 2018-07-07
Attending: OBSTETRICS & GYNECOLOGY | Admitting: OBSTETRICS & GYNECOLOGY
Payer: COMMERCIAL

## 2018-07-06 ENCOUNTER — ANESTHESIA (OUTPATIENT)
Dept: OBGYN UNIT | Facility: HOSPITAL | Age: 33
End: 2018-07-06

## 2018-07-06 ENCOUNTER — ANESTHESIA EVENT (OUTPATIENT)
Dept: OBGYN UNIT | Facility: HOSPITAL | Age: 33
End: 2018-07-06

## 2018-07-06 LAB
ANTIBODY SCREEN: NEGATIVE
ERYTHROCYTE [DISTWIDTH] IN BLOOD BY AUTOMATED COUNT: 15 % (ref 11–15)
HCT VFR BLD AUTO: 32.2 % (ref 35–48)
HGB BLD-MCNC: 10.3 G/DL (ref 12–16)
MCH RBC QN AUTO: 24.8 PG (ref 27–32)
MCHC RBC AUTO-ENTMCNC: 32.1 G/DL (ref 32–37)
MCV RBC AUTO: 77.2 FL (ref 80–100)
PLATELET # BLD AUTO: 335 K/UL (ref 140–400)
PMV BLD AUTO: 8.1 FL (ref 7.4–10.3)
RBC # BLD AUTO: 4.17 M/UL (ref 3.7–5.4)
RH BLOOD TYPE: POSITIVE
WBC # BLD AUTO: 14.2 K/UL (ref 4–11)

## 2018-07-06 PROCEDURE — 85027 COMPLETE CBC AUTOMATED: CPT | Performed by: OBSTETRICS & GYNECOLOGY

## 2018-07-06 PROCEDURE — 59414 DELIVER PLACENTA: CPT | Performed by: NURSE PRACTITIONER

## 2018-07-06 PROCEDURE — 51701 INSERT BLADDER CATHETER: CPT | Performed by: NURSE PRACTITIONER

## 2018-07-06 PROCEDURE — 86900 BLOOD TYPING SEROLOGIC ABO: CPT | Performed by: OBSTETRICS & GYNECOLOGY

## 2018-07-06 PROCEDURE — 86901 BLOOD TYPING SEROLOGIC RH(D): CPT | Performed by: OBSTETRICS & GYNECOLOGY

## 2018-07-06 PROCEDURE — 36415 COLL VENOUS BLD VENIPUNCTURE: CPT | Performed by: NURSE PRACTITIONER

## 2018-07-06 PROCEDURE — 86850 RBC ANTIBODY SCREEN: CPT | Performed by: OBSTETRICS & GYNECOLOGY

## 2018-07-06 PROCEDURE — 10907ZC DRAINAGE OF AMNIOTIC FLUID, THERAPEUTIC FROM PRODUCTS OF CONCEPTION, VIA NATURAL OR ARTIFICIAL OPENING: ICD-10-PCS | Performed by: OBSTETRICS & GYNECOLOGY

## 2018-07-06 RX ORDER — AMMONIA INHALANTS 0.04 G/.3ML
0.3 INHALANT RESPIRATORY (INHALATION) AS NEEDED
Status: DISCONTINUED | OUTPATIENT
Start: 2018-07-06 | End: 2018-07-06

## 2018-07-06 RX ORDER — AMMONIA INHALANTS 0.04 G/.3ML
0.3 INHALANT RESPIRATORY (INHALATION) AS NEEDED
Status: DISCONTINUED | OUTPATIENT
Start: 2018-07-06 | End: 2018-07-07

## 2018-07-06 RX ORDER — LIDOCAINE HYDROCHLORIDE 10 MG/ML
INJECTION, SOLUTION EPIDURAL; INFILTRATION; INTRACAUDAL; PERINEURAL AS NEEDED
Status: DISCONTINUED | OUTPATIENT
Start: 2018-07-06 | End: 2018-07-06 | Stop reason: SURG

## 2018-07-06 RX ORDER — SIMETHICONE 80 MG
80 TABLET,CHEWABLE ORAL 3 TIMES DAILY PRN
Status: DISCONTINUED | OUTPATIENT
Start: 2018-07-06 | End: 2018-07-07

## 2018-07-06 RX ORDER — NALBUPHINE HCL 10 MG/ML
2.5 AMPUL (ML) INJECTION
Status: DISCONTINUED | OUTPATIENT
Start: 2018-07-06 | End: 2018-07-06

## 2018-07-06 RX ORDER — DOCUSATE SODIUM 100 MG/1
100 CAPSULE, LIQUID FILLED ORAL 2 TIMES DAILY
COMMUNITY
End: 2018-08-15

## 2018-07-06 RX ORDER — ONDANSETRON 2 MG/ML
INJECTION INTRAMUSCULAR; INTRAVENOUS
Status: COMPLETED
Start: 2018-07-06 | End: 2018-07-06

## 2018-07-06 RX ORDER — EPHEDRINE SULFATE/0.9% NACL/PF 25 MG/5 ML
5 SYRINGE (ML) INTRAVENOUS AS NEEDED
Status: DISCONTINUED | OUTPATIENT
Start: 2018-07-06 | End: 2018-07-06

## 2018-07-06 RX ORDER — EPHEDRINE SULFATE/0.9% NACL/PF 25 MG/5 ML
SYRINGE (ML) INTRAVENOUS
Status: COMPLETED
Start: 2018-07-06 | End: 2018-07-06

## 2018-07-06 RX ORDER — ONDANSETRON 2 MG/ML
4 INJECTION INTRAMUSCULAR; INTRAVENOUS EVERY 6 HOURS PRN
Status: DISCONTINUED | OUTPATIENT
Start: 2018-07-06 | End: 2018-07-06

## 2018-07-06 RX ORDER — TRISODIUM CITRATE DIHYDRATE AND CITRIC ACID MONOHYDRATE 500; 334 MG/5ML; MG/5ML
30 SOLUTION ORAL AS NEEDED
Status: DISCONTINUED | OUTPATIENT
Start: 2018-07-06 | End: 2018-07-06

## 2018-07-06 RX ORDER — IBUPROFEN 600 MG/1
600 TABLET ORAL ONCE AS NEEDED
Status: DISCONTINUED | OUTPATIENT
Start: 2018-07-06 | End: 2018-07-06

## 2018-07-06 RX ORDER — LIDOCAINE HYDROCHLORIDE AND EPINEPHRINE 20; 5 MG/ML; UG/ML
INJECTION, SOLUTION EPIDURAL; INFILTRATION; INTRACAUDAL; PERINEURAL
Status: DISCONTINUED
Start: 2018-07-06 | End: 2018-07-06 | Stop reason: WASHOUT

## 2018-07-06 RX ORDER — ONDANSETRON 2 MG/ML
4 INJECTION INTRAMUSCULAR; INTRAVENOUS ONCE
Status: COMPLETED | OUTPATIENT
Start: 2018-07-06 | End: 2018-07-06

## 2018-07-06 RX ORDER — ACETAMINOPHEN 325 MG/1
650 TABLET ORAL EVERY 6 HOURS PRN
Status: DISCONTINUED | OUTPATIENT
Start: 2018-07-06 | End: 2018-07-07

## 2018-07-06 RX ORDER — BUPIVACAINE HYDROCHLORIDE 2.5 MG/ML
INJECTION, SOLUTION EPIDURAL; INFILTRATION; INTRACAUDAL AS NEEDED
Status: DISCONTINUED | OUTPATIENT
Start: 2018-07-06 | End: 2018-07-06 | Stop reason: SURG

## 2018-07-06 RX ORDER — SODIUM CHLORIDE, SODIUM LACTATE, POTASSIUM CHLORIDE, CALCIUM CHLORIDE 600; 310; 30; 20 MG/100ML; MG/100ML; MG/100ML; MG/100ML
INJECTION, SOLUTION INTRAVENOUS
Status: COMPLETED
Start: 2018-07-06 | End: 2018-07-06

## 2018-07-06 RX ORDER — DOCUSATE SODIUM 100 MG/1
100 CAPSULE, LIQUID FILLED ORAL 2 TIMES DAILY
Status: DISCONTINUED | OUTPATIENT
Start: 2018-07-06 | End: 2018-07-07

## 2018-07-06 RX ORDER — DIAPER,BRIEF,INFANT-TODD,DISP
1 EACH MISCELLANEOUS EVERY 6 HOURS PRN
Status: DISCONTINUED | OUTPATIENT
Start: 2018-07-06 | End: 2018-07-07

## 2018-07-06 RX ORDER — DEXTROSE, SODIUM CHLORIDE, SODIUM LACTATE, POTASSIUM CHLORIDE, AND CALCIUM CHLORIDE 5; .6; .31; .03; .02 G/100ML; G/100ML; G/100ML; G/100ML; G/100ML
125 INJECTION, SOLUTION INTRAVENOUS CONTINUOUS
Status: DISCONTINUED | OUTPATIENT
Start: 2018-07-06 | End: 2018-07-06

## 2018-07-06 RX ORDER — BUPIVACAINE HYDROCHLORIDE 2.5 MG/ML
INJECTION, SOLUTION EPIDURAL; INFILTRATION; INTRACAUDAL
Status: COMPLETED
Start: 2018-07-06 | End: 2018-07-06

## 2018-07-06 RX ORDER — 0.9 % SODIUM CHLORIDE 0.9 %
VIAL (ML) INJECTION
Status: DISCONTINUED
Start: 2018-07-06 | End: 2018-07-06 | Stop reason: WASHOUT

## 2018-07-06 RX ORDER — BISACODYL 10 MG
10 SUPPOSITORY, RECTAL RECTAL ONCE AS NEEDED
Status: DISCONTINUED | OUTPATIENT
Start: 2018-07-06 | End: 2018-07-07

## 2018-07-06 RX ORDER — TERBUTALINE SULFATE 1 MG/ML
0.25 INJECTION, SOLUTION SUBCUTANEOUS AS NEEDED
Status: DISCONTINUED | OUTPATIENT
Start: 2018-07-06 | End: 2018-07-06

## 2018-07-06 RX ORDER — LIDOCAINE HYDROCHLORIDE AND EPINEPHRINE 15; 5 MG/ML; UG/ML
INJECTION, SOLUTION EPIDURAL AS NEEDED
Status: DISCONTINUED | OUTPATIENT
Start: 2018-07-06 | End: 2018-07-06 | Stop reason: SURG

## 2018-07-06 RX ORDER — SODIUM CHLORIDE 0.9 % (FLUSH) 0.9 %
10 SYRINGE (ML) INJECTION AS NEEDED
Status: DISCONTINUED | OUTPATIENT
Start: 2018-07-06 | End: 2018-07-06

## 2018-07-06 RX ORDER — IBUPROFEN 600 MG/1
600 TABLET ORAL EVERY 6 HOURS
Status: DISCONTINUED | OUTPATIENT
Start: 2018-07-06 | End: 2018-07-07

## 2018-07-06 RX ORDER — PRENATAL VIT,CAL 76/IRON/FOLIC 29 MG-1 MG
1 TABLET ORAL DAILY
Status: DISCONTINUED | OUTPATIENT
Start: 2018-07-06 | End: 2018-07-07

## 2018-07-06 RX ORDER — LIDOCAINE HYDROCHLORIDE 10 MG/ML
30 INJECTION, SOLUTION EPIDURAL; INFILTRATION; INTRACAUDAL; PERINEURAL ONCE
Status: DISCONTINUED | OUTPATIENT
Start: 2018-07-06 | End: 2018-07-06

## 2018-07-06 RX ORDER — SODIUM CHLORIDE 0.9 % (FLUSH) 0.9 %
10 SYRINGE (ML) INJECTION AS NEEDED
Status: DISCONTINUED | OUTPATIENT
Start: 2018-07-06 | End: 2018-07-07

## 2018-07-06 RX ORDER — ONDANSETRON 2 MG/ML
4 INJECTION INTRAMUSCULAR; INTRAVENOUS EVERY 6 HOURS PRN
Status: DISCONTINUED | OUTPATIENT
Start: 2018-07-06 | End: 2018-07-07

## 2018-07-06 RX ADMIN — LIDOCAINE HYDROCHLORIDE 3 ML: 10 INJECTION, SOLUTION EPIDURAL; INFILTRATION; INTRACAUDAL; PERINEURAL at 05:14:00

## 2018-07-06 RX ADMIN — BUPIVACAINE HYDROCHLORIDE 10 ML: 2.5 INJECTION, SOLUTION EPIDURAL; INFILTRATION; INTRACAUDAL at 05:20:00

## 2018-07-06 RX ADMIN — LIDOCAINE HYDROCHLORIDE AND EPINEPHRINE 5 ML: 15; 5 INJECTION, SOLUTION EPIDURAL at 05:19:00

## 2018-07-06 NOTE — DISCHARGE SUMMARY
[facility] - [facility]    Discharge Summary    Елена Patch Patient Status:  Observation    1985 MRN [id number]   Location [address]  Attending Cary Lindsey MD   [facility] Day # 0       Delivering OB Clinician:

## 2018-07-06 NOTE — L&D DELIVERY NOTE
[facility] HOSP - [facility]    Vaginal Delivery Note    Ledy Se Patient Status:  Observation    1985 MRN [id number]   Location [address]  Attending Allen Ralph MD   Hosp Day # 0 PCP Rakel Sheth MD     Delivery

## 2018-07-06 NOTE — H&P
[facility]    History & Physical    Phu Evans Patient Status:  Observation    1985 MRN [id number]   Location [address] Attending Erasto Vazquez MD   Hosp Day # 0 PCP Uday Tang MD     Date of Adm 1 2   SAB TAB Ectopic Multiple Live Births   1 0 0 0 2      # Outcome Date GA Lbr Oswaldo/2nd Weight Sex Delivery Anes PTL Lv   4 Current            3 Term 05/27/17 39w4d 03:39 / 00:05 7 lb 11.6 oz (3.505 kg) M NORMAL SPONT EPI N ALONDRA      Complications: Variab Product (PROBIOTIC-10 OR) Take by mouth. Disp:  Rfl:  7/5/2018 at 0900   prenatal multivitamin plus DHA 27-0.8-228 MG Oral Cap Take 1 capsule by mouth daily. Disp:  Rfl:  7/5/2018 at 0900   [facility].  Devices (BREAST PUMP) Does not apply Misc Double [id number] without esophagitis     Decreased fetal movement     Irregular contractions     Pregnancy     Obesity in pregnancy     History of precipitous labor and delivery     obstetric ultrasound scan, antepartum      Treatment Plan:    expectant management      Ris

## 2018-07-06 NOTE — ANESTHESIA PROCEDURE NOTES
Labor Analgesia  Performed by: Theresa Grace by: Osvaldo Ledesma     Patient Location:  OB  Start Time:  7/6/2018 5:10 AM  End Time:  7/6/2018 5:25 AM  Site Identification: surface landmarks    Reason for Block: labor epidural    Anes

## 2018-07-06 NOTE — ANESTHESIA PREPROCEDURE EVALUATION
Anesthesia PreOp Note    HPI:     Alvaro Hagen is a 34year old female who presents for preoperative consultation requested by: * No surgeons listed *    Date of Surgery: 7/6/2018    * No procedures listed *  Indication: * No pre-op diagnosis entered * MG Oral Tab Take 40 mg by mouth. Disp:  Rfl:  7/5/2018 at 2100   omeprazole 20 MG Oral Capsule Delayed Release Take 20 mg by mouth daily. Disp:  Rfl:  7/5/2018 at 2100   Probiotic Product (PROBIOTIC-10 OR) Take by mouth.  Disp:  Rfl:  7/5/2018 at 0900   pre Injection PRN Taylor Grace MD 3 mL at 07/06/18 0514    lidocaine-EPINEPHrine 1.5 %-1:[id number] injection   PRN Taylor Grace MD 5 mL at 07/06/18 0519    Bupivacaine HCl (PF) (MARCAINE) 0.25 % injection  Epidural PRN Taylor Grace MD 10 m 07/06/2018   RBC 4.17 07/06/2018   HGB 10.3 (L) 07/06/2018   HCT 32.2 (L) 07/06/2018   MCV 77.2 (L) 07/06/2018   MCH 24.8 (L) 07/06/2018   MCHC 32.1 07/06/2018   RDW 15.0 07/06/2018    07/06/2018   MPV 8.1 07/06/2018             Vital Signs:   Body m 5:56 AM

## 2018-07-07 VITALS
RESPIRATION RATE: 16 BRPM | WEIGHT: 266 LBS | TEMPERATURE: 98 F | OXYGEN SATURATION: 100 % | HEIGHT: 66 IN | DIASTOLIC BLOOD PRESSURE: 51 MMHG | SYSTOLIC BLOOD PRESSURE: 125 MMHG | HEART RATE: 64 BPM | BODY MASS INDEX: 42.75 KG/M2

## 2018-07-07 LAB
BASOPHILS # BLD: 0 K/UL (ref 0–0.2)
BASOPHILS NFR BLD: 0 %
EOSINOPHIL # BLD: 0.2 K/UL (ref 0–0.7)
EOSINOPHIL NFR BLD: 1 %
ERYTHROCYTE [DISTWIDTH] IN BLOOD BY AUTOMATED COUNT: 14.9 % (ref 11–15)
HCT VFR BLD AUTO: 30.7 % (ref 35–48)
HGB BLD-MCNC: 9.8 G/DL (ref 12–16)
LYMPHOCYTES # BLD: 3.1 K/UL (ref 1–4)
LYMPHOCYTES NFR BLD: 24 %
MCH RBC QN AUTO: 24.7 PG (ref 27–32)
MCHC RBC AUTO-ENTMCNC: 32 G/DL (ref 32–37)
MCV RBC AUTO: 77.1 FL (ref 80–100)
MONOCYTES # BLD: 0.7 K/UL (ref 0–1)
MONOCYTES NFR BLD: 6 %
NEUTROPHILS # BLD AUTO: 9 K/UL (ref 1.8–7.7)
NEUTROPHILS NFR BLD: 69 %
PLATELET # BLD AUTO: 303 K/UL (ref 140–400)
PMV BLD AUTO: 8.2 FL (ref 7.4–10.3)
RBC # BLD AUTO: 3.98 M/UL (ref 3.7–5.4)
WBC # BLD AUTO: 13 K/UL (ref 4–11)

## 2018-07-07 PROCEDURE — 85025 COMPLETE CBC W/AUTO DIFF WBC: CPT | Performed by: OBSTETRICS & GYNECOLOGY

## 2018-07-07 RX ORDER — IBUPROFEN 600 MG/1
600 TABLET ORAL EVERY 6 HOURS
Qty: 60 TABLET | Refills: 0 | Status: SHIPPED | OUTPATIENT
Start: 2018-07-07 | End: 2018-08-15

## 2018-07-07 NOTE — LACTATION NOTE
Mom reports Bf going well,Bf other babies,is confident. Discussed frequency and duration of bf,mom requested advice w/positioning w/larger breasts. [facility] assisted w/pillows for support of BF hold,observed mom BF well,effective latch w/swallows.  Demo'd lip rele

## 2018-07-07 NOTE — PROGRESS NOTES
Post-Partum Note   7/7/2018, 6:55 AM    Subjective:  Patient doing well. Pain is well controlled. She denies lightheadedness or dizziness. Her lochia is light. She is feeling well.      Objective:   07/06/18  1837 07/06/18  2025 07/07/18  0100 07/07/18  055

## 2018-07-07 NOTE — PROGRESS NOTES
Vitals and assessment WNL, pain managed with motrin PRN, Breast/bottle feeding. Wants a 24 d/c tomorrow. Will continue to monitor.

## 2018-07-08 NOTE — ADDENDUM NOTE
Encounter addended by: Lizzie Huber DO on: 7/8/2018 12:00 AM<BR>    Actions taken: Sign clinical note, Charge Capture section accepted

## 2018-07-09 NOTE — ANESTHESIA POSTPROCEDURE EVALUATION
Patient: Genie Bearden    Procedure Summary     Date:  07/06/18 Room / Location:      Anesthesia Start:  0510 Anesthesia Stop:  2722    Procedure:  LABOR ANALGESIA Diagnosis:      Scheduled Providers:   Anesthesiologist:  MD Sumaya Herron

## 2018-07-09 NOTE — PAYOR COMM NOTE
--------------  DISCHARGE REVIEW    Secondary Payor: N/A  Subscriber #:    Secondary Payor Authorization Number: N/A      Admit date: 7/6/18  Admit time:  0109  Discharge Date: 7/7/2018  6:28 PM     Admitting Physician: Vlad Randall MD  Attending Physic appointment in 6 weeks with Dr. Dragan Hastings  7/6/2018  2:06 PM        Electronically signed by Wally Paredes MD on 7/7/2018  6:56 AM         REVIEWER COMMENTS--------------  ADMISSION REVIEW     Payor: [facility]  Subscriber ?                              Pregnancy      Irregular contractions      Decreased fetal movement      Gastroesophageal reflux disease without esophagitis      Vertigo      Meningioma ([address]            Ok for vaginal delivery.  Consider Neuro if MUSCLE SURG PROC UNLISTED Bilateral  03/15/16: LAPAROSCOPIC CHOLECYSTECTOMY  2008: LASIK  2004: TONSILLECTOMY  2013: UPPER GI ENDOSCOPY; W/ENDOSCOPIC U/S ESOPHAGEA*    Social History:    Smoking status: Former Smoker     Quit date: 2/3/2009    Smokeless to Negative 11/01/2016   HBVSAG Nonreactive  12/04/2017   ABO A 07/06/2018   RH Positive 07/06/2018   WBC 14.2 (H) 07/06/2018   HGB 10.3 (L) 07/06/2018   HCT 32.2 (L) 07/06/2018    07/06/2018   CREATSERUM 0.52 01/12/2018   BUN 3 (L) 01/12/2018

## 2018-07-13 NOTE — ADDENDUM NOTE
Encounter addended by: Erica Rogers DO on: 7/13/2018  1:28 AM<BR>    Actions taken: Sign clinical note, Charge Capture section accepted

## 2018-07-23 ENCOUNTER — MED REC SCAN ONLY (OUTPATIENT)
Dept: OBGYN CLINIC | Facility: CLINIC | Age: 33
End: 2018-07-23

## 2018-08-15 ENCOUNTER — HOSPITAL ENCOUNTER (OUTPATIENT)
Age: 33
Discharge: HOME | End: 2018-08-15
Attending: FAMILY MEDICINE
Payer: COMMERCIAL

## 2018-08-15 VITALS
DIASTOLIC BLOOD PRESSURE: 77 MMHG | BODY MASS INDEX: 38.57 KG/M2 | SYSTOLIC BLOOD PRESSURE: 120 MMHG | WEIGHT: 240 LBS | HEIGHT: 66 IN | HEART RATE: 87 BPM | TEMPERATURE: 98 F | OXYGEN SATURATION: 100 % | RESPIRATION RATE: 18 BRPM

## 2018-08-15 DIAGNOSIS — N61.1 LEFT BREAST ABSCESS: Primary | ICD-10-CM

## 2018-08-15 DIAGNOSIS — N61.0 MASTITIS: ICD-10-CM

## 2018-08-15 PROCEDURE — 99214 OFFICE O/P EST MOD 30 MIN: CPT

## 2018-08-15 PROCEDURE — 87147 CULTURE TYPE IMMUNOLOGIC: CPT | Performed by: FAMILY MEDICINE

## 2018-08-15 PROCEDURE — 87070 CULTURE OTHR SPECIMN AEROBIC: CPT | Performed by: FAMILY MEDICINE

## 2018-08-15 PROCEDURE — 87186 SC STD MICRODIL/AGAR DIL: CPT | Performed by: FAMILY MEDICINE

## 2018-08-15 PROCEDURE — 87205 SMEAR GRAM STAIN: CPT | Performed by: FAMILY MEDICINE

## 2018-08-15 RX ORDER — SULFAMETHOXAZOLE AND TRIMETHOPRIM 800; 160 MG/1; MG/1
1 TABLET ORAL 2 TIMES DAILY
Qty: 20 TABLET | Refills: 0 | Status: SHIPPED | OUTPATIENT
Start: 2018-08-15 | End: 2018-08-23

## 2018-08-15 NOTE — ED INITIAL ASSESSMENT (HPI)
Pt presents to the IC with c/o left sided breast redness and swelling for the last 4 days. Pt tried to drain the site at home, and got a lot of green/yellow drainage. Site is dressed with gauze. Pt gave birth 5 weeks ago. Pt is currently breastfeeding.

## 2018-08-15 NOTE — ED PROVIDER NOTES
Pt seen in [facility]      Stated Complaint: Patient presents with:  Abscess (integumentary)      --------------   RN assessment:     r breast pain, discharge  --------------    CC: breast pain, abscess    HPI:  Maynor Taylor Drug use: No    Sexual activity: Yes    Partners: Male     Other Topics Concern    Caffeine Concern Yes    Comment: iced coffe 2 cups 2 x a week     Social History Narrative   None on file       Past Medical, Surgical, Family, Medication and allergy his breast: focal approx 2cm non-ripe abscess w/limited d/c noted; erythema, tenderness; localized, no areolar involvement   Neurologic:   CNII-XII intact, normal strength, sensation and reflexes     throughout     ---------------------------------------------

## 2018-08-23 ENCOUNTER — POSTPARTUM (OUTPATIENT)
Dept: OBGYN CLINIC | Facility: CLINIC | Age: 33
End: 2018-08-23
Payer: COMMERCIAL

## 2018-08-23 VITALS
SYSTOLIC BLOOD PRESSURE: 128 MMHG | WEIGHT: 247.63 LBS | BODY MASS INDEX: 40 KG/M2 | DIASTOLIC BLOOD PRESSURE: 76 MMHG | HEART RATE: 79 BPM

## 2018-08-23 NOTE — PROGRESS NOTES
Here for post-partum visit. Pt had a vaginal delivery on 7/6/18 with Dr. Elizabeth Deal. Infant- girl doing well. There were no complications. Pt is breast feeding. Pt desire Mirena IUD for contraception. Currently patient is not sexually active.  Mood is st Insertion procedure was described. Risks of uterine perforation, bleeding, infection especially in the first 21 days, IUD migration out of the uterus/expulsion after placement were mentioned. Pt is aware that IUDs do not prevent STIs.   Pt is also aware

## 2018-08-27 ENCOUNTER — OFFICE VISIT (OUTPATIENT)
Dept: OBGYN CLINIC | Facility: CLINIC | Age: 33
End: 2018-08-27
Payer: COMMERCIAL

## 2018-08-27 VITALS
SYSTOLIC BLOOD PRESSURE: 115 MMHG | WEIGHT: 247.38 LBS | HEART RATE: 75 BPM | BODY MASS INDEX: 40 KG/M2 | DIASTOLIC BLOOD PRESSURE: 78 MMHG

## 2018-08-27 DIAGNOSIS — Z30.430 ENCOUNTER FOR INSERTION OF INTRAUTERINE CONTRACEPTIVE DEVICE: ICD-10-CM

## 2018-08-27 DIAGNOSIS — Z32.00 PREGNANCY EXAMINATION OR TEST, PREGNANCY UNCONFIRMED: Primary | ICD-10-CM

## 2018-08-27 LAB
CONTROL LINE PRESENT WITH A CLEAR BACKGROUND (YES/NO): YES YES/NO
KIT LOT #: NORMAL NUMERIC

## 2018-08-27 PROCEDURE — 58300 INSERT INTRAUTERINE DEVICE: CPT | Performed by: OBSTETRICS & GYNECOLOGY

## 2018-08-27 PROCEDURE — 81025 URINE PREGNANCY TEST: CPT | Performed by: OBSTETRICS & GYNECOLOGY

## 2018-08-27 NOTE — PROCEDURES
IUD Insertion     Pregnancy Results: negative from urine test   Birth control method(s) used: None. LMP: -none  No sexual activity since delivery   Consent signed.   Procedure discussed with the patient in detail including indication, risks, benefits, alt

## 2018-09-27 ENCOUNTER — TELEPHONE (OUTPATIENT)
Dept: OBGYN CLINIC | Facility: CLINIC | Age: 33
End: 2018-09-27

## 2018-09-27 ENCOUNTER — OFFICE VISIT (OUTPATIENT)
Dept: OBGYN CLINIC | Facility: CLINIC | Age: 33
End: 2018-09-27
Payer: COMMERCIAL

## 2018-09-27 VITALS — DIASTOLIC BLOOD PRESSURE: 80 MMHG | SYSTOLIC BLOOD PRESSURE: 120 MMHG | HEART RATE: 84 BPM

## 2018-09-27 DIAGNOSIS — Z30.431 IUD CHECK UP: Primary | ICD-10-CM

## 2018-09-27 PROCEDURE — 99213 OFFICE O/P EST LOW 20 MIN: CPT | Performed by: OBSTETRICS & GYNECOLOGY

## 2018-09-27 NOTE — PROGRESS NOTES
Andi Gentile is a 34year old female [id number] Patient's last menstrual period was 09/18/2018. Patient presents with:  Gyn Problem: 1 month IUD check  Patient presents today for IUD follow up. She had Mirena IUD placed 8/27/18.  She states she has been bleed on file    Occupational History      Occupation: RN Oncology        Comment: [employer]    Tobacco Use      Smoking status: Former Smoker        Quit date: 2/3/2009        Years since quittin.6      Smokeless tobacco: Former User      Tobacco comment: per nex no abnormal discharge  Cervix:  Normal without tenderness on motion, IUD strings at the os  Uterus: normal in size, contour, position, mobility, without tenderness  Adnexa: normal without masses or tenderness  Perineum: normal      Assessment & Plan:  Barbara Walker

## 2018-09-27 NOTE — TELEPHONE ENCOUNTER
Please send letter to Tad Ng at fax [phone] stating that patient is cleared to return to work without restrictions.

## 2018-10-09 ENCOUNTER — TELEPHONE (OUTPATIENT)
Dept: OBGYN CLINIC | Facility: CLINIC | Age: 33
End: 2018-10-09

## 2018-10-09 NOTE — TELEPHONE ENCOUNTER
Pt needs return to work release starting 9-29-18    Pt dropped off form to be filled out; needs send out asap to:    [employer]: fax# [phone]    [facility]#[phone]    Pt will  original  Form at front [facility]

## 2018-10-10 NOTE — TELEPHONE ENCOUNTER
LMTCB. THERE IS NO PLACE ON THE FORM THAT ASKS FOR A DATE TO RETURN TO WORK. DO WE NEED TO TO INCLUDE THAT ON THE FAX COVER SHEET?  DOES THE FORM NEED TO BE FAXED TO [facility] ALSO?   PT REQUESTED RELEASE STARTING 9-29-18 BUT WE CANNOT USE THAT

## 2018-10-10 NOTE — TELEPHONE ENCOUNTER
Pt states if there is no date on the form to return to work, then do not fill it in on the cover sheet. Pt states that she wrote on the bottom of the sheet a fax number to return the form.   Pt states that we can only say she was cleared to return to wor

## 2019-03-12 ENCOUNTER — OFFICE VISIT (OUTPATIENT)
Dept: FAMILY MEDICINE CLINIC | Facility: CLINIC | Age: 34
End: 2019-03-12
Payer: COMMERCIAL

## 2019-03-12 VITALS
TEMPERATURE: 98 F | DIASTOLIC BLOOD PRESSURE: 76 MMHG | BODY MASS INDEX: 44.86 KG/M2 | HEART RATE: 72 BPM | HEIGHT: 64.4 IN | WEIGHT: 266 LBS | SYSTOLIC BLOOD PRESSURE: 118 MMHG

## 2019-03-12 DIAGNOSIS — F43.29 ADJUSTMENT DISORDER WITH OTHER SYMPTOM: ICD-10-CM

## 2019-03-12 DIAGNOSIS — Z00.00 ANNUAL PHYSICAL EXAM: Primary | ICD-10-CM

## 2019-03-12 DIAGNOSIS — M54.9 OTHER CHRONIC BACK PAIN: ICD-10-CM

## 2019-03-12 DIAGNOSIS — G89.29 OTHER CHRONIC BACK PAIN: ICD-10-CM

## 2019-03-12 PROCEDURE — 99395 PREV VISIT EST AGE 18-39: CPT | Performed by: FAMILY MEDICINE

## 2019-03-12 PROCEDURE — 99213 OFFICE O/P EST LOW 20 MIN: CPT | Performed by: FAMILY MEDICINE

## 2019-03-12 PROCEDURE — 99212 OFFICE O/P EST SF 10 MIN: CPT | Performed by: FAMILY MEDICINE

## 2019-03-15 ENCOUNTER — HOSPITAL ENCOUNTER (OUTPATIENT)
Age: 34
Discharge: HOME | End: 2019-03-15
Payer: COMMERCIAL

## 2019-03-15 VITALS
OXYGEN SATURATION: 100 % | HEIGHT: 66 IN | WEIGHT: 270 LBS | RESPIRATION RATE: 16 BRPM | TEMPERATURE: 98 F | HEART RATE: 84 BPM | DIASTOLIC BLOOD PRESSURE: 89 MMHG | BODY MASS INDEX: 43.39 KG/M2 | SYSTOLIC BLOOD PRESSURE: 134 MMHG

## 2019-03-15 DIAGNOSIS — S05.01XA ABRASION OF RIGHT CORNEA, INITIAL ENCOUNTER: Primary | ICD-10-CM

## 2019-03-15 PROCEDURE — 99204 OFFICE O/P NEW MOD 45 MIN: CPT

## 2019-03-15 PROCEDURE — 99203 OFFICE O/P NEW LOW 30 MIN: CPT

## 2019-03-15 RX ORDER — HYDROCODONE BITARTRATE AND ACETAMINOPHEN 5; 325 MG/1; MG/1
1-2 TABLET ORAL EVERY 6 HOURS PRN
Qty: 10 TABLET | Refills: 0 | Status: SHIPPED | OUTPATIENT
Start: 2019-03-15 | End: 2019-03-22

## 2019-03-15 RX ORDER — IBUPROFEN 600 MG/1
600 TABLET ORAL EVERY 6 HOURS PRN
Qty: 30 TABLET | Refills: 0 | Status: SHIPPED | OUTPATIENT
Start: 2019-03-15 | End: 2019-03-22

## 2019-03-15 RX ORDER — OFLOXACIN 3 MG/ML
2 SOLUTION/ DROPS OPHTHALMIC 4 TIMES DAILY
Qty: 1 BOTTLE | Refills: 0 | Status: SHIPPED | OUTPATIENT
Start: 2019-03-15 | End: 2019-03-22

## 2019-03-16 NOTE — ED PROVIDER NOTES
Patient Seen in: [facility]    History   Patient presents with:   Eye Visual Problem (opthalmic)    Stated Complaint: Eye injury/scratch    HPI  54-year-old female presents with right eye pain and tearing after she was scratched Temp 97.6 °F (36.4 °C)   Temp src Temporal   SpO2 100 %   O2 Device None (Room air)       Current:/89   Pulse 84   Temp 97.6 °F (36.4 °C) (Temporal)   Resp 16   Ht 167.6 cm (5' 6\")   Wt 122.5 kg   LMP 02/15/2019 (Approximate)   SpO2 100%   Breastfee Take 1 tablet (600 mg total) by mouth every 6 (six) hours as needed for Pain or Fever. Qty: 30 tablet Refills: 0    ofloxacin 0.3 % Ophthalmic Solution  Place 2 drops into the right eye 4 (four) times daily for 7 days.   Qty: 1 Bottle Refills: 0    HYDROco

## 2019-03-21 NOTE — PROGRESS NOTES
HPI:    Patient ID: Domenica Fagan is a 28year old female. Patient experiencing tremendous amount of stress lately. Here for physical.        Review of Systems   Constitutional: Positive for fatigue.  Negative for activity change, appetite change, chills She exhibits no distension. There is no tenderness. Musculoskeletal: She exhibits tenderness. She exhibits no edema or deformity. Neurological: She is alert and oriented to person, place, and time. She has normal reflexes. No cranial nerve deficit.

## 2019-03-22 ENCOUNTER — LAB ENCOUNTER (OUTPATIENT)
Dept: LAB | Age: 34
End: 2019-03-22
Attending: FAMILY MEDICINE
Payer: COMMERCIAL

## 2019-03-22 DIAGNOSIS — Z00.00 ANNUAL PHYSICAL EXAM: ICD-10-CM

## 2019-03-22 LAB
ALBUMIN SERPL-MCNC: 4 G/DL (ref 3.4–5)
ALBUMIN/GLOB SERPL: 1.1 {RATIO} (ref 1–2)
ALP LIVER SERPL-CCNC: 90 U/L (ref 37–98)
ALT SERPL-CCNC: 27 U/L (ref 13–56)
ANION GAP SERPL CALC-SCNC: 7 MMOL/L (ref 0–18)
AST SERPL-CCNC: 16 U/L (ref 15–37)
BASOPHILS # BLD AUTO: 0.04 X10(3) UL (ref 0–0.2)
BASOPHILS NFR BLD AUTO: 0.4 %
BILIRUB SERPL-MCNC: 0.5 MG/DL (ref 0.1–2)
BUN BLD-MCNC: 12 MG/DL (ref 7–18)
BUN/CREAT SERPL: 15.8 (ref 10–20)
CALCIUM BLD-MCNC: 8.9 MG/DL (ref 8.5–10.1)
CHLORIDE SERPL-SCNC: 107 MMOL/L (ref 98–107)
CHOLEST SMN-MCNC: 140 MG/DL (ref ?–200)
CO2 SERPL-SCNC: 25 MMOL/L (ref 21–32)
CREAT BLD-MCNC: 0.76 MG/DL (ref 0.55–1.02)
DEPRECATED RDW RBC AUTO: 41.7 FL (ref 35.1–46.3)
EOSINOPHIL # BLD AUTO: 0.31 X10(3) UL (ref 0–0.7)
EOSINOPHIL NFR BLD AUTO: 3 %
ERYTHROCYTE [DISTWIDTH] IN BLOOD BY AUTOMATED COUNT: 13.6 % (ref 11–15)
GLOBULIN PLAS-MCNC: 3.5 G/DL (ref 2.8–4.4)
GLUCOSE BLD-MCNC: 99 MG/DL (ref 70–99)
HCT VFR BLD AUTO: 36.6 % (ref 35–48)
HDLC SERPL-MCNC: 52 MG/DL (ref 40–59)
HGB BLD-MCNC: 11.5 G/DL (ref 12–16)
IMM GRANULOCYTES # BLD AUTO: 0.02 X10(3) UL (ref 0–1)
IMM GRANULOCYTES NFR BLD: 0.2 %
LDLC SERPL CALC-MCNC: 74 MG/DL (ref ?–100)
LYMPHOCYTES # BLD AUTO: 3.21 X10(3) UL (ref 1–4)
LYMPHOCYTES NFR BLD AUTO: 31.3 %
M PROTEIN MFR SERPL ELPH: 7.5 G/DL (ref 6.4–8.2)
MCH RBC QN AUTO: 26.4 PG (ref 26–34)
MCHC RBC AUTO-ENTMCNC: 31.4 G/DL (ref 31–37)
MCV RBC AUTO: 83.9 FL (ref 80–100)
MONOCYTES # BLD AUTO: 0.66 X10(3) UL (ref 0.1–1)
MONOCYTES NFR BLD AUTO: 6.4 %
NEUTROPHILS # BLD AUTO: 6.02 X10 (3) UL (ref 1.5–7.7)
NEUTROPHILS # BLD AUTO: 6.02 X10(3) UL (ref 1.5–7.7)
NEUTROPHILS NFR BLD AUTO: 58.7 %
NONHDLC SERPL-MCNC: 88 MG/DL (ref ?–130)
OSMOLALITY SERPL CALC.SUM OF ELEC: 288 MOSM/KG (ref 275–295)
PLATELET # BLD AUTO: 383 10(3)UL (ref 150–450)
POTASSIUM SERPL-SCNC: 4.4 MMOL/L (ref 3.5–5.1)
RBC # BLD AUTO: 4.36 X10(6)UL (ref 3.8–5.3)
SODIUM SERPL-SCNC: 139 MMOL/L (ref 136–145)
TRIGL SERPL-MCNC: 72 MG/DL (ref 30–149)
TSI SER-ACNC: 2.04 MIU/ML (ref 0.36–3.74)
VLDLC SERPL CALC-MCNC: 14 MG/DL (ref 0–30)
WBC # BLD AUTO: 10.3 X10(3) UL (ref 4–11)

## 2019-03-22 PROCEDURE — 84443 ASSAY THYROID STIM HORMONE: CPT

## 2019-03-22 PROCEDURE — 36415 COLL VENOUS BLD VENIPUNCTURE: CPT

## 2019-03-22 PROCEDURE — 80053 COMPREHEN METABOLIC PANEL: CPT

## 2019-03-22 PROCEDURE — 85025 COMPLETE CBC W/AUTO DIFF WBC: CPT

## 2019-03-22 PROCEDURE — 80061 LIPID PANEL: CPT

## 2019-03-25 ENCOUNTER — HOSPITAL ENCOUNTER (OUTPATIENT)
Age: 34
Discharge: HOME | End: 2019-03-25
Attending: FAMILY MEDICINE
Payer: COMMERCIAL

## 2019-03-25 VITALS
SYSTOLIC BLOOD PRESSURE: 121 MMHG | RESPIRATION RATE: 18 BRPM | TEMPERATURE: 98 F | DIASTOLIC BLOOD PRESSURE: 77 MMHG | OXYGEN SATURATION: 98 % | HEIGHT: 66 IN | HEART RATE: 89 BPM | WEIGHT: 266 LBS | BODY MASS INDEX: 42.75 KG/M2

## 2019-03-25 DIAGNOSIS — J06.9 UPPER RESPIRATORY TRACT INFECTION, UNSPECIFIED TYPE: Primary | ICD-10-CM

## 2019-03-25 DIAGNOSIS — K12.2 UVULITIS: ICD-10-CM

## 2019-03-25 LAB
POCT INFLUENZA A: NEGATIVE
POCT INFLUENZA B: NEGATIVE
POCT RAPID STREP: NEGATIVE

## 2019-03-25 PROCEDURE — 99214 OFFICE O/P EST MOD 30 MIN: CPT

## 2019-03-25 PROCEDURE — 87430 STREP A AG IA: CPT | Performed by: FAMILY MEDICINE

## 2019-03-25 PROCEDURE — 87502 INFLUENZA DNA AMP PROBE: CPT | Performed by: FAMILY MEDICINE

## 2019-03-25 PROCEDURE — 87081 CULTURE SCREEN ONLY: CPT | Performed by: FAMILY MEDICINE

## 2019-03-25 RX ORDER — PREDNISONE 20 MG/1
20 TABLET ORAL DAILY
Qty: 5 TABLET | Refills: 0 | Status: SHIPPED | OUTPATIENT
Start: 2019-03-25 | End: 2019-03-30

## 2019-03-25 NOTE — ED PROVIDER NOTES
Patient Seen in: [facility]    History   Patient presents with:  Cough    Stated Complaint: cough sore throat    HPI    28year old female presents for sore throat and cough.   Patient states she started with cold symptoms 3 days src Oral   SpO2 98 %   O2 Device None (Room air)       Current:/77   Pulse 89   Temp 97.9 °F (36.6 °C) (Oral)   Resp 18   Ht 167.6 cm (5' 6\")   Wt 120.7 kg   LMP  (LMP Unknown)   SpO2 98%   BMI 42.93 kg/m²         Physical Exam   Constitutional: She AM    START taking these medications    predniSONE 20 MG Oral Tab  Take 1 tablet (20 mg total) by mouth daily for 5 days. , Normal, Disp-5 tablet, R-0

## 2019-07-23 ENCOUNTER — HOSPITAL ENCOUNTER (OUTPATIENT)
Age: 34
Discharge: HOME | End: 2019-07-23
Attending: FAMILY MEDICINE
Payer: COMMERCIAL

## 2019-07-23 ENCOUNTER — APPOINTMENT (OUTPATIENT)
Dept: GENERAL RADIOLOGY | Age: 34
End: 2019-07-23
Attending: FAMILY MEDICINE
Payer: COMMERCIAL

## 2019-07-23 ENCOUNTER — APPOINTMENT (OUTPATIENT)
Dept: CT IMAGING | Age: 34
End: 2019-07-23
Attending: FAMILY MEDICINE
Payer: COMMERCIAL

## 2019-07-23 VITALS
TEMPERATURE: 99 F | HEART RATE: 107 BPM | SYSTOLIC BLOOD PRESSURE: 109 MMHG | DIASTOLIC BLOOD PRESSURE: 88 MMHG | OXYGEN SATURATION: 98 % | RESPIRATION RATE: 20 BRPM

## 2019-07-23 DIAGNOSIS — D72.829 LEUKOCYTOSIS, UNSPECIFIED TYPE: ICD-10-CM

## 2019-07-23 DIAGNOSIS — M54.16 LUMBAR RADICULOPATHY: Primary | ICD-10-CM

## 2019-07-23 DIAGNOSIS — J01.90 ACUTE SINUSITIS, RECURRENCE NOT SPECIFIED, UNSPECIFIED LOCATION: ICD-10-CM

## 2019-07-23 LAB
#MXD IC: 0.7 X10ˆ3/UL (ref 0.1–1)
CREAT BLD-MCNC: 0.6 MG/DL (ref 0.55–1.02)
DDIMER WHOLE BLOOD: <100 NG/ML DDU (ref ?–400)
GLUCOSE BLD-MCNC: 98 MG/DL (ref 70–99)
HCT VFR BLD AUTO: 38.5 % (ref 35–48)
HGB BLD-MCNC: 13.1 G/DL (ref 12–16)
ISTAT BUN: 7 MG/DL (ref 8–20)
ISTAT CHLORIDE: 103 MMOL/L (ref 101–111)
ISTAT HEMATOCRIT: 38 % (ref 34–50)
ISTAT IONIZED CALCIUM FOR CHEM 8: 1.17 MMOL/L (ref 1.12–1.32)
ISTAT POTASSIUM: 3.8 MMOL/L (ref 3.6–5.1)
ISTAT SODIUM: 138 MMOL/L (ref 136–145)
ISTAT TROPONIN: <0.1 NG/ML (ref ?–0.1)
LYMPHOCYTES # BLD AUTO: 2.1 X10ˆ3/UL (ref 1–4)
LYMPHOCYTES NFR BLD AUTO: 12.8 %
MCH RBC QN AUTO: 28.1 PG (ref 26–34)
MCHC RBC AUTO-ENTMCNC: 34 G/DL (ref 31–37)
MCV RBC AUTO: 82.4 FL (ref 80–100)
MIXED CELL %: 4.4 %
NEUTROPHILS # BLD AUTO: 13.9 X10ˆ3/UL (ref 1.5–7.7)
NEUTROPHILS NFR BLD AUTO: 82.8 %
PLATELET # BLD AUTO: 412 X10ˆ3/UL (ref 150–450)
POCT BILIRUBIN URINE: NEGATIVE
POCT BLOOD URINE: NEGATIVE
POCT GLUCOSE URINE: NEGATIVE MG/DL
POCT KETONE URINE: NEGATIVE MG/DL
POCT LEUKOCYTE ESTERASE URINE: NEGATIVE
POCT NITRITE URINE: NEGATIVE
POCT PH URINE: 5.5 (ref 5–8)
POCT PROTEIN URINE: NEGATIVE MG/DL
POCT RAPID STREP: NEGATIVE
POCT SPECIFIC GRAVITY URINE: 1.01
POCT URINE PREGNANCY: NEGATIVE
POCT UROBILINOGEN URINE: 0.2 MG/DL
RBC # BLD AUTO: 4.67 X10ˆ6/UL (ref 3.8–5.3)
WBC # BLD AUTO: 16.7 X10ˆ3/UL (ref 4–11)

## 2019-07-23 PROCEDURE — 87081 CULTURE SCREEN ONLY: CPT | Performed by: FAMILY MEDICINE

## 2019-07-23 PROCEDURE — 84484 ASSAY OF TROPONIN QUANT: CPT

## 2019-07-23 PROCEDURE — 96361 HYDRATE IV INFUSION ADD-ON: CPT

## 2019-07-23 PROCEDURE — 81025 URINE PREGNANCY TEST: CPT | Performed by: FAMILY MEDICINE

## 2019-07-23 PROCEDURE — 99215 OFFICE O/P EST HI 40 MIN: CPT

## 2019-07-23 PROCEDURE — 85378 FIBRIN DEGRADE SEMIQUANT: CPT | Performed by: FAMILY MEDICINE

## 2019-07-23 PROCEDURE — 87430 STREP A AG IA: CPT | Performed by: FAMILY MEDICINE

## 2019-07-23 PROCEDURE — 71046 X-RAY EXAM CHEST 2 VIEWS: CPT | Performed by: FAMILY MEDICINE

## 2019-07-23 PROCEDURE — 74177 CT ABD & PELVIS W/CONTRAST: CPT | Performed by: FAMILY MEDICINE

## 2019-07-23 PROCEDURE — 80047 BASIC METABLC PNL IONIZED CA: CPT

## 2019-07-23 PROCEDURE — 93010 ELECTROCARDIOGRAM REPORT: CPT

## 2019-07-23 PROCEDURE — 93005 ELECTROCARDIOGRAM TRACING: CPT

## 2019-07-23 PROCEDURE — 81002 URINALYSIS NONAUTO W/O SCOPE: CPT | Performed by: FAMILY MEDICINE

## 2019-07-23 PROCEDURE — 85025 COMPLETE CBC W/AUTO DIFF WBC: CPT | Performed by: FAMILY MEDICINE

## 2019-07-23 PROCEDURE — 96374 THER/PROPH/DIAG INJ IV PUSH: CPT

## 2019-07-23 RX ORDER — TIZANIDINE 2 MG/1
2 TABLET ORAL EVERY 8 HOURS PRN
Qty: 15 TABLET | Refills: 0 | Status: SHIPPED | OUTPATIENT
Start: 2019-07-23 | End: 2019-07-28

## 2019-07-23 RX ORDER — SODIUM CHLORIDE 9 MG/ML
1000 INJECTION, SOLUTION INTRAVENOUS ONCE
Status: COMPLETED | OUTPATIENT
Start: 2019-07-23 | End: 2019-07-23

## 2019-07-23 RX ORDER — SODIUM CHLORIDE 9 MG/ML
INJECTION, SOLUTION INTRAVENOUS ONCE
Status: COMPLETED | OUTPATIENT
Start: 2019-07-23 | End: 2019-07-23

## 2019-07-23 RX ORDER — NAPROXEN 500 MG/1
500 TABLET ORAL 2 TIMES DAILY PRN
Qty: 20 TABLET | Refills: 0 | Status: SHIPPED | OUTPATIENT
Start: 2019-07-23 | End: 2019-07-30

## 2019-07-23 RX ORDER — AMOXICILLIN AND CLAVULANATE POTASSIUM 875; 125 MG/1; MG/1
1 TABLET, FILM COATED ORAL 2 TIMES DAILY
Qty: 20 TABLET | Refills: 0 | Status: SHIPPED | OUTPATIENT
Start: 2019-07-23 | End: 2019-08-02

## 2019-07-23 RX ORDER — SODIUM CHLORIDE 9 MG/ML
INJECTION, SOLUTION INTRAVENOUS ONCE
Status: DISCONTINUED | OUTPATIENT
Start: 2019-07-23 | End: 2019-07-23

## 2019-07-23 RX ORDER — KETOROLAC TROMETHAMINE 30 MG/ML
30 INJECTION, SOLUTION INTRAMUSCULAR; INTRAVENOUS ONCE
Status: COMPLETED | OUTPATIENT
Start: 2019-07-23 | End: 2019-07-23

## 2019-07-23 NOTE — ED INITIAL ASSESSMENT (HPI)
Pt c/o low back pain 10/10 with right lower extremity numbness/tingling that she woke up with today. Pt states she has chronic lower back pain x 4 years. Denies any incontinence. Pt denies weakness. Pt states she has not taken anything for the pain.

## 2019-07-24 LAB
ATRIAL RATE: 120 BPM
P AXIS: 64 DEGREES
P-R INTERVAL: 114 MS
Q-T INTERVAL: 306 MS
QRS DURATION: 68 MS
QTC CALCULATION (BEZET): 432 MS
R AXIS: -12 DEGREES
T AXIS: 40 DEGREES
VENTRICULAR RATE: 120 BPM

## 2019-07-24 NOTE — ED PROVIDER NOTES
Patient Seen in: [facility]    History   Patient presents with:  Back Pain (musculoskeletal)    Stated Complaint: back pain    HPI  59-year-old female presents with complaints of right-sided lower back pain associated with tingl Quit date: 2/3/2009        Years since quitting: 10.4      Smokeless tobacco: Former User      Tobacco comment: per nextgen - no    Alcohol use: No      Alcohol/week: 0.0 standard drinks      Comment: None.      Drug use: No      Review of Systems    Po within normal limits   POCT URINALYSIS DIPSTICK - Abnormal; Notable for the following components:    Urine Clarity Slightly cloudy (*)     All other components within normal limits   POCT ISTAT CHEM8 CARTRIDGE - Abnormal; Notable for the following componen right lower quadrant pain radiates to back and down right leg. CONTRAST USED:  100cc of Omnipaque 350  FINDINGS:  LIVER:  No enlargement, atrophy, abnormal density, or significant focal lesion. BILIARY:  Sequelae of cholecystectomy is noted.  PANCREAS: sinus symptoms with congestion, cough for the past 2 days. Her chest x-ray is negative. Abdomen pelvis CT shows no acute intra-abdominal findings.   Prescribed Naprosyn, Zanaflex for her back pain and recommend her to follow-up with back specialist for fu

## 2019-09-23 PROBLEM — R51.9 RIGHT-SIDED HEADACHE: Status: ACTIVE | Noted: 2019-09-23

## 2019-09-23 PROBLEM — F43.9 STRESS AT HOME: Status: ACTIVE | Noted: 2019-09-23

## 2019-09-23 PROBLEM — E66.01 MORBID OBESITY WITH BMI OF 40.0-44.9, ADULT (HCC): Status: ACTIVE | Noted: 2017-02-27

## 2019-09-23 PROBLEM — F43.23 ADJUSTMENT REACTION WITH ANXIETY AND DEPRESSION: Status: ACTIVE | Noted: 2019-09-23

## 2019-09-23 PROBLEM — Z86.018 HISTORY OF MENINGIOMA: Status: ACTIVE | Noted: 2019-09-23

## 2019-09-23 NOTE — PROGRESS NOTES
HPI:    Patient ID: Domenica Fagan is a 28year old female.     HPI  Patient presents with:  Establish Care  Headache: constant intensity changes      patient here for establishing care   just diagnosed with hogkins lymphoma, with c/o fatigue  Will be 0     Allergies:No Known Allergies   PHYSICAL EXAM:   Patient presents with:  Establish Care  Headache: constant intensity changes      Physical Exam   Constitutional: She is oriented to person, place, and time.  She appears well-developed and well-nourishe days a week. Avoid skipping meals. Making healthy choices for snacks and also limiting sugary beverages.      - HEMOGLOBIN A1C; Future    5. Dizziness  Check labs and mri  - MRI BRAIN (W+WO) (CPT=70553); Future  - NEURO - INTERNAL    6.  Need for influenz

## 2019-09-24 ENCOUNTER — LAB ENCOUNTER (OUTPATIENT)
Dept: LAB | Facility: HOSPITAL | Age: 34
End: 2019-09-24
Attending: FAMILY MEDICINE
Payer: COMMERCIAL

## 2019-09-24 DIAGNOSIS — E66.01 MORBID OBESITY WITH BMI OF 40.0-44.9, ADULT (HCC): ICD-10-CM

## 2019-09-24 DIAGNOSIS — R51.9 RIGHT-SIDED HEADACHE: ICD-10-CM

## 2019-09-24 LAB
ALBUMIN SERPL-MCNC: 3.7 G/DL (ref 3.4–5)
ALBUMIN/GLOB SERPL: 1 {RATIO} (ref 1–2)
ALP LIVER SERPL-CCNC: 98 U/L (ref 37–98)
ALT SERPL-CCNC: 22 U/L (ref 13–56)
ANION GAP SERPL CALC-SCNC: 6 MMOL/L (ref 0–18)
AST SERPL-CCNC: 9 U/L (ref 15–37)
BASOPHILS # BLD AUTO: 0.04 X10(3) UL (ref 0–0.2)
BASOPHILS NFR BLD AUTO: 0.4 %
BILIRUB SERPL-MCNC: 0.4 MG/DL (ref 0.1–2)
BUN BLD-MCNC: 8 MG/DL (ref 7–18)
BUN/CREAT SERPL: 11.4 (ref 10–20)
CALCIUM BLD-MCNC: 9.2 MG/DL (ref 8.5–10.1)
CHLORIDE SERPL-SCNC: 109 MMOL/L (ref 98–112)
CO2 SERPL-SCNC: 25 MMOL/L (ref 21–32)
CREAT BLD-MCNC: 0.7 MG/DL (ref 0.55–1.02)
DEPRECATED RDW RBC AUTO: 40.2 FL (ref 35.1–46.3)
EOSINOPHIL # BLD AUTO: 0.36 X10(3) UL (ref 0–0.7)
EOSINOPHIL NFR BLD AUTO: 3.4 %
ERYTHROCYTE [DISTWIDTH] IN BLOOD BY AUTOMATED COUNT: 13.2 % (ref 11–15)
ERYTHROCYTE [SEDIMENTATION RATE] IN BLOOD: 19 MM/HR (ref 0–20)
EST. AVERAGE GLUCOSE BLD GHB EST-MCNC: 108 MG/DL (ref 68–126)
GLOBULIN PLAS-MCNC: 3.7 G/DL (ref 2.8–4.4)
GLUCOSE BLD-MCNC: 89 MG/DL (ref 70–99)
HBA1C MFR BLD HPLC: 5.4 % (ref ?–5.7)
HCT VFR BLD AUTO: 38 % (ref 35–48)
HGB BLD-MCNC: 12.2 G/DL (ref 12–16)
IMM GRANULOCYTES # BLD AUTO: 0.02 X10(3) UL (ref 0–1)
IMM GRANULOCYTES NFR BLD: 0.2 %
LYMPHOCYTES # BLD AUTO: 3.15 X10(3) UL (ref 1–4)
LYMPHOCYTES NFR BLD AUTO: 30.1 %
M PROTEIN MFR SERPL ELPH: 7.4 G/DL (ref 6.4–8.2)
MCH RBC QN AUTO: 27.1 PG (ref 26–34)
MCHC RBC AUTO-ENTMCNC: 32.1 G/DL (ref 31–37)
MCV RBC AUTO: 84.3 FL (ref 80–100)
MONOCYTES # BLD AUTO: 0.62 X10(3) UL (ref 0.1–1)
MONOCYTES NFR BLD AUTO: 5.9 %
NEUTROPHILS # BLD AUTO: 6.29 X10 (3) UL (ref 1.5–7.7)
NEUTROPHILS # BLD AUTO: 6.29 X10(3) UL (ref 1.5–7.7)
NEUTROPHILS NFR BLD AUTO: 60 %
OSMOLALITY SERPL CALC.SUM OF ELEC: 288 MOSM/KG (ref 275–295)
PATIENT FASTING: NO
PLATELET # BLD AUTO: 401 10(3)UL (ref 150–450)
POTASSIUM SERPL-SCNC: 3.9 MMOL/L (ref 3.5–5.1)
RBC # BLD AUTO: 4.51 X10(6)UL (ref 3.8–5.3)
SODIUM SERPL-SCNC: 140 MMOL/L (ref 136–145)
WBC # BLD AUTO: 10.5 X10(3) UL (ref 4–11)

## 2019-09-24 PROCEDURE — 85652 RBC SED RATE AUTOMATED: CPT

## 2019-09-24 PROCEDURE — 36415 COLL VENOUS BLD VENIPUNCTURE: CPT

## 2019-09-24 PROCEDURE — 83036 HEMOGLOBIN GLYCOSYLATED A1C: CPT

## 2019-09-24 PROCEDURE — 85025 COMPLETE CBC W/AUTO DIFF WBC: CPT

## 2019-09-24 PROCEDURE — 80053 COMPREHEN METABOLIC PANEL: CPT

## 2019-10-04 ENCOUNTER — HOSPITAL ENCOUNTER (OUTPATIENT)
Dept: MRI IMAGING | Facility: HOSPITAL | Age: 34
Discharge: HOME | End: 2019-10-04
Attending: FAMILY MEDICINE
Payer: COMMERCIAL

## 2019-10-04 DIAGNOSIS — R42 DIZZINESS: ICD-10-CM

## 2019-10-04 DIAGNOSIS — R51.9 RIGHT-SIDED HEADACHE: ICD-10-CM

## 2019-10-04 DIAGNOSIS — Z86.018 HISTORY OF MENINGIOMA: ICD-10-CM

## 2019-10-04 PROCEDURE — 70553 MRI BRAIN STEM W/O & W/DYE: CPT | Performed by: FAMILY MEDICINE

## 2019-10-04 PROCEDURE — A9575 INJ GADOTERATE MEGLUMI 0.1ML: HCPCS | Performed by: FAMILY MEDICINE

## 2019-10-08 DIAGNOSIS — J34.89 SINUS MUCOSAL THICKENING: ICD-10-CM

## 2019-10-08 DIAGNOSIS — J35.2 ADENOID ENLARGEMENT: Primary | ICD-10-CM

## 2019-10-12 ENCOUNTER — HOSPITAL ENCOUNTER (EMERGENCY)
Facility: HOSPITAL | Age: 34
Discharge: HOME | End: 2019-10-13
Attending: STUDENT IN AN ORGANIZED HEALTH CARE EDUCATION/TRAINING PROGRAM
Payer: COMMERCIAL

## 2019-10-12 ENCOUNTER — APPOINTMENT (OUTPATIENT)
Dept: ULTRASOUND IMAGING | Facility: HOSPITAL | Age: 34
End: 2019-10-12
Attending: STUDENT IN AN ORGANIZED HEALTH CARE EDUCATION/TRAINING PROGRAM
Payer: COMMERCIAL

## 2019-10-12 DIAGNOSIS — R10.9 ABDOMINAL PAIN, ACUTE: Primary | ICD-10-CM

## 2019-10-12 DIAGNOSIS — K52.9 ACUTE COLITIS: ICD-10-CM

## 2019-10-12 PROCEDURE — 86901 BLOOD TYPING SEROLOGIC RH(D): CPT | Performed by: STUDENT IN AN ORGANIZED HEALTH CARE EDUCATION/TRAINING PROGRAM

## 2019-10-12 PROCEDURE — 86850 RBC ANTIBODY SCREEN: CPT | Performed by: STUDENT IN AN ORGANIZED HEALTH CARE EDUCATION/TRAINING PROGRAM

## 2019-10-12 PROCEDURE — 93975 VASCULAR STUDY: CPT | Performed by: STUDENT IN AN ORGANIZED HEALTH CARE EDUCATION/TRAINING PROGRAM

## 2019-10-12 PROCEDURE — 99285 EMERGENCY DEPT VISIT HI MDM: CPT

## 2019-10-12 PROCEDURE — 99284 EMERGENCY DEPT VISIT MOD MDM: CPT

## 2019-10-12 PROCEDURE — 76856 US EXAM PELVIC COMPLETE: CPT | Performed by: STUDENT IN AN ORGANIZED HEALTH CARE EDUCATION/TRAINING PROGRAM

## 2019-10-12 PROCEDURE — 84702 CHORIONIC GONADOTROPIN TEST: CPT | Performed by: STUDENT IN AN ORGANIZED HEALTH CARE EDUCATION/TRAINING PROGRAM

## 2019-10-12 PROCEDURE — 76830 TRANSVAGINAL US NON-OB: CPT | Performed by: STUDENT IN AN ORGANIZED HEALTH CARE EDUCATION/TRAINING PROGRAM

## 2019-10-12 PROCEDURE — 96361 HYDRATE IV INFUSION ADD-ON: CPT

## 2019-10-12 PROCEDURE — 96374 THER/PROPH/DIAG INJ IV PUSH: CPT

## 2019-10-12 PROCEDURE — 86900 BLOOD TYPING SEROLOGIC ABO: CPT | Performed by: STUDENT IN AN ORGANIZED HEALTH CARE EDUCATION/TRAINING PROGRAM

## 2019-10-12 PROCEDURE — 80053 COMPREHEN METABOLIC PANEL: CPT | Performed by: STUDENT IN AN ORGANIZED HEALTH CARE EDUCATION/TRAINING PROGRAM

## 2019-10-12 PROCEDURE — 85025 COMPLETE CBC W/AUTO DIFF WBC: CPT | Performed by: STUDENT IN AN ORGANIZED HEALTH CARE EDUCATION/TRAINING PROGRAM

## 2019-10-12 RX ORDER — KETOROLAC TROMETHAMINE 30 MG/ML
15 INJECTION, SOLUTION INTRAMUSCULAR; INTRAVENOUS ONCE
Status: COMPLETED | OUTPATIENT
Start: 2019-10-12 | End: 2019-10-12

## 2019-10-13 ENCOUNTER — APPOINTMENT (OUTPATIENT)
Dept: CT IMAGING | Facility: HOSPITAL | Age: 34
End: 2019-10-13
Attending: STUDENT IN AN ORGANIZED HEALTH CARE EDUCATION/TRAINING PROGRAM
Payer: COMMERCIAL

## 2019-10-13 VITALS
HEART RATE: 79 BPM | BODY MASS INDEX: 41.14 KG/M2 | WEIGHT: 256 LBS | TEMPERATURE: 98 F | DIASTOLIC BLOOD PRESSURE: 71 MMHG | RESPIRATION RATE: 16 BRPM | OXYGEN SATURATION: 98 % | HEIGHT: 66 IN | SYSTOLIC BLOOD PRESSURE: 125 MMHG

## 2019-10-13 PROCEDURE — 74177 CT ABD & PELVIS W/CONTRAST: CPT | Performed by: STUDENT IN AN ORGANIZED HEALTH CARE EDUCATION/TRAINING PROGRAM

## 2019-10-13 RX ORDER — METRONIDAZOLE 500 MG/1
500 TABLET ORAL 3 TIMES DAILY
Qty: 21 TABLET | Refills: 0 | Status: SHIPPED | OUTPATIENT
Start: 2019-10-13 | End: 2019-10-20

## 2019-10-13 RX ORDER — HYDROCODONE BITARTRATE AND ACETAMINOPHEN 5; 325 MG/1; MG/1
1-2 TABLET ORAL EVERY 6 HOURS PRN
Qty: 10 TABLET | Refills: 0 | Status: SHIPPED | OUTPATIENT
Start: 2019-10-13 | End: 2019-10-20

## 2019-10-13 RX ORDER — ONDANSETRON 4 MG/1
4 TABLET, ORALLY DISINTEGRATING ORAL EVERY 4 HOURS PRN
Qty: 10 TABLET | Refills: 0 | Status: SHIPPED | OUTPATIENT
Start: 2019-10-13 | End: 2019-10-20

## 2019-10-13 RX ORDER — CIPROFLOXACIN 500 MG/1
500 TABLET, FILM COATED ORAL 2 TIMES DAILY
Qty: 14 TABLET | Refills: 0 | Status: SHIPPED | OUTPATIENT
Start: 2019-10-13 | End: 2019-10-20

## 2019-10-13 RX ORDER — DICYCLOMINE HCL 20 MG
20 TABLET ORAL 4 TIMES DAILY PRN
Qty: 30 TABLET | Refills: 0 | Status: SHIPPED | OUTPATIENT
Start: 2019-10-13 | End: 2019-11-12

## 2019-10-13 NOTE — ED NOTES
Patient returned to room from [address] [facility], procedure well tolerated.

## 2019-10-13 NOTE — ED INITIAL ASSESSMENT (HPI)
Left lower quadrant abdominal pain that began two weeks ago but has become steady and more painful since this morning. Denies diarrhea. Denies urinary symptoms. Denies bleeding.

## 2019-10-13 NOTE — ED PROVIDER NOTES
Patient Seen in: [facility] Emergency Department      History   Patient presents with:  Abdominal Pain    Stated Complaint: LLQ pain    HPI    Patient is a 27-year-old female who presents the emergency department reporting low left-sided abdominal p Pulse 79   Temp 98.1 °F (36.7 °C) (Oral)   Resp 16   Ht 167.6 cm (5' 6\")   Wt 116.1 kg   SpO2 98%   BMI 41.32 kg/m²         Physical Exam    Constitutional: oriented to person, place, and time, appears well-developed and well-nourished.    HENT:   Head: No for these tests on the individual orders. TYPE AND SCREEN    Narrative: The following orders were created for panel order TYPE AND SCREEN.   Procedure                               Abnormality         Status                     --------- am    Follow-up:  Cecilia Blunt MD  [address]  [address] [address]  [phone]    In 1 day      Francesco Fuentes MD  [address] [place] [phone]    Schedule an appointment as soon as possible for a visit          Medic

## 2019-10-19 RX ORDER — BUPROPION HYDROCHLORIDE 150 MG/1
TABLET ORAL
Qty: 90 TABLET | Refills: 1 | Status: SHIPPED | OUTPATIENT
Start: 2019-10-19 | End: 2020-01-14 | Stop reason: ALTCHOICE

## 2019-10-19 NOTE — TELEPHONE ENCOUNTER
pls advise, thanks in advance.        Refill Protocol Appointment Criteria  · Appointment scheduled in the past 6 months or in the next 3 months  Recent Outpatient Visits            3 weeks ago Stress at home    [facility], [facility], [address] [facility] [address]

## 2019-10-29 ENCOUNTER — PATIENT MESSAGE (OUTPATIENT)
Dept: FAMILY MEDICINE CLINIC | Facility: CLINIC | Age: 34
End: 2019-10-29

## 2019-10-31 NOTE — TELEPHONE ENCOUNTER
From: Lana Olivares  To: Ester Aguilera MD  Sent: 10/29/2019 7:17 PM CDT  Subject: Other    Hi. I need proof of my flu vaccine administration faxed over to my job at [employer] please.  They contacted me late this afternoon asking for more information to maribel

## 2019-12-18 ENCOUNTER — OFFICE VISIT (OUTPATIENT)
Dept: NEUROLOGY | Facility: CLINIC | Age: 34
End: 2019-12-18
Payer: COMMERCIAL

## 2019-12-18 VITALS
DIASTOLIC BLOOD PRESSURE: 66 MMHG | SYSTOLIC BLOOD PRESSURE: 110 MMHG | RESPIRATION RATE: 20 BRPM | WEIGHT: 255 LBS | HEART RATE: 78 BPM | HEIGHT: 66 IN | BODY MASS INDEX: 40.98 KG/M2

## 2019-12-18 DIAGNOSIS — R20.0 NUMBNESS AND TINGLING OF BOTH UPPER EXTREMITIES: ICD-10-CM

## 2019-12-18 DIAGNOSIS — G43.009 MIGRAINE WITHOUT AURA AND WITHOUT STATUS MIGRAINOSUS, NOT INTRACTABLE: ICD-10-CM

## 2019-12-18 DIAGNOSIS — D32.9 MENINGIOMA (HCC): ICD-10-CM

## 2019-12-18 DIAGNOSIS — R42 VERTIGO: Primary | ICD-10-CM

## 2019-12-18 DIAGNOSIS — R20.2 NUMBNESS AND TINGLING OF BOTH UPPER EXTREMITIES: ICD-10-CM

## 2019-12-18 PROCEDURE — 99243 OFF/OP CNSLTJ NEW/EST LOW 30: CPT | Performed by: OTHER

## 2019-12-18 NOTE — PATIENT INSTRUCTIONS
Understanding Carpal Tunnel Syndrome    The carpal tunnel is a narrow space inside the wrist. It is ringed by bone and a band of tough tissue called the transverse carpal ligament.  A major nerve called the median nerve runs from the forearm into the hand · Cortisone shots. Cortisone is a medicine that helps reduce swelling. It is injected directly into the wrist. It helps shrink tissues inside the carpal tunnel. This relieves symptoms for a time. · Pain medicines.  You may take over-the-counter or prescrip Keep a straight (neutral) wrist position as often as you can. Don’t use your wrist in a bent (flexed) position for long periods of time. This includes extended or twisted positions.   When you sleep, don't have your wrist flexed (don't sleep all curled up o

## 2019-12-25 NOTE — PROGRESS NOTES
Neurology Outpatient Consult Note    Marylen Neigh : 1985   Referring Physician: Dr. Tonya Walker  HPI:     Marylen Neigh is a 27year old female who is being seen in neurologic evaluation. Patient being seen in evaluation for several concerns. latent    • Urinary incontinence     stress incontinence   • Viral meningitis 2015      Past Surgical History:   Procedure Laterality Date   • D & C  2011   • EYE MUSCLE SURG PROC UNLISTED Bilateral    • LAPAROSCOPIC CHOLECYSTECTOMY  03/15/16   • LASIK  20 distress, pleasant and cooperative   CV: regular rate and rhythm   Lungs: clear to auscultation bilaterally  Extremities: Negative Phalen maneuver bilaterally  HEENT/neck: Negative Lhermitte sign, no red desaturation  Neuro:  Mental Status: alert, speech f does have incidentally noted meningioma on MRI. Work-up thus far and neurologic exam reassuring.       –I discussed my clinical impressions and recommendations at length with patient in lay terms and addressed her questions and concerns    –We discussed li

## 2019-12-28 ENCOUNTER — HOSPITAL ENCOUNTER (OUTPATIENT)
Age: 34
Discharge: HOME | End: 2019-12-28
Attending: EMERGENCY MEDICINE
Payer: COMMERCIAL

## 2019-12-28 VITALS
DIASTOLIC BLOOD PRESSURE: 83 MMHG | SYSTOLIC BLOOD PRESSURE: 133 MMHG | RESPIRATION RATE: 16 BRPM | BODY MASS INDEX: 41 KG/M2 | OXYGEN SATURATION: 100 % | TEMPERATURE: 99 F | WEIGHT: 255 LBS | HEART RATE: 84 BPM

## 2019-12-28 DIAGNOSIS — R10.2 SUPRAPUBIC ABDOMINAL PAIN: Primary | ICD-10-CM

## 2019-12-28 LAB
B-HCG UR QL: NEGATIVE
BILIRUB UR QL STRIP: NEGATIVE
CLARITY UR: CLEAR
COLOR UR: YELLOW
GLUCOSE UR STRIP-MCNC: NEGATIVE MG/DL
HGB UR QL STRIP: NEGATIVE
KETONES UR STRIP-MCNC: NEGATIVE MG/DL
LEUKOCYTE ESTERASE UR QL STRIP: NEGATIVE
NITRITE UR QL STRIP: NEGATIVE
PH UR STRIP: 6 [PH]
PROT UR STRIP-MCNC: NEGATIVE MG/DL
SP GR UR STRIP: 1.02
UROBILINOGEN UR STRIP-ACNC: <2 MG/DL

## 2019-12-28 PROCEDURE — 99212 OFFICE O/P EST SF 10 MIN: CPT

## 2019-12-28 PROCEDURE — 81025 URINE PREGNANCY TEST: CPT

## 2019-12-28 PROCEDURE — 81002 URINALYSIS NONAUTO W/O SCOPE: CPT

## 2019-12-28 PROCEDURE — 99213 OFFICE O/P EST LOW 20 MIN: CPT

## 2019-12-28 NOTE — ED PROVIDER NOTES
Patient Seen in: [address]      History   Patient presents with:  Abdomen/Flank Pain    Stated Complaint: abdominal pain    HPI    The patient is a 69-year-old female who presents with suprapubic abdominal pain since ye above.    Physical Exam     ED Triage Vitals [12/28/19 1341]   /79   Pulse 90   Resp 16   Temp 98.8 °F (37.1 °C)   Temp src Oral   SpO2 100 %   O2 Device        Current:/79   Pulse 90   Temp 98.8 °F (37.1 °C) (Oral)   Resp 16   Wt 115.7 kg   Sp to try ibuprofen and if symptoms worsen go to emergency department for further testing. Patient comfortable with discharge plan.                 Disposition and Plan     Clinical Impression:  Suprapubic abdominal pain  (primary encounter diagnosis)    Disp

## 2019-12-28 NOTE — ED INITIAL ASSESSMENT (HPI)
Patient reports moderate to severe lower abdominal pain for a couple of days. Patient reports the pain is right over her bladder. Patient reports she had an episode of left sided pain a couple of months ago and was diagnosed with colitis.
Stable

## 2020-01-14 ENCOUNTER — OFFICE VISIT (OUTPATIENT)
Dept: FAMILY MEDICINE CLINIC | Facility: CLINIC | Age: 35
End: 2020-01-14
Payer: COMMERCIAL

## 2020-01-14 VITALS
WEIGHT: 269 LBS | SYSTOLIC BLOOD PRESSURE: 116 MMHG | TEMPERATURE: 98 F | HEIGHT: 66 IN | BODY MASS INDEX: 43.23 KG/M2 | HEART RATE: 86 BPM | DIASTOLIC BLOOD PRESSURE: 75 MMHG

## 2020-01-14 DIAGNOSIS — N32.89 BLADDER WALL THICKENING: ICD-10-CM

## 2020-01-14 DIAGNOSIS — R10.2 SUPRAPUBIC PAIN: Primary | ICD-10-CM

## 2020-01-14 DIAGNOSIS — F43.23 ADJUSTMENT REACTION WITH ANXIETY AND DEPRESSION: ICD-10-CM

## 2020-01-14 LAB
APPEARANCE: CLEAR
BILIRUB UR QL: NEGATIVE
CLARITY UR: CLEAR
COLOR UR: YELLOW
GLUCOSE (URINE DIPSTICK): NEGATIVE MG/DL
GLUCOSE UR-MCNC: NEGATIVE MG/DL
HGB UR QL STRIP.AUTO: NEGATIVE
KETONES (URINE DIPSTICK): NEGATIVE MG/DL
KETONES UR-MCNC: NEGATIVE MG/DL
LEUKOCYTE ESTERASE UR QL STRIP.AUTO: NEGATIVE
LEUKOCYTES: NEGATIVE
MULTISTIX LOT#: ABNORMAL NUMERIC
NITRITE UR QL STRIP.AUTO: NEGATIVE
NITRITE, URINE: NEGATIVE
PH UR: 6 [PH] (ref 5–8)
PH, URINE: 5 (ref 4.5–8)
PROT UR-MCNC: NEGATIVE MG/DL
PROTEIN (URINE DIPSTICK): NEGATIVE MG/DL
SP GR UR STRIP: 1.03 (ref 1–1.03)
SPECIFIC GRAVITY: 1.2 (ref 1–1.03)
URINE-COLOR: YELLOW
UROBILINOGEN UR STRIP-ACNC: <2
UROBILINOGEN,SEMI-QN: 0.2 MG/DL (ref 0–1.9)

## 2020-01-14 PROCEDURE — 81002 URINALYSIS NONAUTO W/O SCOPE: CPT | Performed by: FAMILY MEDICINE

## 2020-01-14 PROCEDURE — 99214 OFFICE O/P EST MOD 30 MIN: CPT | Performed by: FAMILY MEDICINE

## 2020-01-14 RX ORDER — ESCITALOPRAM OXALATE 10 MG/1
10 TABLET ORAL DAILY
Qty: 90 TABLET | Refills: 0 | Status: SHIPPED | OUTPATIENT
Start: 2020-01-14 | End: 2020-04-11

## 2020-01-14 NOTE — PROGRESS NOTES
HPI:    Patient ID: Colten Greene is a 27year old female.     HPI  Patient presents with:  Urgent Care F/u: went to [facility] on 12-28-19 for Suprapubic abdominal pain pt still feels the pain     Went 12/28  uirne dip and preg test neg  Stressful,  just c feels the pain      Physical Exam   Constitutional: She appears well-developed and well-nourished. Cardiovascular: Normal rate, regular rhythm and normal heart sounds. Pulmonary/Chest: Effort normal and breath sounds normal.   Abdominal: Soft.  Bowel so

## 2020-01-22 ENCOUNTER — HOSPITAL ENCOUNTER (OUTPATIENT)
Dept: ULTRASOUND IMAGING | Age: 35
Discharge: HOME | End: 2020-01-22
Attending: FAMILY MEDICINE
Payer: COMMERCIAL

## 2020-01-22 DIAGNOSIS — N32.89 BLADDER WALL THICKENING: ICD-10-CM

## 2020-01-22 DIAGNOSIS — R10.2 SUPRAPUBIC PAIN: ICD-10-CM

## 2020-01-22 PROCEDURE — 76770 US EXAM ABDO BACK WALL COMP: CPT | Performed by: FAMILY MEDICINE

## 2020-04-11 DIAGNOSIS — F43.23 ADJUSTMENT REACTION WITH ANXIETY AND DEPRESSION: ICD-10-CM

## 2020-04-11 RX ORDER — ESCITALOPRAM OXALATE 10 MG/1
TABLET ORAL
Qty: 90 TABLET | Refills: 0 | Status: SHIPPED | OUTPATIENT
Start: 2020-04-11 | End: 2020-07-07

## 2020-05-08 ENCOUNTER — TELEPHONE (OUTPATIENT)
Dept: INTERNAL MEDICINE CLINIC | Facility: HOSPITAL | Age: 35
End: 2020-05-08

## 2020-05-08 DIAGNOSIS — Z20.822 SUSPECTED 2019 NOVEL CORONAVIRUS INFECTION: Primary | ICD-10-CM

## 2020-05-11 ENCOUNTER — LAB ENCOUNTER (OUTPATIENT)
Dept: LAB | Facility: HOSPITAL | Age: 35
End: 2020-05-11
Attending: PREVENTIVE MEDICINE
Payer: COMMERCIAL

## 2020-05-11 DIAGNOSIS — Z20.822 SUSPECTED 2019 NOVEL CORONAVIRUS INFECTION: ICD-10-CM

## 2020-07-06 DIAGNOSIS — F43.23 ADJUSTMENT REACTION WITH ANXIETY AND DEPRESSION: ICD-10-CM

## 2020-07-07 RX ORDER — ESCITALOPRAM OXALATE 10 MG/1
TABLET ORAL
Qty: 90 TABLET | Refills: 0 | Status: SHIPPED | OUTPATIENT
Start: 2020-07-07 | End: 2021-01-04

## 2020-07-16 ENCOUNTER — PATIENT MESSAGE (OUTPATIENT)
Dept: FAMILY MEDICINE CLINIC | Facility: CLINIC | Age: 35
End: 2020-07-16

## 2020-07-17 NOTE — TELEPHONE ENCOUNTER
From: Harshal Skinner  To: Sage Porter MD  Sent: 7/16/2020 10:27 PM CDT  Subject: Other    Which endocrine MD will you refer me to?

## 2020-11-18 ENCOUNTER — OFFICE VISIT (OUTPATIENT)
Dept: ENDOCRINOLOGY CLINIC | Facility: CLINIC | Age: 35
End: 2020-11-18
Payer: COMMERCIAL

## 2020-11-18 VITALS
HEIGHT: 66 IN | DIASTOLIC BLOOD PRESSURE: 82 MMHG | BODY MASS INDEX: 44.2 KG/M2 | WEIGHT: 275 LBS | RESPIRATION RATE: 20 BRPM | SYSTOLIC BLOOD PRESSURE: 120 MMHG | HEART RATE: 78 BPM

## 2020-11-18 DIAGNOSIS — Z13.1 DIABETES MELLITUS SCREENING: ICD-10-CM

## 2020-11-18 DIAGNOSIS — Z13.9 SCREENING FOR CONDITION: ICD-10-CM

## 2020-11-18 DIAGNOSIS — E66.9 OBESITY, UNSPECIFIED CLASSIFICATION, UNSPECIFIED OBESITY TYPE, UNSPECIFIED WHETHER SERIOUS COMORBIDITY PRESENT: Primary | ICD-10-CM

## 2020-11-18 PROCEDURE — 99243 OFF/OP CNSLTJ NEW/EST LOW 30: CPT | Performed by: INTERNAL MEDICINE

## 2020-11-18 PROCEDURE — 3008F BODY MASS INDEX DOCD: CPT | Performed by: INTERNAL MEDICINE

## 2020-11-18 PROCEDURE — 3079F DIAST BP 80-89 MM HG: CPT | Performed by: INTERNAL MEDICINE

## 2020-11-18 PROCEDURE — 81025 URINE PREGNANCY TEST: CPT | Performed by: INTERNAL MEDICINE

## 2020-11-18 PROCEDURE — 3074F SYST BP LT 130 MM HG: CPT | Performed by: INTERNAL MEDICINE

## 2020-11-18 PROCEDURE — 99072 ADDL SUPL MATRL&STAF TM PHE: CPT | Performed by: INTERNAL MEDICINE

## 2020-11-18 RX ORDER — DEXAMETHASONE 1 MG
1 TABLET ORAL ONCE
Qty: 1 TABLET | Refills: 0 | Status: SHIPPED | OUTPATIENT
Start: 2020-11-18 | End: 2020-11-18

## 2020-11-18 RX ORDER — TOPIRAMATE 50 MG/1
50 TABLET, FILM COATED ORAL DAILY
Qty: 90 TABLET | Refills: 0 | Status: SHIPPED | OUTPATIENT
Start: 2020-11-18 | End: 2021-02-22

## 2020-11-18 RX ORDER — PHENTERMINE HYDROCHLORIDE 15 MG/1
15 CAPSULE ORAL EVERY MORNING
Qty: 30 CAPSULE | Refills: 0 | Status: SHIPPED | OUTPATIENT
Start: 2020-11-18 | End: 2020-12-22

## 2020-11-18 NOTE — H&P
New Patient Evaluation - History and Physical    CONSULT - Reason for Visit : weight gain   Requesting Physician: Dr. Perla Varela:  Patient presents with:  Consult: Consult for weight issues. PCP referred.  Pt reports a lot of stress lately and Not on file      Number of children: 1      Years of education: Not on file      Highest education level: Not on file    Occupational History      Occupation: RN Oncology        Comment: [employer]    Tobacco Use      Smoking status: Former [address] no extreme weight gain or loss  Head: Atraumatic  Eyes:  normal conjunctivae, sclera. , normal sclera and normal pupils  Throat/Neck: normal sound to voice.  Normal hearing, normal speech  Back: no kyphosis  Respiratory:  Speaking in full sentences, non-labo clinic    Will get a DST to rule out cortisol excess  TSH has been normal  a1c 5.4 % in 2019, will recheck    RTC in three months        Orders Placed This Encounter      Cortisol        11/18/2020  Laurelyn Spatz, MD        Patient verbalized a complete

## 2020-11-23 ENCOUNTER — LAB ENCOUNTER (OUTPATIENT)
Dept: LAB | Facility: HOSPITAL | Age: 35
End: 2020-11-23
Attending: INTERNAL MEDICINE
Payer: COMMERCIAL

## 2020-11-23 DIAGNOSIS — Z13.1 DIABETES MELLITUS SCREENING: ICD-10-CM

## 2020-11-23 DIAGNOSIS — E66.9 OBESITY, UNSPECIFIED CLASSIFICATION, UNSPECIFIED OBESITY TYPE, UNSPECIFIED WHETHER SERIOUS COMORBIDITY PRESENT: ICD-10-CM

## 2020-11-23 PROCEDURE — 82533 TOTAL CORTISOL: CPT

## 2020-11-23 PROCEDURE — 83036 HEMOGLOBIN GLYCOSYLATED A1C: CPT

## 2020-11-23 PROCEDURE — 36415 COLL VENOUS BLD VENIPUNCTURE: CPT

## 2020-12-20 ENCOUNTER — IMMUNIZATION (OUTPATIENT)
Dept: LAB | Facility: HOSPITAL | Age: 35
End: 2020-12-20
Attending: PREVENTIVE MEDICINE
Payer: COMMERCIAL

## 2020-12-20 DIAGNOSIS — Z23 NEED FOR VACCINATION: ICD-10-CM

## 2020-12-20 PROCEDURE — 0001A PFIZER-BIONTECH COVID-19 VACCINE: CPT | Performed by: NURSE PRACTITIONER

## 2020-12-23 RX ORDER — PHENTERMINE HYDROCHLORIDE 15 MG/1
15 CAPSULE ORAL EVERY MORNING
Qty: 30 CAPSULE | Refills: 0 | Status: SHIPPED | OUTPATIENT
Start: 2020-12-23 | End: 2021-02-04

## 2021-01-04 DIAGNOSIS — F43.23 ADJUSTMENT REACTION WITH ANXIETY AND DEPRESSION: ICD-10-CM

## 2021-01-04 RX ORDER — ESCITALOPRAM OXALATE 10 MG/1
10 TABLET ORAL DAILY
Qty: 90 TABLET | Refills: 1 | Status: SHIPPED | OUTPATIENT
Start: 2021-01-04 | End: 2021-01-06

## 2021-01-06 DIAGNOSIS — F43.23 ADJUSTMENT REACTION WITH ANXIETY AND DEPRESSION: ICD-10-CM

## 2021-01-06 RX ORDER — ESCITALOPRAM OXALATE 10 MG/1
TABLET ORAL
Qty: 90 TABLET | Refills: 1 | Status: SHIPPED | OUTPATIENT
Start: 2021-01-06 | End: 2021-06-17

## 2021-01-10 ENCOUNTER — IMMUNIZATION (OUTPATIENT)
Dept: LAB | Facility: HOSPITAL | Age: 36
End: 2021-01-10
Attending: PREVENTIVE MEDICINE

## 2021-01-10 DIAGNOSIS — Z23 NEED FOR VACCINATION: ICD-10-CM

## 2021-01-10 PROCEDURE — 0002A SARSCOV2 VAC 30MCG/0.3ML IM: CPT

## 2021-02-04 ENCOUNTER — TELEPHONE (OUTPATIENT)
Dept: ENDOCRINOLOGY CLINIC | Facility: CLINIC | Age: 36
End: 2021-02-04

## 2021-02-04 RX ORDER — PHENTERMINE HYDROCHLORIDE 15 MG/1
15 CAPSULE ORAL EVERY MORNING
Qty: 30 CAPSULE | Refills: 0 | Status: SHIPPED | OUTPATIENT
Start: 2021-02-04 | End: 2021-02-22

## 2021-02-04 NOTE — TELEPHONE ENCOUNTER
•  Phentermine HCl 15 MG Oral Cap, Take 1 capsule (15 mg total) by mouth every morning., Disp: 30 capsule, Rfl: 0

## 2021-02-22 ENCOUNTER — OFFICE VISIT (OUTPATIENT)
Dept: ENDOCRINOLOGY CLINIC | Facility: CLINIC | Age: 36
End: 2021-02-22
Payer: COMMERCIAL

## 2021-02-22 VITALS
WEIGHT: 258 LBS | DIASTOLIC BLOOD PRESSURE: 83 MMHG | BODY MASS INDEX: 42 KG/M2 | HEART RATE: 84 BPM | SYSTOLIC BLOOD PRESSURE: 120 MMHG

## 2021-02-22 DIAGNOSIS — E66.01 CLASS 3 SEVERE OBESITY WITH BODY MASS INDEX (BMI) OF 40.0 TO 44.9 IN ADULT, UNSPECIFIED OBESITY TYPE, UNSPECIFIED WHETHER SERIOUS COMORBIDITY PRESENT (HCC): Primary | ICD-10-CM

## 2021-02-22 PROCEDURE — 3079F DIAST BP 80-89 MM HG: CPT | Performed by: INTERNAL MEDICINE

## 2021-02-22 PROCEDURE — 99213 OFFICE O/P EST LOW 20 MIN: CPT | Performed by: INTERNAL MEDICINE

## 2021-02-22 PROCEDURE — 3074F SYST BP LT 130 MM HG: CPT | Performed by: INTERNAL MEDICINE

## 2021-02-22 RX ORDER — PHENTERMINE HYDROCHLORIDE 30 MG/1
30 CAPSULE ORAL EVERY MORNING
Qty: 90 CAPSULE | Refills: 0 | Status: SHIPPED | OUTPATIENT
Start: 2021-02-22 | End: 2021-05-19

## 2021-02-22 RX ORDER — TOPIRAMATE 50 MG/1
50 TABLET, FILM COATED ORAL DAILY
Qty: 90 TABLET | Refills: 0 | Status: SHIPPED | OUTPATIENT
Start: 2021-02-22 | End: 2021-05-19

## 2021-02-22 NOTE — PROGRESS NOTES
Return Office Visit     CHIEF COMPLAINT:  Patient presents with:   Follow - Up: Pt is present to follow up with medication      BMI 41.6    HISTORY OF PRESENT ILLNESS:  Domenica Fagan is a 38year old female who presents for follow up for high BMI    She rep extremity edema  Endocrine: Negative for: polyuria, polydypsia  Skin: Negative for: rash, blister, cellulitis,       PHYSICAL EXAM:    02/22/21  0943   BP: 120/83   Pulse: 84   Weight: 258 lb (117 kg)     BMI: Body mass index is 41.64 kg/m².        General

## 2021-05-19 RX ORDER — TOPIRAMATE 50 MG/1
TABLET, FILM COATED ORAL
Qty: 90 TABLET | Refills: 0 | Status: SHIPPED | OUTPATIENT
Start: 2021-05-19 | End: 2021-08-16

## 2021-05-19 RX ORDER — PHENTERMINE HYDROCHLORIDE 30 MG/1
CAPSULE ORAL
Qty: 90 CAPSULE | Refills: 0 | Status: SHIPPED | OUTPATIENT
Start: 2021-05-19 | End: 2021-05-24

## 2021-05-24 ENCOUNTER — TELEPHONE (OUTPATIENT)
Dept: ENDOCRINOLOGY CLINIC | Facility: CLINIC | Age: 36
End: 2021-05-24

## 2021-05-24 ENCOUNTER — OFFICE VISIT (OUTPATIENT)
Dept: ENDOCRINOLOGY CLINIC | Facility: CLINIC | Age: 36
End: 2021-05-24
Payer: COMMERCIAL

## 2021-05-24 VITALS
WEIGHT: 239 LBS | HEART RATE: 96 BPM | SYSTOLIC BLOOD PRESSURE: 134 MMHG | DIASTOLIC BLOOD PRESSURE: 90 MMHG | BODY MASS INDEX: 39 KG/M2

## 2021-05-24 DIAGNOSIS — E66.9 OBESITY, UNSPECIFIED CLASSIFICATION, UNSPECIFIED OBESITY TYPE, UNSPECIFIED WHETHER SERIOUS COMORBIDITY PRESENT: Primary | ICD-10-CM

## 2021-05-24 PROCEDURE — 3075F SYST BP GE 130 - 139MM HG: CPT | Performed by: INTERNAL MEDICINE

## 2021-05-24 PROCEDURE — 99213 OFFICE O/P EST LOW 20 MIN: CPT | Performed by: INTERNAL MEDICINE

## 2021-05-24 PROCEDURE — 3080F DIAST BP >= 90 MM HG: CPT | Performed by: INTERNAL MEDICINE

## 2021-05-24 RX ORDER — PHENTERMINE HYDROCHLORIDE 37.5 MG/1
37.5 CAPSULE ORAL EVERY MORNING
Qty: 90 CAPSULE | Refills: 0 | Status: SHIPPED | OUTPATIENT
Start: 2021-05-24 | End: 2021-08-16

## 2021-05-24 NOTE — PROGRESS NOTES
Return Office Visit     CHIEF COMPLAINT:    Obesity       HISTORY OF PRESENT ILLNESS:  Harshal Skinner is a 38year old female who presents for follow up for high BMI      She was started on phetermine-topiramate and has lost about 40 pounds     She walks on PHYSICAL EXAM:    05/24/21  0921   BP: 134/90   Pulse: 96   Weight: 239 lb (108.4 kg)     BMI: Body mass index is 38.58 kg/m².        General Appearance:  alert, well developed, in no acute distress  Head: Atraumatic  Eyes:  normal conjunctivae, scler

## 2021-05-24 NOTE — TELEPHONE ENCOUNTER
Pharmacy called to clarify dosage on RX:        Current Outpatient Medications:   •  Phentermine HCl 37.5 MG Oral Cap, Take 1 capsule (37.5 mg total) by mouth every morning., Disp: 90 capsule, Rfl: 0

## 2021-05-25 NOTE — TELEPHONE ENCOUNTER
Spoke to pharmacist to clarify that Dr. Alessio Rolon increased patient's dose of phentermine from 30 to 37.5mg daily (per LOV dtd 5/24/21)

## 2021-06-17 DIAGNOSIS — F43.23 ADJUSTMENT REACTION WITH ANXIETY AND DEPRESSION: ICD-10-CM

## 2021-06-17 NOTE — TELEPHONE ENCOUNTER
Spoke with pt,  verified, pt req rx ref for lexapro 10 mg # 90 days and gen wellbutrin  mg every day, pls send to [email or web address] in [place]. Pt will schedule appt soon with Dr Kem Evans.   pls advise, thanks in advance.        Requested Prescriptions

## 2021-06-19 RX ORDER — ESCITALOPRAM OXALATE 10 MG/1
10 TABLET ORAL DAILY
Qty: 90 TABLET | Refills: 0 | Status: SHIPPED | OUTPATIENT
Start: 2021-06-19 | End: 2021-06-28

## 2021-06-19 RX ORDER — BUPROPION HYDROCHLORIDE 150 MG/1
150 TABLET ORAL DAILY
Qty: 90 TABLET | Refills: 0 | Status: SHIPPED | OUTPATIENT
Start: 2021-06-19 | End: 2021-08-16

## 2021-06-28 ENCOUNTER — OFFICE VISIT (OUTPATIENT)
Dept: FAMILY MEDICINE CLINIC | Facility: CLINIC | Age: 36
End: 2021-06-28
Payer: COMMERCIAL

## 2021-06-28 VITALS
WEIGHT: 233 LBS | DIASTOLIC BLOOD PRESSURE: 81 MMHG | HEIGHT: 66 IN | TEMPERATURE: 97 F | BODY MASS INDEX: 37.45 KG/M2 | HEART RATE: 99 BPM | SYSTOLIC BLOOD PRESSURE: 119 MMHG

## 2021-06-28 DIAGNOSIS — Z00.00 WELL ADULT EXAM: Primary | ICD-10-CM

## 2021-06-28 DIAGNOSIS — Z13.1 SCREENING FOR DIABETES MELLITUS: ICD-10-CM

## 2021-06-28 DIAGNOSIS — Z63.5 MARITAL CONFLICT INVOLVING DIVORCE: ICD-10-CM

## 2021-06-28 DIAGNOSIS — F43.23 ADJUSTMENT REACTION WITH ANXIETY AND DEPRESSION: ICD-10-CM

## 2021-06-28 DIAGNOSIS — Z97.5 IUD CONTRACEPTION: ICD-10-CM

## 2021-06-28 DIAGNOSIS — Z13.6 ENCOUNTER FOR SCREENING FOR CARDIOVASCULAR DISORDERS: ICD-10-CM

## 2021-06-28 DIAGNOSIS — D32.9 MENINGIOMA (HCC): ICD-10-CM

## 2021-06-28 DIAGNOSIS — Z63.4 SUDDEN DEATH OF FAMILY MEMBER: ICD-10-CM

## 2021-06-28 DIAGNOSIS — F43.21 COMPLICATED GRIEF: ICD-10-CM

## 2021-06-28 DIAGNOSIS — E66.9 OBESITY (BMI 30-39.9): ICD-10-CM

## 2021-06-28 PROBLEM — Z34.90 NORMAL OBSTETRIC ULTRASOUND SCAN, ANTEPARTUM (HCC): Status: RESOLVED | Noted: 2018-05-14 | Resolved: 2021-06-28

## 2021-06-28 PROBLEM — O36.8190 DECREASED FETAL MOVEMENT (HCC): Status: RESOLVED | Noted: 2017-05-11 | Resolved: 2021-06-28

## 2021-06-28 PROBLEM — O99.210 OBESITY IN PREGNANCY: Status: RESOLVED | Noted: 2017-12-08 | Resolved: 2021-06-28

## 2021-06-28 PROBLEM — Z87.59 HISTORY OF PRECIPITOUS LABOR AND DELIVERY: Status: RESOLVED | Noted: 2018-04-30 | Resolved: 2021-06-28

## 2021-06-28 PROBLEM — R51.9 RIGHT-SIDED HEADACHE: Status: RESOLVED | Noted: 2019-09-23 | Resolved: 2021-06-28

## 2021-06-28 PROBLEM — E66.01 MORBID OBESITY WITH BMI OF 40.0-44.9, ADULT (HCC): Status: RESOLVED | Noted: 2017-02-27 | Resolved: 2021-06-28

## 2021-06-28 PROBLEM — Z34.90 NORMAL OBSTETRIC ULTRASOUND SCAN, ANTEPARTUM: Status: RESOLVED | Noted: 2018-05-14 | Resolved: 2021-06-28

## 2021-06-28 PROBLEM — O47.9 IRREGULAR CONTRACTIONS: Status: RESOLVED | Noted: 2017-05-22 | Resolved: 2021-06-28

## 2021-06-28 PROBLEM — O99.210 OBESITY IN PREGNANCY (HCC): Status: RESOLVED | Noted: 2017-12-08 | Resolved: 2021-06-28

## 2021-06-28 PROBLEM — Z34.90 PREGNANCY: Status: RESOLVED | Noted: 2017-05-27 | Resolved: 2021-06-28

## 2021-06-28 PROBLEM — Z34.90 PREGNANCY (HCC): Status: RESOLVED | Noted: 2017-05-27 | Resolved: 2021-06-28

## 2021-06-28 PROBLEM — O47.9 IRREGULAR CONTRACTIONS (HCC): Status: RESOLVED | Noted: 2017-05-22 | Resolved: 2021-06-28

## 2021-06-28 PROBLEM — O36.8190 DECREASED FETAL MOVEMENT: Status: RESOLVED | Noted: 2017-05-11 | Resolved: 2021-06-28

## 2021-06-28 PROCEDURE — 3074F SYST BP LT 130 MM HG: CPT | Performed by: FAMILY MEDICINE

## 2021-06-28 PROCEDURE — 3008F BODY MASS INDEX DOCD: CPT | Performed by: FAMILY MEDICINE

## 2021-06-28 PROCEDURE — 99395 PREV VISIT EST AGE 18-39: CPT | Performed by: FAMILY MEDICINE

## 2021-06-28 PROCEDURE — 3079F DIAST BP 80-89 MM HG: CPT | Performed by: FAMILY MEDICINE

## 2021-06-28 RX ORDER — ALPRAZOLAM 1 MG/1
1 TABLET ORAL 2 TIMES DAILY PRN
COMMUNITY
Start: 2021-02-22 | End: 2021-09-14

## 2021-06-28 SDOH — SOCIAL STABILITY - SOCIAL INSECURITY: DISSAPEARANCE AND DEATH OF FAMILY MEMBER: Z63.4

## 2021-06-28 SDOH — SOCIAL STABILITY - SOCIAL INSECURITY: DISRUPTION OF FAMILY BY SEPARATION AND DIVORCE: Z63.5

## 2021-06-28 NOTE — PROGRESS NOTES
HPI:    Patient ID: Phu Evans is a 38year old female.     HPI  Patient presents with:  Routine Physical: sees gyne     Wt Readings from Last 6 Encounters:  06/28/21 : 233 lb (105.7 kg)  05/24/21 : 239 lb (108.4 kg)  02/22/21 : 258 lb (117 kg)  11/18/20 urinating, dysuria, flank pain, frequency, menstrual problem, pelvic pain, urgency, vaginal bleeding, vaginal discharge and vaginal pain. IUD   Musculoskeletal: Negative for arthralgias, back pain and myalgias. Skin: Negative for rash.    Neurologi Sexual Activity      Alcohol use: No        Alcohol/week: 0.0 standard drinks        Comment: None.        Drug use: No      Sexual activity: Yes        Partners: Male        Comment: none    Other Topics      Concerns:         Service: Not Asked Date(s) Administered   • Covid-19 Vaccine Pfizer 30 mcg/0.3 ml 12/20/2020, 01/10/2021   • FLULAVAL 6 months & older 0.5 ml Prefilled syringe ([id number]) 09/23/2019   • Influenza 11/03/2008, 10/03/2017, 10/03/2018, 09/14/2020   • TDAP 06/23/2006, 07/31/2015, 03 Palpations: Abdomen is soft. There is no mass. Tenderness: There is no abdominal tenderness. Musculoskeletal:         General: No tenderness. Cervical back: Normal range of motion and neck supple.    Lymphadenopathy:      Cervical: No cervical for screening for cardiovascular disorders    - EKG 12-LEAD; Future    8. Screening for diabetes mellitus    - HEMOGLOBIN A1C; Future    9. IUD contraception    10.   Adjustment reaction with anxiety depression  Follow-up with psychiatry  Jose Khalil

## 2021-06-28 NOTE — PATIENT INSTRUCTIONS
Understanding Adjustment Disorders  Most people have stress in their lives, and sometimes you may have more than you can handle. You may find it hard to cope with a stressful event. As a result, you may become anxious and depressed.  You might even get si Andra last reviewed this educational content on 12/1/2019  © 6176-5920 [employer]. All rights reserved. This information is not intended as a substitute for professional medical care.  Always follow your healthcare professional's instruc longer have the job you had when you left. Your family may have moved into a new house. Your role in the household may also have changed, with your spouse taking on tasks that you used to handle.  You also have to get back into a daily routine that’s very d may also be prescribed to help with depression, anxiety, or sleep. Getting your life back  Your deployment was a life-changing experience. But there is a place for you here at home. Be patient with yourself. Getting used to major changes can take time.  D

## 2021-07-09 ENCOUNTER — LAB ENCOUNTER (OUTPATIENT)
Dept: LAB | Facility: HOSPITAL | Age: 36
End: 2021-07-09
Attending: FAMILY MEDICINE
Payer: COMMERCIAL

## 2021-07-09 DIAGNOSIS — Z00.00 WELL ADULT EXAM: ICD-10-CM

## 2021-07-09 DIAGNOSIS — Z13.6 ENCOUNTER FOR SCREENING FOR CARDIOVASCULAR DISORDERS: ICD-10-CM

## 2021-07-09 DIAGNOSIS — Z13.1 SCREENING FOR DIABETES MELLITUS: ICD-10-CM

## 2021-07-09 PROBLEM — F43.23 ADJUSTMENT REACTION WITH ANXIETY AND DEPRESSION: Status: RESOLVED | Noted: 2019-09-23 | Resolved: 2021-07-09

## 2021-07-09 LAB
ALBUMIN SERPL-MCNC: 3.9 G/DL (ref 3.4–5)
ALBUMIN/GLOB SERPL: 1.1 {RATIO} (ref 1–2)
ALP LIVER SERPL-CCNC: 98 U/L
ALT SERPL-CCNC: 32 U/L
ANION GAP SERPL CALC-SCNC: 8 MMOL/L (ref 0–18)
AST SERPL-CCNC: 22 U/L (ref 15–37)
BASOPHILS # BLD AUTO: 0.05 X10(3) UL (ref 0–0.2)
BASOPHILS NFR BLD AUTO: 0.6 %
BILIRUB SERPL-MCNC: 0.7 MG/DL (ref 0.1–2)
BUN BLD-MCNC: 7 MG/DL (ref 7–18)
BUN/CREAT SERPL: 8 (ref 10–20)
CALCIUM BLD-MCNC: 9 MG/DL (ref 8.5–10.1)
CHLORIDE SERPL-SCNC: 111 MMOL/L (ref 98–112)
CHOLEST SMN-MCNC: 157 MG/DL (ref ?–200)
CO2 SERPL-SCNC: 22 MMOL/L (ref 21–32)
CREAT BLD-MCNC: 0.87 MG/DL
DEPRECATED RDW RBC AUTO: 38.3 FL (ref 35.1–46.3)
EOSINOPHIL # BLD AUTO: 0.22 X10(3) UL (ref 0–0.7)
EOSINOPHIL NFR BLD AUTO: 2.7 %
ERYTHROCYTE [DISTWIDTH] IN BLOOD BY AUTOMATED COUNT: 12.3 % (ref 11–15)
EST. AVERAGE GLUCOSE BLD GHB EST-MCNC: 108 MG/DL (ref 68–126)
GLOBULIN PLAS-MCNC: 3.7 G/DL (ref 2.8–4.4)
GLUCOSE BLD-MCNC: 94 MG/DL (ref 70–99)
HBA1C MFR BLD HPLC: 5.4 % (ref ?–5.7)
HCT VFR BLD AUTO: 41.7 %
HDLC SERPL-MCNC: 39 MG/DL (ref 40–59)
HGB BLD-MCNC: 13.4 G/DL
IMM GRANULOCYTES # BLD AUTO: 0.02 X10(3) UL (ref 0–1)
IMM GRANULOCYTES NFR BLD: 0.2 %
LDLC SERPL CALC-MCNC: 98 MG/DL (ref ?–100)
LYMPHOCYTES # BLD AUTO: 3.01 X10(3) UL (ref 1–4)
LYMPHOCYTES NFR BLD AUTO: 36.3 %
M PROTEIN MFR SERPL ELPH: 7.6 G/DL (ref 6.4–8.2)
MCH RBC QN AUTO: 27.5 PG (ref 26–34)
MCHC RBC AUTO-ENTMCNC: 32.1 G/DL (ref 31–37)
MCV RBC AUTO: 85.5 FL
MONOCYTES # BLD AUTO: 0.46 X10(3) UL (ref 0.1–1)
MONOCYTES NFR BLD AUTO: 5.5 %
NEUTROPHILS # BLD AUTO: 4.53 X10 (3) UL (ref 1.5–7.7)
NEUTROPHILS # BLD AUTO: 4.53 X10(3) UL (ref 1.5–7.7)
NEUTROPHILS NFR BLD AUTO: 54.7 %
NONHDLC SERPL-MCNC: 118 MG/DL (ref ?–130)
OSMOLALITY SERPL CALC.SUM OF ELEC: 290 MOSM/KG (ref 275–295)
PATIENT FASTING Y/N/NP: YES
PATIENT FASTING Y/N/NP: YES
PLATELET # BLD AUTO: 418 10(3)UL (ref 150–450)
POTASSIUM SERPL-SCNC: 4.1 MMOL/L (ref 3.5–5.1)
RBC # BLD AUTO: 4.88 X10(6)UL
SODIUM SERPL-SCNC: 141 MMOL/L (ref 136–145)
TRIGL SERPL-MCNC: 107 MG/DL (ref 30–149)
TSI SER-ACNC: 1.35 MIU/ML (ref 0.36–3.74)
VLDLC SERPL CALC-MCNC: 18 MG/DL (ref 0–30)
WBC # BLD AUTO: 8.3 X10(3) UL (ref 4–11)

## 2021-07-09 PROCEDURE — 80061 LIPID PANEL: CPT

## 2021-07-09 PROCEDURE — 80053 COMPREHEN METABOLIC PANEL: CPT

## 2021-07-09 PROCEDURE — 83036 HEMOGLOBIN GLYCOSYLATED A1C: CPT

## 2021-07-09 PROCEDURE — 84443 ASSAY THYROID STIM HORMONE: CPT

## 2021-07-09 PROCEDURE — 85025 COMPLETE CBC W/AUTO DIFF WBC: CPT

## 2021-07-09 PROCEDURE — 36415 COLL VENOUS BLD VENIPUNCTURE: CPT

## 2021-07-09 PROCEDURE — 93010 ELECTROCARDIOGRAM REPORT: CPT | Performed by: FAMILY MEDICINE

## 2021-07-09 PROCEDURE — 93005 ELECTROCARDIOGRAM TRACING: CPT

## 2021-07-13 DIAGNOSIS — Z82.49 FAMILY HISTORY OF PREMATURE CAD: Primary | ICD-10-CM

## 2021-07-23 PROBLEM — F33.2 MDD (MAJOR DEPRESSIVE DISORDER), RECURRENT SEVERE, WITHOUT PSYCHOSIS (HCC): Status: ACTIVE | Noted: 2021-07-23

## 2021-07-23 PROBLEM — F43.10 PTSD (POST-TRAUMATIC STRESS DISORDER): Status: ACTIVE | Noted: 2021-07-23

## 2021-07-23 PROBLEM — F41.1 GAD (GENERALIZED ANXIETY DISORDER): Status: ACTIVE | Noted: 2021-07-23

## 2021-08-24 ENCOUNTER — OFFICE VISIT (OUTPATIENT)
Dept: ENDOCRINOLOGY CLINIC | Facility: CLINIC | Age: 36
End: 2021-08-24
Payer: COMMERCIAL

## 2021-08-24 VITALS
DIASTOLIC BLOOD PRESSURE: 90 MMHG | HEIGHT: 66 IN | WEIGHT: 233 LBS | BODY MASS INDEX: 37.45 KG/M2 | HEART RATE: 109 BPM | SYSTOLIC BLOOD PRESSURE: 128 MMHG

## 2021-08-24 PROCEDURE — 3008F BODY MASS INDEX DOCD: CPT | Performed by: INTERNAL MEDICINE

## 2021-08-24 PROCEDURE — 3074F SYST BP LT 130 MM HG: CPT | Performed by: INTERNAL MEDICINE

## 2021-08-24 PROCEDURE — 99213 OFFICE O/P EST LOW 20 MIN: CPT | Performed by: INTERNAL MEDICINE

## 2021-08-24 PROCEDURE — 3080F DIAST BP >= 90 MM HG: CPT | Performed by: INTERNAL MEDICINE

## 2021-08-24 NOTE — PROGRESS NOTES
Return Office Visit     CHIEF COMPLAINT:    BMI 37    HISTORY OF PRESENT ILLNESS:  Mile Jarquin is a 38year old female who presents for follow up for high BMI      She was started on phetermine-topiramate and has lost about 50 pounds   Recent stress at h frequency  Psychiatric: mood changes as above, stable  Hematology/Lymphatics: Negative for: bruising, lower extremity edema  Endocrine: Negative for: polyuria, polydypsia  Skin: Negative for: rash, blister, cellulitis,       PHYSICAL EXAM:    08/24/21  095 months        Ruben Martines MD

## 2021-08-27 ENCOUNTER — TELEPHONE (OUTPATIENT)
Dept: ENDOCRINOLOGY CLINIC | Facility: CLINIC | Age: 36
End: 2021-08-27

## 2021-08-27 ENCOUNTER — PATIENT MESSAGE (OUTPATIENT)
Dept: ENDOCRINOLOGY CLINIC | Facility: CLINIC | Age: 36
End: 2021-08-27

## 2021-08-27 RX ORDER — SEMAGLUTIDE 1.34 MG/ML
0.25 INJECTION, SOLUTION SUBCUTANEOUS WEEKLY
Qty: 1.5 ML | Refills: 2 | Status: SHIPPED | OUTPATIENT
Start: 2021-08-27 | End: 2021-11-04

## 2021-08-27 NOTE — TELEPHONE ENCOUNTER
Prior Authorization request for:    •  semaglutide-weight management 0.25 MG/0.5ML Subcutaneous Solution Auto-injector, Inject 0.5 mL (0.25 mg total) into the skin once a week., Disp: 4 each, Rfl: 0    KEY: [id number]

## 2021-08-27 NOTE — TELEPHONE ENCOUNTER
Medication PA Requested:  semaglutide-weight management 0.25 MG/0.5ML Subcutaneous Solution Auto-injector                                                        CoverMyMeds Used:  Key: [id number]  Quantity: Disp: 4 each, Rfl: 0  Day Supply: 90  Sig: Inject 0

## 2021-08-27 NOTE — TELEPHONE ENCOUNTER
From: Nereyda Brush  To: Dale Wilcox MD  Sent: 8/27/2021 9:32 AM CDT  Subject: Prescription Question    [facility] was not covered by insurance. Please let me know when ozempic get sent over to the pharmacy so I can follow up with them again. Thanks!

## 2021-08-30 NOTE — TELEPHONE ENCOUNTER
Please see 8/27/21 [employer] message. Prescription was changed to [address] to see if Ozempic also requires PA and was informed that it does not need a PA. Endo staff, please let us know if you would still like PA for [facility].  Than

## 2021-08-30 NOTE — TELEPHONE ENCOUNTER
Noted below. Patient notified via [employer] that Vipul Low was sent to pharmacy and requires no PA. Awaiting response.

## 2021-09-01 RX ORDER — BUPROPION HYDROCHLORIDE 150 MG/1
TABLET ORAL
Qty: 90 TABLET | Refills: 0 | OUTPATIENT
Start: 2021-09-01

## 2021-09-10 ENCOUNTER — PATIENT MESSAGE (OUTPATIENT)
Dept: ENDOCRINOLOGY CLINIC | Facility: CLINIC | Age: 36
End: 2021-09-10

## 2021-09-15 NOTE — TELEPHONE ENCOUNTER
From: Ildefonso Laura  To: Asuncion Ziegler MD  Sent: 9/10/2021 9:51 PM CDT  Subject: Prescription Question    Hi! Just verifying about the ozempic, am I staying on the 0.25mg dose until my follow up appointment in November?  I have taken two doses so far (ev

## 2021-09-30 ENCOUNTER — IMMUNIZATION (OUTPATIENT)
Dept: LAB | Facility: HOSPITAL | Age: 36
End: 2021-09-30
Attending: EMERGENCY MEDICINE
Payer: COMMERCIAL

## 2021-09-30 DIAGNOSIS — Z23 NEED FOR VACCINATION: Primary | ICD-10-CM

## 2021-09-30 PROCEDURE — 0003A SARSCOV2 VAC 30MCG/0.3ML IM: CPT

## 2021-09-30 PROCEDURE — 0004A SARSCOV2 VAC 30MCG/0.3ML IM: CPT

## 2021-11-03 ENCOUNTER — PATIENT MESSAGE (OUTPATIENT)
Dept: ENDOCRINOLOGY CLINIC | Facility: CLINIC | Age: 36
End: 2021-11-03

## 2021-11-04 NOTE — TELEPHONE ENCOUNTER
From: Kieran Contreras  To: Lui Paredes MD  Sent: 11/3/2021 7:39 PM CDT  Subject: Ozempic    Hi! Can we refill the ozempic for 1mg to the home delivery pharmacy on my profile please.  Side effects of the 0.5mg is minimal. I actually have gained weight s

## 2021-11-05 RX ORDER — SEMAGLUTIDE 1.34 MG/ML
1 INJECTION, SOLUTION SUBCUTANEOUS WEEKLY
Qty: 9 ML | Refills: 0 | Status: SHIPPED | OUTPATIENT
Start: 2021-11-05

## 2021-11-05 NOTE — TELEPHONE ENCOUNTER
Replied to patient's mychart message with notes from Dr. Latrice Terry below. Awaiting patient response.

## 2021-11-05 NOTE — TELEPHONE ENCOUNTER
Okay to send ozempic 1 mg q week  There are two home delivery phramacies on her chart, please check which one   Thanks

## 2021-11-23 ENCOUNTER — OFFICE VISIT (OUTPATIENT)
Dept: ENDOCRINOLOGY CLINIC | Facility: CLINIC | Age: 36
End: 2021-11-23
Payer: COMMERCIAL

## 2021-11-23 VITALS
DIASTOLIC BLOOD PRESSURE: 82 MMHG | SYSTOLIC BLOOD PRESSURE: 128 MMHG | HEART RATE: 94 BPM | BODY MASS INDEX: 40 KG/M2 | WEIGHT: 245 LBS

## 2021-11-23 DIAGNOSIS — E66.9 OBESITY (BMI 30-39.9): Primary | ICD-10-CM

## 2021-11-23 PROCEDURE — 3079F DIAST BP 80-89 MM HG: CPT | Performed by: INTERNAL MEDICINE

## 2021-11-23 PROCEDURE — 99213 OFFICE O/P EST LOW 20 MIN: CPT | Performed by: INTERNAL MEDICINE

## 2021-11-23 PROCEDURE — 3074F SYST BP LT 130 MM HG: CPT | Performed by: INTERNAL MEDICINE

## 2021-11-23 NOTE — PROGRESS NOTES
Return Office Visit     CHIEF COMPLAINT:    BMI 37    HISTORY OF PRESENT ILLNESS:  Ildefonso Laura is a 38year old female who presents for follow up for high BMI      She was started on phetermine-topiramate and has lost about 50 pounds   Recent stress at h numbness, weakness  Genito-Urinary: Negative for: dysuria, frequency  Psychiatric: mood changes as above, stable  Hematology/Lymphatics: Negative for: bruising, lower extremity edema  Endocrine: Negative for: polyuria, polydypsia  Skin: Negative for: rash, juana Ledesma MD

## 2022-03-11 ENCOUNTER — NURSE TRIAGE (OUTPATIENT)
Dept: FAMILY MEDICINE CLINIC | Facility: CLINIC | Age: 37
End: 2022-03-11

## 2022-03-11 ENCOUNTER — LAB ENCOUNTER (OUTPATIENT)
Dept: LAB | Facility: HOSPITAL | Age: 37
End: 2022-03-11
Attending: FAMILY MEDICINE
Payer: COMMERCIAL

## 2022-03-11 DIAGNOSIS — N92.6 MENSTRUAL ABNORMALITY: ICD-10-CM

## 2022-03-11 LAB — HCG SERPL QL: NEGATIVE

## 2022-03-11 PROCEDURE — 84703 CHORIONIC GONADOTROPIN ASSAY: CPT

## 2022-03-11 PROCEDURE — 36415 COLL VENOUS BLD VENIPUNCTURE: CPT

## 2023-02-01 ENCOUNTER — APPOINTMENT (OUTPATIENT)
Dept: CT IMAGING | Age: 38
End: 2023-02-01
Attending: PHYSICIAN ASSISTANT
Payer: MEDICAID

## 2023-02-01 ENCOUNTER — HOSPITAL ENCOUNTER (OUTPATIENT)
Age: 38
Discharge: HOME | End: 2023-02-01
Payer: MEDICAID

## 2023-02-01 VITALS
DIASTOLIC BLOOD PRESSURE: 81 MMHG | TEMPERATURE: 98 F | SYSTOLIC BLOOD PRESSURE: 127 MMHG | HEART RATE: 94 BPM | OXYGEN SATURATION: 98 % | RESPIRATION RATE: 18 BRPM

## 2023-02-01 DIAGNOSIS — N39.0 URINARY TRACT INFECTION WITHOUT HEMATURIA, SITE UNSPECIFIED: ICD-10-CM

## 2023-02-01 DIAGNOSIS — R10.9 ABDOMINAL PAIN OF UNKNOWN ETIOLOGY: Primary | ICD-10-CM

## 2023-02-01 LAB
#MXD IC: 0.7 X10ˆ3/UL (ref 0.1–1)
B-HCG UR QL: NEGATIVE
BUN BLD-MCNC: 11 MG/DL (ref 7–18)
CHLORIDE BLD-SCNC: 104 MMOL/L (ref 98–112)
CO2 BLD-SCNC: 25 MMOL/L (ref 21–32)
CREAT BLD-MCNC: 0.7 MG/DL
GFR SERPLBLD BASED ON 1.73 SQ M-ARVRAT: 113 ML/MIN/1.73M2 (ref 60–?)
GLUCOSE BLD-MCNC: 97 MG/DL (ref 70–99)
HCT VFR BLD AUTO: 37.5 %
HCT VFR BLD CALC: 39 %
HGB BLD-MCNC: 12.4 G/DL
ISTAT IONIZED CALCIUM FOR CHEM 8: 1.22 MMOL/L (ref 1.12–1.32)
LYMPHOCYTES # BLD AUTO: 2.5 X10ˆ3/UL (ref 1–4)
LYMPHOCYTES NFR BLD AUTO: 21.6 %
MCH RBC QN AUTO: 28.3 PG (ref 26–34)
MCHC RBC AUTO-ENTMCNC: 33.1 G/DL (ref 31–37)
MCV RBC AUTO: 85.6 FL (ref 80–100)
MIXED CELL %: 6.2 %
NEUTROPHILS # BLD AUTO: 8.2 X10ˆ3/UL (ref 1.5–7.7)
NEUTROPHILS NFR BLD AUTO: 72.2 %
PLATELET # BLD AUTO: 390 X10ˆ3/UL (ref 150–450)
POCT BILIRUBIN URINE: NEGATIVE
POCT GLUCOSE URINE: NEGATIVE MG/DL
POCT KETONE URINE: NEGATIVE MG/DL
POCT NITRITE URINE: NEGATIVE
POCT PH URINE: 6 (ref 5–8)
POCT SPECIFIC GRAVITY URINE: 1.02
POCT URINE COLOR: YELLOW
POCT UROBILINOGEN URINE: 0.2 MG/DL
POTASSIUM BLD-SCNC: 4 MMOL/L (ref 3.6–5.1)
RBC # BLD AUTO: 4.38 X10ˆ6/UL
SODIUM BLD-SCNC: 140 MMOL/L (ref 136–145)
WBC # BLD AUTO: 11.4 X10ˆ3/UL (ref 4–11)

## 2023-02-01 PROCEDURE — 74176 CT ABD & PELVIS W/O CONTRAST: CPT | Performed by: PHYSICIAN ASSISTANT

## 2023-02-01 PROCEDURE — 87186 SC STD MICRODIL/AGAR DIL: CPT | Performed by: PHYSICIAN ASSISTANT

## 2023-02-01 PROCEDURE — 87086 URINE CULTURE/COLONY COUNT: CPT | Performed by: PHYSICIAN ASSISTANT

## 2023-02-01 PROCEDURE — 87591 N.GONORRHOEAE DNA AMP PROB: CPT | Performed by: PHYSICIAN ASSISTANT

## 2023-02-01 PROCEDURE — 87660 TRICHOMONAS VAGIN DIR PROBE: CPT | Performed by: PHYSICIAN ASSISTANT

## 2023-02-01 PROCEDURE — 87510 GARDNER VAG DNA DIR PROBE: CPT | Performed by: PHYSICIAN ASSISTANT

## 2023-02-01 PROCEDURE — 87088 URINE BACTERIA CULTURE: CPT | Performed by: PHYSICIAN ASSISTANT

## 2023-02-01 PROCEDURE — 87491 CHLMYD TRACH DNA AMP PROBE: CPT | Performed by: PHYSICIAN ASSISTANT

## 2023-02-01 PROCEDURE — 87480 CANDIDA DNA DIR PROBE: CPT | Performed by: PHYSICIAN ASSISTANT

## 2023-02-01 RX ORDER — CEPHALEXIN 500 MG/1
500 CAPSULE ORAL 4 TIMES DAILY
Qty: 20 CAPSULE | Refills: 0 | Status: SHIPPED | OUTPATIENT
Start: 2023-02-01 | End: 2023-02-06

## 2023-02-01 NOTE — ED INITIAL ASSESSMENT (HPI)
Lower mid abd pain since yesterday. States she had a similar episode in 2019 and seen in the ED with no diagnosis. States the pain comes and goes. Pain now radiated to her low back. Pain 8/10. Pain kept her up all night. States talked to her OB/gyne but cannot get an appt.  Pt feels a CT would tell her what is going on

## 2023-02-01 NOTE — DISCHARGE INSTRUCTIONS
Take antibiotic as prescribed. You will be notified of culture results via [address] Follow-up with your primary care provider as well as GYN. Will likely benefit from in outpatient transvaginal ultrasound. Go to ER with any new or worsening symptoms.

## 2023-02-02 LAB
C TRACH DNA SPEC QL NAA+PROBE: NEGATIVE
N GONORRHOEA DNA SPEC QL NAA+PROBE: NEGATIVE

## 2023-02-02 RX ORDER — FLUCONAZOLE 150 MG/1
150 TABLET ORAL ONCE
Qty: 1 TABLET | Refills: 0 | Status: SHIPPED | OUTPATIENT
Start: 2023-02-02 | End: 2023-02-02

## 2023-02-02 RX ORDER — METRONIDAZOLE 500 MG/1
500 TABLET ORAL 2 TIMES DAILY
Qty: 14 TABLET | Refills: 0 | Status: SHIPPED | OUTPATIENT
Start: 2023-02-02 | End: 2023-02-09

## 2023-02-03 ENCOUNTER — OFFICE VISIT (OUTPATIENT)
Dept: OBGYN CLINIC | Facility: CLINIC | Age: 38
End: 2023-02-03
Payer: MEDICAID

## 2023-02-03 VITALS
BODY MASS INDEX: 42.01 KG/M2 | DIASTOLIC BLOOD PRESSURE: 80 MMHG | HEART RATE: 87 BPM | WEIGHT: 261.38 LBS | SYSTOLIC BLOOD PRESSURE: 122 MMHG | HEIGHT: 66 IN

## 2023-02-03 DIAGNOSIS — Z97.5 IUD (INTRAUTERINE DEVICE) IN PLACE: ICD-10-CM

## 2023-02-03 DIAGNOSIS — R10.2 CHRONIC PELVIC PAIN IN FEMALE: Primary | ICD-10-CM

## 2023-02-03 DIAGNOSIS — G89.29 CHRONIC PELVIC PAIN IN FEMALE: Primary | ICD-10-CM

## 2023-02-03 PROCEDURE — 99202 OFFICE O/P NEW SF 15 MIN: CPT | Performed by: NURSE PRACTITIONER

## 2023-02-03 RX ORDER — FLUCONAZOLE 150 MG/1
TABLET ORAL
COMMUNITY
Start: 2023-02-02

## 2023-02-03 RX ORDER — ZOLPIDEM TARTRATE 10 MG/1
TABLET ORAL
COMMUNITY
Start: 2022-08-16

## 2023-02-03 NOTE — PROGRESS NOTES
Subjective:  44year old [id number]   Patient presents with:  Gyn Problem: Seen in immediate care for pelvic pain. Tolded to f/u with gyn    Pt here today to follow up on recent urgent care visit  Pt notes 4 year history of lower abdominal/pelvic pain  Has history of 3 vaginal deliveries  Currently has IUD in place  Pain is described as dull ache  Per pt probably occurs once a week  Nothing aggravates pain and nothing relieves pain  Notes she has large amounts of stress  Currently on antibiotics for UTI    Review of Systems:  Pertinent items are noted in the HPI. Objective:  /80   Pulse 87   Ht 66\"   Wt 261 lb 6.4 oz (118.6 kg)     Physical Examination:  General appearance: Well dressed, well nourished in no apparent distress  Neurologic/Psychiatric: Alert and oriented to person, place and time, mood normal, affect appropriate  Abdomen: Soft, non-tender, non-distended, no masses, no hepatosplenomegaly, no hernias, no inguinal lymphadenopathy  Pelvic:    External genitalia- Normal, Bartholin's, urethra, skeins glands normal   Vagina- + IUD strings visualized, No vaginal lesions, physiologic discharge   Cervix- No lesions, long/closed, no cervical motion tenderness   Uterus- Normal, non-tender, no masses   Adnexa-  Non-tender, no masses    Assessment/Plan:    Diagnoses and all orders for this visit:    Chronic pelvic pain in female  - reviewed labs, visit notes and imaging from  visit  - exam reassuring  - recommend pelvic US      - US PELVIS (TRANSABDOMINAL AND TRANSVAGINAL) (CPT=76856/66248); Future  - if negative, recommend follow up with PCP or GI  - also may consider referral to pelvic floor PT  - to follow up with new/worsening symptoms or no improvement    IUD (intrauterine device) in place  - placed 8/2018  - remove on or before 8/2025  - IUD string visualized      Return if symptoms worsen or fail to improve.

## 2023-02-15 ENCOUNTER — HOSPITAL ENCOUNTER (OUTPATIENT)
Dept: ULTRASOUND IMAGING | Age: 38
Discharge: HOME | End: 2023-02-15
Attending: NURSE PRACTITIONER
Payer: MEDICAID

## 2023-02-15 DIAGNOSIS — G89.29 CHRONIC PELVIC PAIN IN FEMALE: ICD-10-CM

## 2023-02-15 DIAGNOSIS — R10.2 CHRONIC PELVIC PAIN IN FEMALE: ICD-10-CM

## 2023-02-15 PROCEDURE — 76830 TRANSVAGINAL US NON-OB: CPT | Performed by: NURSE PRACTITIONER

## 2023-02-15 PROCEDURE — 76856 US EXAM PELVIC COMPLETE: CPT | Performed by: NURSE PRACTITIONER

## 2023-09-25 ENCOUNTER — PATIENT OUTREACH (OUTPATIENT)
Dept: FAMILY MEDICINE CLINIC | Facility: CLINIC | Age: 38
End: 2023-09-25

## 2023-10-23 NOTE — ED NOTES
Pt sleeping comfortable no headache no nausea no vomiting Have Your Skin Lesions Been Treated?: not been treated Is This A New Presentation, Or A Follow-Up?: Skin Lesions

## 2023-11-28 ENCOUNTER — TELEPHONE (OUTPATIENT)
Dept: FAMILY MEDICINE CLINIC | Facility: CLINIC | Age: 38
End: 2023-11-28

## 2023-11-28 NOTE — TELEPHONE ENCOUNTER
Leti from [employer] called to confirm if medical records were received. Were faxed 11/27          [Negative] : Genitourinary

## 2024-05-08 NOTE — PROGRESS NOTES
Irregular contractions. Reviewed strict labor precautions and kick counts.    RTC 1 wk- continue NSTs patient

## 2024-10-17 ENCOUNTER — TELEPHONE (OUTPATIENT)
Dept: OBGYN CLINIC | Facility: CLINIC | Age: 39
End: 2024-10-17

## 2024-10-17 DIAGNOSIS — N91.2 AMENORRHEA: Primary | ICD-10-CM

## 2024-10-17 NOTE — TELEPHONE ENCOUNTER
Patient calling to initiate prenatal care  LMP July. Pt states she has irregular periods and took pregnancy test 10/15  Patient is 7-8 weeks on unknown  Confirmation Ultrasound and Appointment scheduled on   Future Appointments   Date Time Provider Department Center   11/18/2024  9:00 AM [facility] US [facility] [address]   11/18/2024  9:45 AM Nena Early MD [facility] [address]         Any history of ectopic pregnancy? no  Any history of miscarriage? 1  Any medications that you are taking on a regular basis other than prenatal vitamins? No (if not taking prenatal vitamins, encourage patient to start taking.)  Any bleeding since the first day of last LMP and your positive pregnancy test? no    Insurance bcbs     Patient states she has irregular periods-scheduled next available ultrasound for  at least 4 wks from positive pregnancy test

## 2024-11-18 ENCOUNTER — ULTRASOUND ENCOUNTER (OUTPATIENT)
Dept: OBGYN CLINIC | Facility: CLINIC | Age: 39
End: 2024-11-18
Payer: COMMERCIAL

## 2024-11-18 DIAGNOSIS — N91.2 AMENORRHEA: ICD-10-CM

## 2024-11-18 PROBLEM — O09.529 AMA (ADVANCED MATERNAL AGE) MULTIGRAVIDA 35+ (HCC): Status: ACTIVE | Noted: 2024-11-18

## 2024-11-18 PROBLEM — Z97.5 IUD CONTRACEPTION: Status: RESOLVED | Noted: 2021-06-28 | Resolved: 2024-11-18

## 2024-11-18 PROBLEM — F43.21 COMPLICATED GRIEF: Status: RESOLVED | Noted: 2021-06-28 | Resolved: 2024-11-18

## 2024-11-18 PROCEDURE — 76856 US EXAM PELVIC COMPLETE: CPT | Performed by: OBSTETRICS & GYNECOLOGY

## 2024-11-19 ENCOUNTER — INITIAL PRENATAL (OUTPATIENT)
Dept: OBGYN CLINIC | Facility: CLINIC | Age: 39
End: 2024-11-19
Payer: COMMERCIAL

## 2024-11-19 VITALS
HEIGHT: 66 IN | WEIGHT: 275 LBS | HEART RATE: 87 BPM | BODY MASS INDEX: 44.2 KG/M2 | DIASTOLIC BLOOD PRESSURE: 80 MMHG | SYSTOLIC BLOOD PRESSURE: 122 MMHG

## 2024-11-19 DIAGNOSIS — Z36.9 ENCOUNTER FOR ANTENATAL SCREENING OF MOTHER (HCC): ICD-10-CM

## 2024-11-19 DIAGNOSIS — O99.210 OBESITY AFFECTING PREGNANCY, ANTEPARTUM, UNSPECIFIED OBESITY TYPE (HCC): ICD-10-CM

## 2024-11-19 DIAGNOSIS — Z34.90 PRENATAL CARE, ANTEPARTUM (HCC): Primary | ICD-10-CM

## 2024-11-19 DIAGNOSIS — Z36.9 ANTENATAL SCREENING ENCOUNTER (HCC): ICD-10-CM

## 2024-11-19 DIAGNOSIS — T83.39XA RETAINED INTRAUTERINE CONTRACEPTIVE DEVICE (IUD): ICD-10-CM

## 2024-11-19 DIAGNOSIS — Z12.4 SCREENING FOR CERVICAL CANCER: ICD-10-CM

## 2024-11-19 DIAGNOSIS — O09.30 LATE PRENATAL CARE (HCC): ICD-10-CM

## 2024-11-19 DIAGNOSIS — O09.529 ANTEPARTUM MULTIGRAVIDA OF ADVANCED MATERNAL AGE (HCC): ICD-10-CM

## 2024-11-19 DIAGNOSIS — Z11.3 SCREEN FOR STD (SEXUALLY TRANSMITTED DISEASE): ICD-10-CM

## 2024-11-19 PROBLEM — F33.2 MDD (MAJOR DEPRESSIVE DISORDER), RECURRENT SEVERE, WITHOUT PSYCHOSIS (HCC): Status: RESOLVED | Noted: 2021-07-23 | Resolved: 2024-11-19

## 2024-11-19 PROBLEM — F43.9 STRESS AT HOME: Status: RESOLVED | Noted: 2019-09-23 | Resolved: 2024-11-19

## 2024-11-19 PROBLEM — Z63.5 MARITAL CONFLICT INVOLVING DIVORCE: Status: RESOLVED | Noted: 2021-06-28 | Resolved: 2024-11-19

## 2024-11-19 PROBLEM — Z63.4 SUDDEN DEATH OF FAMILY MEMBER: Status: RESOLVED | Noted: 2021-06-28 | Resolved: 2024-11-19

## 2024-11-19 PROBLEM — Z97.5 IUD CONTRACEPTION: Status: RESOLVED | Noted: 2021-06-28 | Resolved: 2024-11-19

## 2024-11-19 PROBLEM — F43.10 PTSD (POST-TRAUMATIC STRESS DISORDER): Status: RESOLVED | Noted: 2021-07-23 | Resolved: 2024-11-19

## 2024-11-19 PROBLEM — F32.A ANXIETY AND DEPRESSION: Status: ACTIVE | Noted: 2024-11-19

## 2024-11-19 PROBLEM — F41.1 GAD (GENERALIZED ANXIETY DISORDER): Status: RESOLVED | Noted: 2021-07-23 | Resolved: 2024-11-19

## 2024-11-19 PROBLEM — F41.9 ANXIETY AND DEPRESSION: Status: ACTIVE | Noted: 2024-11-19

## 2024-11-19 PROBLEM — O26.30: Status: ACTIVE | Noted: 2024-11-19

## 2024-11-19 PROBLEM — O26.30: Status: RESOLVED | Noted: 2024-11-19 | Resolved: 2024-11-19

## 2024-11-19 PROCEDURE — 87591 N.GONORRHOEAE DNA AMP PROB: CPT | Performed by: OBSTETRICS & GYNECOLOGY

## 2024-11-19 PROCEDURE — 3079F DIAST BP 80-89 MM HG: CPT | Performed by: OBSTETRICS & GYNECOLOGY

## 2024-11-19 PROCEDURE — 3074F SYST BP LT 130 MM HG: CPT | Performed by: OBSTETRICS & GYNECOLOGY

## 2024-11-19 PROCEDURE — 87491 CHLMYD TRACH DNA AMP PROBE: CPT | Performed by: OBSTETRICS & GYNECOLOGY

## 2024-11-19 PROCEDURE — 3008F BODY MASS INDEX DOCD: CPT | Performed by: OBSTETRICS & GYNECOLOGY

## 2024-11-19 NOTE — PROGRESS NOTES
Subjective:  Chief Complaint   Patient presents with    Prenatal Care     Nob - lmp- pt had IUD taken out in October when she found out she was pregnant - spotting was in July.      38 year old  female    presents for new OB visit    No LMP recorded. Patient is pregnant. Estimated Date of Delivery: 25   Patient without complaints since finding out she is pregnant.  She is taking an OTC PNV with DHA.  She has no concerns regarding this pregnancy.  Failed IUD.  Removed by patient herself at 14 wks of pregnancy  Previously treated for PTSD/anxiety/depression briefly with multiple meds, did not work.  Nothing recently and doing better.  Previous pregnancies uncomplicated vaginal deliveries.  No hypertension,  labor or diabetes.      OB History    Para Term  AB Living   5 3 3 0 1 3   SAB IAB Ectopic Multiple Live Births   1 0 0 0 3      # Outcome Date GA Lbr Oswaldo/2nd Weight Sex Type Anes PTL Lv   5 Current            4 Term 18 40w6d 09:40 / 00:05 7 lb 10.6 oz (3.475 kg) F NORMAL SPONT EPI N ALONDRA      Complications: Variable decelerations   3 Term 17 39w4d 03:39 / 00:05 7 lb 11.6 oz (3.505 kg) M NORMAL SPONT EPI N ALONDRA      Birth Comments: Time of Delivery:2:04 pm  Mother's age:32  Maternal Blood Type: A  Maternal RH Factor: +  :3  Para:20  Bili:5.6      Biliruben: 7.9      Complications: Variable decelerations   2 Term 09/11/15   7 lb 8 oz (3.402 kg) F NORMAL SPONT EPI N ALONDRA   1 SAB 11 12w0d   U          Birth Comments: D&C @ [facility]      Obstetric Comments           Past Medical History:    Anxiety    Anxiety state, unspecified    Benign brain tumor (HCC)    Meningioma     Depression    Hx of abnormal cervical Pap smear    Hx of varicella    in childhood     PCOS (polycystic ovarian syndrome)    Pneumonia    PTSD (post-traumatic stress disorder)    TB (tuberculosis)    + skin test, neg chest xray    TB lung, latent    Urinary incontinence    stress incontinence     Viral meningitis (HCC)     Past Surgical History:   Procedure Laterality Date    D & c  2011    Eye muscle surg proc unlisted Bilateral     Laparoscopic cholecystectomy  03/15/16    Lasik  2008    Removal gallbladder      Tonsillectomy  2004    Upper gi endoscopy; w/endoscopic u/s esophageal exam  2013     Medications:  Medications Ordered Prior to Encounter[1]    Allergies:  Allergies[2]    Social History:  Social History     Tobacco Use    Smoking status: Former     Current packs/day: 0.00     Types: Cigarettes     Quit date: 2/3/2009     Years since quitting: 15.8    Smokeless tobacco: Former    Tobacco comments:     per [employer] - no   Substance Use Topics    Alcohol use: No     Alcohol/week: 0.0 standard drinks of alcohol     Comment: None.      Family History:  Family History   Problem Relation Age of Onset    Diabetes Father     High Cholesterol Father     Lipids Father         hyperlipidemia    Other (Other) Father         Gallbladder disease, ERCP with stent    High Blood Pressure Mother     Hypertension Mother      Review of Systems:  Pertinent items are noted in HPI.    Objective:  /80   Pulse 87   Ht 66\"   Wt 275 lb (124.7 kg)   BMI 44.39 kg/m²    Physical Examination:  General appearance: Well dressed, well nourished in no apparent distress  Neurologic/Psychiatric: Alert and oriented to person, place and time, mood normal, affect appropriate  Head: Normocephalic without obvious deformity, atraumatic  Neck: No thyromegaly, supple, non-tender, no masses, no adenopathy  Lungs: Clear to auscultation bilaterally, no rales, wheezes or rhonchi  Breasts: Symmetric, non-tender, no masses, lesions, retraction, dimpling or discharge bilaterally, no axillary or supraclavicular lymphadenopathy  Heart:: Regular rate and rhythm, no gallops or murmurs  Abdomen: Soft, non-tender, non-distended, no masses, no hepatosplenomegaly, no hernias, no inguinal lymphadenopathy  Pelvic:    External genitalia- Normal,  Bartholin's, urethra, skeins glands normal   Vagina- No vaginal lesions, no discharge   Cervix- No lesions, long/closed, no cervical motion tenderness   Uterus- 18 week sized, non-tender, no masses   Adnexa-  Non-tender, no masses   Pelvimetry- Normal pelvimetry, normal arch  Extremities: Non-tender, full range of motion, no clubbing, cyanosis or edema  Skin:  General inspection- no rashes, lesions or discoloration      Ultrasound 24  LMP unknown   DOC ETHAN GA(ETHAN)   GA(AUA) 18w4d   ETHAN(AUA) 2025   EFW ([place]) Value Range Age Range GP (Jose)   AC/BPD/FL/HC 229g (8oz) ± 33g N/A   Ventricular  bpm   Fetal Anatomy   Placenta Location anterior   Fetal Position breech   3 Vessel Cord   Amniotic Fluid normal amount   Cervix normal   ETHAN 25 by second trimester ultrasound     Assessment:  38 year old  EGA 18d ETHAN 25  Amenorrhea, positive pregnancy test.  Addressed patients concerns regarding pregnancy.  Current problems for this pregnancy:   AMA- counseled.  Declines aneuploidy screening.  MFM/Level 2  Obesity- check early glucola.  Level 2.  BASA  IUD in place until 14 wks of pregnancy  Hx anxiety and depression- no recent Rx.  Counseled.  Late prenatal care and dating by 18 wk US- MFM consult.  Likely recommend delivery by due date, antepartum testing and serial growth US's  Hx of meningioma- no recent issues.    Plan:  Pap smear/STD screen obtained.  Order for prenatal labs with glucola given.  Patient consents to HIV testing, counseled and cleared for release of HIV test results to patient.  Discussed optional prenatal screening tests including cfDNA, CF/carrier screen, NT/first trimester screen, Quad Screen/AFP, Level II ultrasound and amniocentesis/CVS.  Desires Level 2.  Declines all other optional testing.  Prenatal vitamins with DHA.  New OB education completed.  Vaccine recommendations reviewed including Pertussis 26-37 weeks, flu vaccine any trimester, Covid vaccine and  booster.  Discussed diet, exercise, travel, food and medication safety.  Information sheets on prenatal screening tests, vaccine recommendations, nausea in pregnancy and prenatal care given.    Patient offered chaperone for exam, declined    Diagnoses and all orders for this visit:    Prenatal care, antepartum ([facility])     screening encounter ([facility])  -     HCV Antibody; Future  -     PRENATAL PROFILE 1; Future  -     Urine Culture, Routine; Future    Screening for cervical cancer  -     ThinPrep Pap with HPV Reflex, Chlamydia/GC; Future    Screen for STD (sexually transmitted disease)  -     Chlamydia/Gc Amplification; Future    Antepartum multigravida of advanced maternal age ([facility])  -     OP REFERRAL TO  CONSULT    Late prenatal care ([facility])  -     OP REFERRAL TO  CONSULT    Obesity affecting pregnancy, antepartum, unspecified obesity type ([facility])  -     OP REFERRAL TO  CONSULT  -     Glucose Tolerance, 50 gm (1 hr), Gestational (ADA); Future    Retained intrauterine contraceptive device (IUD)  -     OP REFERRAL TO  CONSULT    Encounter for  screening of mother ([facility])  -     Glucose Tolerance, 50 gm (1 hr), Gestational (ADA); Future        Return in about 4 weeks (around 2024) for Routine Prenatal Visit, Labs Next Available.              [1]   Current Outpatient Medications on File Prior to Visit   Medication Sig Dispense Refill    Prenatal Vit-DSS-Fe Cbn-FA (PRENATAL AD OR) Take by mouth.      fluconazole 150 MG Oral Tab  (Patient not taking: Reported on 2024)       Current Facility-Administered Medications on File Prior to Visit   Medication Dose Route Frequency Provider Last Rate Last Admin    Levonorgestrel IUD 20 mcg  1 Device Intrauterine Once Linnea Osborne MD       [2]   Allergies  Allergen Reactions    Lamictal OTHER (SEE COMMENTS)    Lexapro [Escitalopram] OTHER (SEE COMMENTS)     Suicidal ideation

## 2024-11-20 ENCOUNTER — LAB ENCOUNTER (OUTPATIENT)
Dept: LAB | Age: 39
End: 2024-11-20
Attending: OBSTETRICS & GYNECOLOGY
Payer: COMMERCIAL

## 2024-11-20 DIAGNOSIS — Z36.9 ENCOUNTER FOR ANTENATAL SCREENING OF MOTHER (HCC): ICD-10-CM

## 2024-11-20 DIAGNOSIS — O99.210 OBESITY AFFECTING PREGNANCY, ANTEPARTUM, UNSPECIFIED OBESITY TYPE (HCC): ICD-10-CM

## 2024-11-20 DIAGNOSIS — Z36.9 ANTENATAL SCREENING ENCOUNTER (HCC): ICD-10-CM

## 2024-11-20 LAB
ANTIBODY SCREEN: NEGATIVE
BASOPHILS # BLD AUTO: 0.01 X10(3) UL (ref 0–0.2)
BASOPHILS NFR BLD AUTO: 0.1 %
C TRACH DNA SPEC QL NAA+PROBE: NEGATIVE
EOSINOPHIL # BLD AUTO: 0.14 X10(3) UL (ref 0–0.7)
EOSINOPHIL NFR BLD AUTO: 1.5 %
ERYTHROCYTE [DISTWIDTH] IN BLOOD BY AUTOMATED COUNT: 12.8 %
GLUCOSE 1H P GLC SERPL-MCNC: 114 MG/DL
HBV SURFACE AG SER-ACNC: <0.1 [IU]/L
HBV SURFACE AG SERPL QL IA: NONREACTIVE
HCT VFR BLD AUTO: 34.6 %
HCV AB SERPL QL IA: NONREACTIVE
HGB BLD-MCNC: 11.1 G/DL
IMM GRANULOCYTES # BLD AUTO: 0.04 X10(3) UL (ref 0–1)
IMM GRANULOCYTES NFR BLD: 0.4 %
LYMPHOCYTES # BLD AUTO: 1.96 X10(3) UL (ref 1–4)
LYMPHOCYTES NFR BLD AUTO: 21.1 %
MCH RBC QN AUTO: 27.4 PG (ref 26–34)
MCHC RBC AUTO-ENTMCNC: 32.1 G/DL (ref 31–37)
MCV RBC AUTO: 85.4 FL
MONOCYTES # BLD AUTO: 0.44 X10(3) UL (ref 0.1–1)
MONOCYTES NFR BLD AUTO: 4.7 %
N GONORRHOEA DNA SPEC QL NAA+PROBE: NEGATIVE
NEUTROPHILS # BLD AUTO: 6.71 X10 (3) UL (ref 1.5–7.7)
NEUTROPHILS # BLD AUTO: 6.71 X10(3) UL (ref 1.5–7.7)
NEUTROPHILS NFR BLD AUTO: 72.2 %
PLATELET # BLD AUTO: 350 10(3)UL (ref 150–450)
RBC # BLD AUTO: 4.05 X10(6)UL
RH BLOOD TYPE: POSITIVE
RUBV IGG SER QL: POSITIVE
RUBV IGG SER-ACNC: 78 IU/ML (ref 10–?)
T PALLIDUM AB SER QL IA: NONREACTIVE
WBC # BLD AUTO: 9.3 X10(3) UL (ref 4–11)

## 2024-11-20 PROCEDURE — 86762 RUBELLA ANTIBODY: CPT | Performed by: OBSTETRICS & GYNECOLOGY

## 2024-11-20 PROCEDURE — 87340 HEPATITIS B SURFACE AG IA: CPT | Performed by: OBSTETRICS & GYNECOLOGY

## 2024-11-20 PROCEDURE — 86901 BLOOD TYPING SEROLOGIC RH(D): CPT | Performed by: OBSTETRICS & GYNECOLOGY

## 2024-11-20 PROCEDURE — 85025 COMPLETE CBC W/AUTO DIFF WBC: CPT | Performed by: OBSTETRICS & GYNECOLOGY

## 2024-11-20 PROCEDURE — 86850 RBC ANTIBODY SCREEN: CPT | Performed by: OBSTETRICS & GYNECOLOGY

## 2024-11-20 PROCEDURE — 86780 TREPONEMA PALLIDUM: CPT | Performed by: OBSTETRICS & GYNECOLOGY

## 2024-11-20 PROCEDURE — 86803 HEPATITIS C AB TEST: CPT | Performed by: OBSTETRICS & GYNECOLOGY

## 2024-11-20 PROCEDURE — 86900 BLOOD TYPING SEROLOGIC ABO: CPT | Performed by: OBSTETRICS & GYNECOLOGY

## 2024-11-20 PROCEDURE — 82950 GLUCOSE TEST: CPT | Performed by: OBSTETRICS & GYNECOLOGY

## 2024-11-20 PROCEDURE — 87389 HIV-1 AG W/HIV-1&-2 AB AG IA: CPT | Performed by: OBSTETRICS & GYNECOLOGY

## 2024-11-20 PROCEDURE — 87086 URINE CULTURE/COLONY COUNT: CPT | Performed by: OBSTETRICS & GYNECOLOGY

## 2024-11-28 LAB
.: NORMAL
.: NORMAL

## 2024-12-10 ENCOUNTER — OFFICE VISIT (OUTPATIENT)
Dept: PERINATAL CARE | Facility: HOSPITAL | Age: 39
End: 2024-12-10
Attending: OBSTETRICS & GYNECOLOGY
Payer: COMMERCIAL

## 2024-12-10 VITALS
WEIGHT: 283 LBS | HEIGHT: 66 IN | BODY MASS INDEX: 45.48 KG/M2 | SYSTOLIC BLOOD PRESSURE: 122 MMHG | HEART RATE: 93 BPM | DIASTOLIC BLOOD PRESSURE: 76 MMHG

## 2024-12-10 DIAGNOSIS — O09.30 LATE PRENATAL CARE (HCC): ICD-10-CM

## 2024-12-10 DIAGNOSIS — I73.00 RAYNAUD'S SYNDROME: ICD-10-CM

## 2024-12-10 DIAGNOSIS — O09.522 MULTIGRAVIDA OF ADVANCED MATERNAL AGE IN SECOND TRIMESTER (HCC): Primary | ICD-10-CM

## 2024-12-10 DIAGNOSIS — O99.210 OBESITY AFFECTING PREGNANCY, ANTEPARTUM (HCC): ICD-10-CM

## 2024-12-10 DIAGNOSIS — E66.9 OBESITY (BMI 30-39.9): ICD-10-CM

## 2024-12-10 DIAGNOSIS — O99.212 MATERNAL MORBID OBESITY IN SECOND TRIMESTER, ANTEPARTUM (HCC): ICD-10-CM

## 2024-12-10 DIAGNOSIS — O09.522 MULTIGRAVIDA OF ADVANCED MATERNAL AGE IN SECOND TRIMESTER (HCC): ICD-10-CM

## 2024-12-10 DIAGNOSIS — E66.01 MATERNAL MORBID OBESITY IN SECOND TRIMESTER, ANTEPARTUM (HCC): ICD-10-CM

## 2024-12-10 DIAGNOSIS — O09.529 ANTEPARTUM MULTIGRAVIDA OF ADVANCED MATERNAL AGE (HCC): ICD-10-CM

## 2024-12-10 PROCEDURE — 76811 OB US DETAILED SNGL FETUS: CPT | Performed by: OBSTETRICS & GYNECOLOGY

## 2024-12-10 NOTE — PROGRESS NOTES
Outpatient Maternal-Fetal Medicine Consultation    Dear Dr. Yates,    Thank you for requesting ultrasound evaluation and maternal fetal medicine consultation on your patient Maynor Prater.  As you are aware she is a 39 year old female with a Rogers pregnancy at 21w5d.  A maternal-fetal medicine consultation was requested secondary to advanced maternal age and class III obesity.  Her prenatal records and labs were reviewed.    She declined aneuploidy screening and testing.  She had a 1 hour glucose tolerance test  which she passed.    This pregnancy was unknown expected.  She had an IUD in place which has been in place for just over 5 years.  She started to feel symptoms of pregnancy took a test and it was positive.  She could feel the strings of the IUD in her vagina.  She estimates she was at about 14 weeks at this point.  She pulled the strings and the IUD came out very easily.  No cramping or bleeding afterwards.    HISTORY  OB History    Para Term  AB Living   5 3 3 0 1 3   SAB IAB Ectopic Multiple Live Births   1 0 0 0 3   Obstetric Comments           # 1 - Date: 11, Sex: Unknown, Weight: None, GA: 12w0d, Type: None, Apgar1: None, Apgar5: None, Living: None, Birth Comments: [facility]    # 2 - Date: 09/11/15, Sex: Female, Weight: 7 lb 8 oz (3.402 kg), GA: None, Type: Normal spontaneous vaginal delivery, Apgar1: 8, Apgar5: 9, Living: Living, Birth Comments: None    # 3 - Date: 17, Sex: Male, Weight: 7 lb 11.6 oz (3.505 kg), GA: 39w4d, Type: Normal spontaneous vaginal delivery, Apgar1: 9, Apgar5: 9, Living: Living, Birth Comments: Time of Delivery:2:04 pm  Mother's age:32  Maternal Blood Type: A  Maternal RH Factor: +  :3  Para:20  Bili:5.6      Biliruben: 7.9    # 4 - Date: 18, Sex: Female, Weight: 7 lb 10.6 oz (3.475 kg), GA: 40w6d, Type: Normal spontaneous vaginal delivery, Apgar1: 8, Apgar5: 9, Living: Living, Birth Comments: None    # 5 - Date: None,  Sex: None, Weight: None, GA: None, Type: None, Apgar1: None, Apgar5: None, Living: None, Birth Comments: None    Past Medical History  The patient  has a past medical history of Anxiety, Anxiety state, unspecified, Benign brain tumor (HCC) (2015), Depression, abnormal cervical Pap smear, varicella, Morbid obesity with BMI of 40.0-44.9, adult (HCC), PCOS (polycystic ovarian syndrome), Pneumonia (), PTSD (post-traumatic stress disorder), TB (tuberculosis), TB lung, latent, Urinary incontinence, and Viral meningitis (HCC) ().    She has no past medical history of Blood disorder, Breast disorder, Congenital anomaly of heart (HCC), Difficult intubation, Disorder of liver, Genitourinary disease, Gestational diabetes ([facility]), H/O myomectomy, Hearing impairment, Herpes, History of blood transfusion, History of  section, classical, HIV infection ([facility]), Hypothyroidism, Infertility, female, Kidney disease, Malignant hyperthermia, Pneumonia due to organism, Post partum depression, Prediabetes, Pregnancy-induced hypertension ([facility]),  labor ([facility]), Rh incompatibility, Systemic lupus (HCC), Thyrotoxicosis, Uterine rupture, or Visual impairment.    Past Surgical History  The patient  has a past surgical history that includes eye muscle surg proc unlisted (Bilateral); LASIK (); tonsillectomy (); upper gi endoscopy; w/endoscopic u/s esophageal exam (); laparoscopic cholecystectomy (03/15/16); d & c (); and removal gallbladder.    Family History  The patient She indicated that her mother is alive. She indicated that her father is alive.      Medications:   Current Outpatient Medications:     Prenatal Vit-DSS-Fe Cbn-FA (PRENATAL AD OR), Take by mouth., Disp: , Rfl:     fluconazole 150 MG Oral Tab, , Disp: , Rfl:   Allergies: Allergies[1]      PHYSICAL EXAMINATION:  /76 (BP Location: Right arm, Patient Position: Sitting, Cuff Size: large)   Pulse 93   Ht 5' 6\" (1.676 m)   Wt 283 lb (128.4  kg)   BMI 45.68 kg/m²   General: alert and oriented,no acute distress  Abdomen: gravid, soft, non-tender  Extremities: non-tender, no edema        OBSTETRIC ULTRASOUND  The patient had a level 2 obese, ultrasound today which I interpreted the results and reviewed them with the patient.    Ultrasound Findings:  Single IUP in cephalic presentation.    Placenta is anterior.   A 3 vessel cord is noted.  Cardiac activity is present at 139 bpm   g ( 0 lb 15 oz);   MVP is 5.5 cm .     Suboptimal or not obtained views:  Profile; 3 vessel view; 3 vessel trachea view; Ductal arch; and RVOT    The fetal measurements are consistent with the established EDC. No ultrasound evidence of structural abnormalities are seen today No ultrasound evidence of markers for aneuploidy are seen. She understands that ultrasound exam cannot exclude genetic abnormalities and that genetic testing is recommended. The limitations of ultrasound were discussed.     Uterus and adnexa appeared normal  today on US    See imaging tab for the complete US report.    DISCUSSION  During her visit we discussed and reviewed the following issues:  OBESITY:  Her BMI prior to pregnancy was 44  Obesity during pregnancy is associated with numerous maternal and  risks.  It is not clear whether obesity is a direct cause of adverse pregnancy outcome or whether the association between obesity and adverse pregnancy outcome is due to factors such as diabetes mellitus.   Data suggest that obese women should be encouraged to undertake a weight reduction program (diet, exercise, behavior modification, and possibly bariatric surgery in some cases) prior to attempting to conceive.            Subfertility in obese women is most commonly related to ovulatory dysfunction, and, in some obese women, the ovulatory dysfunction is related to polycystic ovary syndrome (PCOS). It is also important to note that even among ovulatory women, increasing obesity is associated  with decreasing spontaneous pregnancy rates.  The increased risk of miscarriage in obese women may be because such women often have PCOS or isolated insulin resistance.                 Due to its strong association with obesity in the general population, type 2 diabetes mellitus is one of the two most common medical complications of the obese . The increased risk of type 2 diabetes is primarily related to an exaggerated increase in insulin resistance in the obese state. It is reasonable to screen obese gravidas for undiagnosed pregestational diabetes in the first trimester.   Glucose intolerance associated with gestational diabetes generally resolves postpartum; however, obese women with a history of gestational diabetes have a two-fold increased prevalence of subsequent type 2 diabetes.           An association between obesity and hypertensive disorders during pregnancy has been consistently reported.  In particular, maternal weight and BMI are independent risk factors for preeclampsia.             Studies have found that the increased risk of  birth in obese gravidas is primarily associated with obesity-related medical and  complications, rather than an intrinsic predisposition to spontaneous  birth. Prevention of  birth in these patients, therefore, should be directed toward prevention or management of medical and obstetrical complications.               Both prepregnancy obesity and excessive maternal weight gain before or during pregnancy contribute to an increased probability of  delivery.  It has also been hypothesized that obesity may lead to dystocia due to increased soft tissue deposition in the maternal pelvis.    delivery in the obese  is associated with numerous perioperative concerns, including emergency delivery, prolonged incision to delivery interval, blood loss >1000 mL, longer operative times, wound infection, thromboembolism, and  endometritis.            Maternal obesity appears to be associated with a small increase in the absolute rate of some congenital anomalies (primarily neural tube defect and cardiac), and the risk may increase with increasing maternal weight.  Level II ultrasound is advised for women with obesity.  The risk of neural tube defects increased significantly with maternal weight.    The analysis found that overweight and obese pregnant women experienced significantly more stillbirths than normal weight women.  Third trimester  testing is advised.    We discussed the current recommendations for limited gestational weight gain in pregnancy for overweight and obese women.  The [place] [employer] currently recommends that women keep gestational weight gain to between 8-18 lbs.  We discussed the role of mild to moderate exercise, healthy food choices and appropriate portions sized to help achieve this goal.  Excess weight gain is associated with higher rates of gestational diabetes, hypertensive complications, fetal macrosomia and delivery complications.  Women with weight loss or insufficient weight gain have higher rates of small for gestational age infants.    A recent study found that initiating moderate exercise in early pregnancy for obese gravidas significantly reduced the incidence of gestational diabetes, gestational hypertension,  deliveries and C-sections.   I encouraged Safa to begin moderate exercise such as walking or stationary bike in the pregnancy.    ADVANCED MATERNAL AGE    Background  I reviewed with the patient that pregnancies in women of advanced maternal age (35 or older at delivery) are associated with elevated risks. Specifically, there is a higher rate of:  Fetal malformations  Preeclampsia  Gestational diabetes  Intrauterine fetal death    As a result, enhanced pregnancy surveillance is advised for these patients including a comprehensive ultrasound to assess for fetal  malformations (at 20 weeks) and a third trimester ultrasound assessment for fetal growth (at 32 weeks). In addition, weekly NST's (initiating at 36 weeks gestation for women 35-39 years and at 32 weeks gestation for women 40 years and older) are also advised. Routine obstetric care is more than adequate to assess for gestational diabetes and preeclampsia; hence, no further significant alterations in obstetric care are advised.    Medical Complications    Women 35 years of age or older can expect to experience two to three fold higher rates of hospitalization,  delivery, and pregnancy-related complications when compared to their younger counterparts.  The two most common medical problems complicating these  pregnanccies are hypertension and diabetes.   The incidence of preeclampsia in the general obstetric population is 3 to 4 percent; this increases to 5 to 10 percent in women over age 40 and is as high as 35 percent in women over age 50.   The incidence of gestational diabetes in the general obstetric population is 3 percent, rising to 7 to 12 percent in women over age 40 and 20 percent in women over age 50.  Women 35 years of age or older are more likely to be delivered by . The  delivery rate in the general obstetric population of the [place] is almost 30 percent, compared to almost 50 percent in women over age 40 to 45 and almost 80 percent in women age 50 to 63.          Fetal Death        A decision analysis tool using data from the [place] [employer]  Database predicted a strategy of weekly antepartum testing and labor induction would lower the risk of unexplained fetal death in women 35 years of age or older. In this model, weekly testing starting at 36 weeks of gestation would drop the risk of fetal death from 5.2 to 1.3 per 1000 pregnancies. While a policy of antepartum testing in older women does increase the chance that a women will be induced (71 inductions per  fetal death averted) and thereby increases her risk of having a  delivery, only 14 additional cesareans would need to be performed to avert one unexplained fetal death.  Hence, weekly NST's are advised for women of advanced maternal age; testing should be initiated at 36 weeks for women 35-39 years and at 32 weeks for women 40 years and older.    Fetal Malformations    Cardiac malformations, clubfoot, and diaphragmatic hernia appear to occur with increased frequency in offspring of older women. These abnormalities are structural and unrelated to aneuploidy, thus they would not be detected by karyotype analysis.  For these reasons a complete, detailed ultrasound (level II) is advised even if the fetus has a normal karyotype.      Fetal Aneuploidy  We also discussed the increased risk of chromosomal abnormalities associated with advanced maternal age. I reviewed that an ultrasound examination cannot reliably exclude potential genetic abnormalities. Given that the patient will be 39 years old at the time of delivery I reviewed that her risk (at amniocentesis) of having a fetus with any chromosome abnormality is 1:40 and with trisomy 21 is 1: 70.    Invasive Testing  I offered invasive genetic testing (amniocentesis, chorionic villus sampling) after reviewing the diagnostic accuracy of these tests as well as the procedure associated loss rate (1:500 for genetic amniocentesis).    She ultimately does not desire invasive genetic testing.     Non-invasive Pregnancy Testing (NIPT)  I reviewed current non-invasive screening options. Currently non-invasive pregnancy testing (NIPT) offers the highest detection rate (with the lowest false positive rate) for the detection of fetal aneuploidy amongst high-risk patients. The limitations of detailed mid-trimester sonography was reviewed with the patient. First trimester screening and second trimester multiple-marker serum serum screening as alternative aneuploidy screening  options were also reviewed. However, both of these tests are associated with lower detection and higher false positive rates.    We discussed the recommended plan of care based on her  risk factors.  Maynor had her questions answered to her satisfaction.  We discussed that the ultrasound was not completed and if the nasal bone is absent or hypoplastic that would raise her risk for aneuploidy.  She declined aneuploidy screening or testing since she would not act on the information.    IMPRESSION:  IUP at 21w5d  Scan consistent with dates, normal level 2 ultrasound but some views not yet completed  Advanced maternal age, aneuploidy screening and testing declined  Class III obesity complicating pregnancy    RECOMMENDATIONS:  Continue care with Dr. Yates  Follow-up in 2 to 3 weeks to complete views  Monthly growth ultrasounds starting at 28 weeks with the addition of a BPP with each growth assessment 32 weeks and beyond  Weekly NST's at 32 weeks.  Twice weekly testing at 38 weeks - weekly NST and weekly BPP.  Delivery at 39-40 weeks.  She was advised to limit gestational weight gain to less than 20 pounds  Daily low-dose aspirin to prevent preeclampsia until 37 weeks    Total time spent 55 minutes this calendar day which includes preparing to see the patient including chart review, obtaining and/or reviewing additional medical history, performing a physical exam and evaluation, documenting clinical information in the electronic medical record, independently interpreting results, counseling the patient, communicating results to the patient/family/caregiver and coordinating care.     Case discussed with patient who demonstrated understanding and agreement with plan.     Thank you for allowing me to participate in the care of this patient.  Please feel free to contact me with any questions.    Anca Garcia MD  Maternal-Fetal Medicine       Note to patient and family:  The 21st Century Cures Act makes medical notes  available to patients in the interest of transparency.  However, please be advised that this is a medical document.  It is intended as a peer to peer communication.  It is written in medical language and may contain abbreviations or verbiage that are technical and unfamiliar.  It may appear blunt or direct.  Medical documents are intended to carry relevant information, facts as evident, and the clinical opinion of the practitioner.         [1]   Allergies  Allergen Reactions    Lamictal OTHER (SEE COMMENTS)    Lexapro [Escitalopram] OTHER (SEE COMMENTS)     Suicidal ideation

## 2024-12-19 ENCOUNTER — TELEPHONE (OUTPATIENT)
Dept: PERINATAL CARE | Facility: HOSPITAL | Age: 39
End: 2024-12-19

## 2024-12-20 PROBLEM — Z86.018 HISTORY OF MENINGIOMA: Status: RESOLVED | Noted: 2019-09-23 | Resolved: 2024-12-20

## 2024-12-20 NOTE — PROGRESS NOTES
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 States she is living in a trailer right now as temporary housing. Notes she will check her blood pressure at work to monitor.     Doing well.  Denies LOF/VB/uctx. +FM. Endorses vaginal itching and discharge - speculum exam completed with visible white discharge. Vaginosis swab completed. Patient is not able to urinate for urine culture.     RH +  S/P Level 2 US and MFM consultation but incomplete views - patient states she cannot afford cost of MFM appointments and ultrasounds and will be cancelling all further scans with them. Discussed recommended importance with recommendation for testing through our office.       1 hr glucose and CBC (24-28wks) ordered.         Assessment:     Maynor Prater is a 39 year old  at 23w4d who presents for routine OB visit. Overall doing well.     Problem List Items Addressed This Visit    None  Visit Diagnoses       Second trimester pregnancy (HCC)    -  Primary    Vaginitis and vulvovaginitis        Relevant Orders    Vaginitis Vaginosis PCR Panel    Encounter for supervision of normal pregnancy, antepartum, unspecified  ([facility])        Relevant Orders    CBC (with DIFF, Platelet) Reflex to Ferritin    Glucose 1 HR OB                Plan:     Patient Active Problem List    Diagnosis    Elevated blood pressure affecting pregnancy, antepartum ([facility])     - Mild range BP x1 148/72 at 23w4d      Late prenatal care ([facility])     Dating by 18 wk US.    - delivery by due date, antepartum testing and growth US's      Retained intrauterine contraceptive device (IUD)     Failed IUD.  Pulled at 14 wks of pregnancy      Obesity affecting pregnancy, antepartum ([facility])     [ x ] Early glucola- normal  - MFM, Level 2      Anxiety and  depression     History.  With PTSD from marital problems, sudden loss of family member.  Briefly treated but not recently.      AMA (advanced maternal age) multigravida 35+ (HCC)     Declines aneuploidy screening  [ x ] MFM/Level 2- incomplete  [  ] BASA until 37 weeks   Follow-up in 2 to 3 weeks to complete views  Monthly growth ultrasounds starting at 28 weeks with the addition of a BPP with each growth assessment 32 weeks and beyond  Weekly NST's at 32 weeks.  Twice weekly testing at 38 weeks - weekly NST and weekly BPP.  Delivery at 39-40 weeks.  She was advised to limit gestational weight gain to less than 20 pounds      Gastroesophageal reflux disease without esophagitis    Obesity (BMI 30-39.9)    Vertigo    Meningioma (HCC)     Ok for vaginal delivery. Consider Neuro if symptoms   BELOW FROM FIRST PREGNANCY-   5/21/15-- note from Dr Stoner-- 5-10mm meningioma incidental finding.  pt ok for   3/13 Saw Dr. dale at [place]. Had asked for consult letter to mailed to us. Will request again.  Pt will meet with Neuro to discuss recurrence of dizziness sx since off of Klonopin and Effexor and delivery mgmt.      Reaction to tuberculin skin test without active tuberculosis     Multiple negative CXR's      Raynaud's syndrome       - continue routine prenatal care   - labor and rupture of membrane precautions provided  -Fetal movement instructions given    Return to clinic in  4 weeks for WALDO visit       Coel Hawkins MD   [facility] - OBGYN      Note to patient and family   The 21st Century Cures Act makes medical notes available to patients in the interest of transparency.  However, please be advised that this is a medical document.  It is intended as [id number] communication.  It is written and medical language may contain abbreviations or verbiage that are technical and unfamiliar.  It may appear blunt or direct.  Medical documents are intended to carry relevant information, facts as evident, and the clinical  opinion of the practitioner.

## 2024-12-20 NOTE — PATIENT INSTRUCTIONS
SCREENING FOR GESTATIONAL DIABETES    Pregnancy is a stress to your body.  One way in which the stress may manifest itself is through an abnormality in sugar metabolism.  In non-pregnant patients, this abnormality (a lack of insulin) is known as diabetes.    During pregnancy, the insulin normally secreted may become ineffective because of the various hormones and enzymes produced by the placenta.  This leads to a condition known as gestational diabetes, Class A diabetes, or pregnancy induced glucose intolerance (PIGI).  This condition disappears after delivery.  It is known to occur in approximately 5-10% of all pregnancies.  It is important because we know that increased maternal blood glucose (sugar) levels can cause fetal problems.  That is, the morbidity and mortality rates can be doubled among pregnancies complicated by undiagnosed gestational diabetes.    We screen all patients for gestational diabetes at around 24 weeks of pregnancy.  The reason we wait until this time is that the stresses of pregnancy leading to diabetes become maximal around this time, therefore, the chance of a false negative test is minimized.  If the diabetes is detected at this time, it gives us ample time to correct the glucose metabolism and hopefully, decrease  morbidity and mortality.    An order will be placed and the screening will be done in an outpatient lab facility.  Please call the lab to schedule an appointment for this testing, the outpatient labs do not take walk-ins due to the length of this test.  On the day of your test, we suggest you eat a normal breakfast, but eliminate pastries/doughnuts the morning of your test.  We also recommend that you do not eat or drink anything with sugar in it 2 hours prior to your test.   Upon arrival at the outpatient laboratory facility, you will be given a 50 gm carbohydrate drink.  A blood glucose level will then be drawn one hour after ingestion.    If the blood glucose value  is normal and no other risk factors exist, no further testing is necessary.  If the blood glucose is abnormal, this indicates an increased risk of gestational diabetes and will require a formal three hour glucose tolerance test.  This test will be done within two weeks following your abnormal one hour screen. Approximately 15% of patients with positive screening tests will have an abnormal three hour test glucose tolerance test.    If you have any questions regarding this testing, please feel free to contact us.  If gestational diabetes is detected, it will be thoroughly explained to you and discussion on management will take place with one of our physicians.    *If your preferred Lab is [facility], you may choose either the [place] location or the [facility], [address] for your testing.  You are also welcome to coordinate your lab appointment with your 24 week obstetric appointment so that you may spend your one hour wait time in our office rather than the lab facility.

## 2024-12-23 ENCOUNTER — ROUTINE PRENATAL (OUTPATIENT)
Dept: OBGYN CLINIC | Facility: CLINIC | Age: 39
End: 2024-12-23
Payer: COMMERCIAL

## 2024-12-23 VITALS
HEART RATE: 98 BPM | SYSTOLIC BLOOD PRESSURE: 148 MMHG | WEIGHT: 281.81 LBS | DIASTOLIC BLOOD PRESSURE: 72 MMHG | BODY MASS INDEX: 45.29 KG/M2 | HEIGHT: 66 IN

## 2024-12-23 DIAGNOSIS — N76.0 VAGINITIS AND VULVOVAGINITIS: ICD-10-CM

## 2024-12-23 DIAGNOSIS — Z34.90 ENCOUNTER FOR SUPERVISION OF NORMAL PREGNANCY, ANTEPARTUM, UNSPECIFIED GRAVIDITY (HCC): ICD-10-CM

## 2024-12-23 DIAGNOSIS — Z34.92 SECOND TRIMESTER PREGNANCY (HCC): Primary | ICD-10-CM

## 2024-12-23 PROBLEM — O16.9 ELEVATED BLOOD PRESSURE AFFECTING PREGNANCY, ANTEPARTUM (HCC): Status: ACTIVE | Noted: 2024-12-23

## 2024-12-23 PROCEDURE — 81514 NFCT DS BV&VAGINITIS DNA ALG: CPT | Performed by: OBSTETRICS & GYNECOLOGY

## 2024-12-24 LAB
BV BACTERIA DNA VAG QL NAA+PROBE: NEGATIVE
C GLABRATA DNA VAG QL NAA+PROBE: NEGATIVE
C KRUSEI DNA VAG QL NAA+PROBE: NEGATIVE
CANDIDA DNA VAG QL NAA+PROBE: NEGATIVE
T VAGINALIS DNA VAG QL NAA+PROBE: NEGATIVE

## 2025-01-14 ENCOUNTER — ROUTINE PRENATAL (OUTPATIENT)
Dept: OBGYN CLINIC | Facility: CLINIC | Age: 40
End: 2025-01-14
Payer: COMMERCIAL

## 2025-01-14 ENCOUNTER — LAB ENCOUNTER (OUTPATIENT)
Dept: LAB | Age: 40
End: 2025-01-14
Attending: OBSTETRICS & GYNECOLOGY
Payer: COMMERCIAL

## 2025-01-14 VITALS
SYSTOLIC BLOOD PRESSURE: 114 MMHG | WEIGHT: 286.13 LBS | DIASTOLIC BLOOD PRESSURE: 82 MMHG | HEART RATE: 92 BPM | BODY MASS INDEX: 45.99 KG/M2 | HEIGHT: 66 IN

## 2025-01-14 DIAGNOSIS — Z34.90 PRENATAL CARE, ANTEPARTUM (HCC): Primary | ICD-10-CM

## 2025-01-14 DIAGNOSIS — O16.9 ELEVATED BLOOD PRESSURE AFFECTING PREGNANCY, ANTEPARTUM (HCC): Primary | ICD-10-CM

## 2025-01-14 DIAGNOSIS — O16.9 ELEVATED BLOOD PRESSURE AFFECTING PREGNANCY, ANTEPARTUM (HCC): ICD-10-CM

## 2025-01-14 DIAGNOSIS — Z34.90 ENCOUNTER FOR SUPERVISION OF NORMAL PREGNANCY, ANTEPARTUM, UNSPECIFIED GRAVIDITY (HCC): ICD-10-CM

## 2025-01-14 DIAGNOSIS — O99.210 OBESITY AFFECTING PREGNANCY, ANTEPARTUM, UNSPECIFIED OBESITY TYPE (HCC): ICD-10-CM

## 2025-01-14 DIAGNOSIS — O09.529 ANTEPARTUM MULTIGRAVIDA OF ADVANCED MATERNAL AGE (HCC): ICD-10-CM

## 2025-01-14 LAB
ALBUMIN SERPL-MCNC: 3.8 G/DL (ref 3.2–4.8)
ALBUMIN/GLOB SERPL: 1.4 {RATIO} (ref 1–2)
ALP LIVER SERPL-CCNC: 65 U/L
ALT SERPL-CCNC: 10 U/L
ANION GAP SERPL CALC-SCNC: 9 MMOL/L (ref 0–18)
AST SERPL-CCNC: 13 U/L (ref ?–34)
BASOPHILS # BLD AUTO: 0.03 X10(3) UL (ref 0–0.2)
BASOPHILS NFR BLD AUTO: 0.3 %
BILIRUB SERPL-MCNC: 0.4 MG/DL (ref 0.3–1.2)
BUN BLD-MCNC: 6 MG/DL (ref 9–23)
CALCIUM BLD-MCNC: 9 MG/DL (ref 8.7–10.6)
CHLORIDE SERPL-SCNC: 109 MMOL/L (ref 98–112)
CO2 SERPL-SCNC: 19 MMOL/L (ref 21–32)
CREAT BLD-MCNC: 0.6 MG/DL
DEPRECATED HBV CORE AB SER IA-ACNC: 6 NG/ML
EGFRCR SERPLBLD CKD-EPI 2021: 116 ML/MIN/1.73M2 (ref 60–?)
EOSINOPHIL # BLD AUTO: 0.18 X10(3) UL (ref 0–0.7)
EOSINOPHIL NFR BLD AUTO: 1.7 %
ERYTHROCYTE [DISTWIDTH] IN BLOOD BY AUTOMATED COUNT: 13.5 %
GLOBULIN PLAS-MCNC: 2.7 G/DL (ref 2–3.5)
GLUCOSE 1H P GLC SERPL-MCNC: 130 MG/DL
GLUCOSE BLD-MCNC: 130 MG/DL (ref 70–99)
HCT VFR BLD AUTO: 30.6 %
HGB BLD-MCNC: 9.9 G/DL
IMM GRANULOCYTES # BLD AUTO: 0.04 X10(3) UL (ref 0–1)
IMM GRANULOCYTES NFR BLD: 0.4 %
LYMPHOCYTES # BLD AUTO: 1.81 X10(3) UL (ref 1–4)
LYMPHOCYTES NFR BLD AUTO: 17.2 %
MCH RBC QN AUTO: 27.7 PG (ref 26–34)
MCHC RBC AUTO-ENTMCNC: 32.4 G/DL (ref 31–37)
MCV RBC AUTO: 85.5 FL
MONOCYTES # BLD AUTO: 0.43 X10(3) UL (ref 0.1–1)
MONOCYTES NFR BLD AUTO: 4.1 %
NEUTROPHILS # BLD AUTO: 8.01 X10 (3) UL (ref 1.5–7.7)
NEUTROPHILS # BLD AUTO: 8.01 X10(3) UL (ref 1.5–7.7)
NEUTROPHILS NFR BLD AUTO: 76.3 %
OSMOLALITY SERPL CALC.SUM OF ELEC: 283 MOSM/KG (ref 275–295)
PLATELET # BLD AUTO: 307 10(3)UL (ref 150–450)
POTASSIUM SERPL-SCNC: 3.4 MMOL/L (ref 3.5–5.1)
PROT SERPL-MCNC: 6.5 G/DL (ref 5.7–8.2)
RBC # BLD AUTO: 3.58 X10(6)UL
SODIUM SERPL-SCNC: 137 MMOL/L (ref 136–145)
WBC # BLD AUTO: 10.5 X10(3) UL (ref 4–11)

## 2025-01-14 PROCEDURE — 82728 ASSAY OF FERRITIN: CPT | Performed by: OBSTETRICS & GYNECOLOGY

## 2025-01-14 PROCEDURE — 3008F BODY MASS INDEX DOCD: CPT | Performed by: OBSTETRICS & GYNECOLOGY

## 2025-01-14 PROCEDURE — 3074F SYST BP LT 130 MM HG: CPT | Performed by: OBSTETRICS & GYNECOLOGY

## 2025-01-14 PROCEDURE — 3079F DIAST BP 80-89 MM HG: CPT | Performed by: OBSTETRICS & GYNECOLOGY

## 2025-01-14 PROCEDURE — 85025 COMPLETE CBC W/AUTO DIFF WBC: CPT | Performed by: OBSTETRICS & GYNECOLOGY

## 2025-01-14 PROCEDURE — 80053 COMPREHEN METABOLIC PANEL: CPT | Performed by: OBSTETRICS & GYNECOLOGY

## 2025-01-14 PROCEDURE — 82950 GLUCOSE TEST: CPT | Performed by: OBSTETRICS & GYNECOLOGY

## 2025-01-14 NOTE — PROGRESS NOTES
[facility]  Obstetrics and Gynecology  Routine OB Visit Progress Note    Subjective:     Maynor Prater is a 39 year old  at 26w5d who presents for routine OB visit.  Patient reports doing well. Denies contractions, vaginal bleeding or leakage of fluid.  Good fetal movement.    RH +  S/P Anatomy scan   1 hr glucose and CBC (24-28wks) had done today; will await results  Discussed incomplete anatomy and repeat growth ultrasound; pt okay with getting scheduled at our clinic. Will plan on one at 28 weeks.     Objective:   /82   Pulse 92   Ht 66\"   Wt 286 lb 2 oz (129.8 kg)   BMI 46.18 kg/m²   ABD: gravid, nontender  FHT: 150   Fundal Height: 27     Assessment:     Maynor Prater is a 39 year old  at 26w5d who presents for routine OB visit. Overall doing well.     Problem List Items Addressed This Visit          Gravid and     AMA (advanced maternal age) multigravida 35+ (HCC)    Obesity affecting pregnancy, antepartum ([facility])     Other Visit Diagnoses       Prenatal care, antepartum ([facility])    -  Primary    Relevant Orders    US PREG GROWTH, FU OR REPEAT (CPT=76816)            Plan:     Patient Active Problem List    Diagnosis    Elevated blood pressure affecting pregnancy, antepartum ([facility])     - Mild range BP x1 148/72 at 23w4d  25 normal today      Late prenatal care ([facility])     Dating by 18 wk US.    - delivery by due date, antepartum testing and growth US's      Retained intrauterine contraceptive device (IUD)     Failed IUD.  Pulled at 14 wks of pregnancy      Obesity affecting pregnancy, antepartum ([facility])     [ x ] Early glucola- normal  - MFM, Level 2      Anxiety and depression     History.  With PTSD from marital problems, sudden loss of family member.  Briefly treated but not recently.      AMA (advanced maternal age) multigravida 35+ (HCC)     Declines aneuploidy screening  [ x ] MFM/Level 2- incomplete  [  ] BASA until 37 weeks   Follow-up in 2 to 3 weeks to complete  views  Monthly growth ultrasounds starting at 28 weeks with the addition of a BPP with each growth assessment 32 weeks and beyond  Weekly NST's at 32 weeks.  Twice weekly testing at 38 weeks - weekly NST and weekly BPP.  Delivery at 39-40 weeks.  She was advised to limit gestational weight gain to less than 20 pounds  25 pt declined to see MFM due to insurance; plan to f/u on ultrasound with our clinic; pt needs to schedule      Obesity (BMI 30-39.9)    Meningioma (HCC)     Ok for vaginal delivery. Consider Neuro if symptoms   BELOW FROM FIRST PREGNANCY-   5/21/15-- note from Dr Stoner-- 5-10mm meningioma incidental finding.  pt ok for   3/13 Saw Dr. dale at [place]. Had asked for consult letter to mailed to us. Will request again.  Pt will meet with Neuro to discuss recurrence of dizziness sx since off of Klonopin and Effexor and delivery mgmt.      Reaction to tuberculin skin test without active tuberculosis     Multiple negative CXR's      Raynaud's syndrome       - continue routine prenatal care   - labor and rupture of membrane precautions provided  - Fetal movement instructions given    Future Appointments   Date Time Provider Department Center   2025  5:00 PM Cole Hawkins MD [facility] [address]         Liz Hart MD   [facility] - OBGYN    Note to patient and family   The 21st Century Cures Act makes medical notes available to patients in the interest of transparency.  However, please be advised that this is a medical document.  It is intended as [id number] communication.  It is written and medical language may contain abbreviations or verbiage that are technical and unfamiliar.  It may appear blunt or direct.  Medical documents are intended to carry relevant information, facts as evident, and the clinical opinion of the practitioner.

## 2025-01-16 DIAGNOSIS — O99.012 MATERNAL IRON DEFICIENCY ANEMIA AFFECTING PREGNANCY IN SECOND TRIMESTER, ANTEPARTUM (HCC): ICD-10-CM

## 2025-01-16 DIAGNOSIS — D50.9 MATERNAL IRON DEFICIENCY ANEMIA AFFECTING PREGNANCY IN SECOND TRIMESTER, ANTEPARTUM (HCC): ICD-10-CM

## 2025-01-16 DIAGNOSIS — O99.810 PREGNANCY-INDUCED GLUCOSE INTOLERANCE (HCC): Primary | ICD-10-CM

## 2025-01-28 ENCOUNTER — LAB ENCOUNTER (OUTPATIENT)
Dept: LAB | Age: 40
End: 2025-01-28
Attending: OBSTETRICS & GYNECOLOGY
Payer: COMMERCIAL

## 2025-01-28 DIAGNOSIS — O99.810 PREGNANCY-INDUCED GLUCOSE INTOLERANCE (HCC): ICD-10-CM

## 2025-01-28 LAB
GLUCOSE 1H P GLC SERPL-MCNC: 126 MG/DL
GLUCOSE 2H P GLC SERPL-MCNC: 65 MG/DL
GLUCOSE 3H P GLC SERPL-MCNC: 65 MG/DL (ref 70–140)
GLUCOSE P FAST SERPL-MCNC: 104 MG/DL

## 2025-01-28 PROCEDURE — 82952 GTT-ADDED SAMPLES: CPT | Performed by: OBSTETRICS & GYNECOLOGY

## 2025-01-28 PROCEDURE — 82951 GLUCOSE TOLERANCE TEST (GTT): CPT | Performed by: OBSTETRICS & GYNECOLOGY

## 2025-01-29 ENCOUNTER — ROUTINE PRENATAL (OUTPATIENT)
Dept: OBGYN CLINIC | Facility: CLINIC | Age: 40
End: 2025-01-29
Payer: COMMERCIAL

## 2025-01-29 ENCOUNTER — APPOINTMENT (OUTPATIENT)
Dept: OBGYN CLINIC | Facility: CLINIC | Age: 40
End: 2025-01-29
Payer: COMMERCIAL

## 2025-01-29 VITALS
BODY MASS INDEX: 45.22 KG/M2 | DIASTOLIC BLOOD PRESSURE: 84 MMHG | WEIGHT: 281.38 LBS | SYSTOLIC BLOOD PRESSURE: 112 MMHG | HEART RATE: 93 BPM | HEIGHT: 66 IN

## 2025-01-29 DIAGNOSIS — D50.9 IRON DEFICIENCY ANEMIA, UNSPECIFIED IRON DEFICIENCY ANEMIA TYPE: ICD-10-CM

## 2025-01-29 DIAGNOSIS — O16.9 ELEVATED BLOOD PRESSURE AFFECTING PREGNANCY, ANTEPARTUM (HCC): ICD-10-CM

## 2025-01-29 DIAGNOSIS — O09.30 LATE PRENATAL CARE (HCC): ICD-10-CM

## 2025-01-29 DIAGNOSIS — O09.529 ANTEPARTUM MULTIGRAVIDA OF ADVANCED MATERNAL AGE (HCC): ICD-10-CM

## 2025-01-29 DIAGNOSIS — F41.9 ANXIETY AND DEPRESSION: ICD-10-CM

## 2025-01-29 DIAGNOSIS — R76.11 REACTION TO TUBERCULIN SKIN TEST WITHOUT ACTIVE TUBERCULOSIS: ICD-10-CM

## 2025-01-29 DIAGNOSIS — Z34.90 ENCOUNTER FOR SUPERVISION OF NORMAL PREGNANCY, ANTEPARTUM, UNSPECIFIED GRAVIDITY (HCC): Primary | ICD-10-CM

## 2025-01-29 DIAGNOSIS — O99.210 OBESITY AFFECTING PREGNANCY, ANTEPARTUM, UNSPECIFIED OBESITY TYPE (HCC): ICD-10-CM

## 2025-01-29 DIAGNOSIS — D32.9 MENINGIOMA (HCC): ICD-10-CM

## 2025-01-29 DIAGNOSIS — T83.39XA RETAINED INTRAUTERINE CONTRACEPTIVE DEVICE (IUD): ICD-10-CM

## 2025-01-29 DIAGNOSIS — Z34.90 PRENATAL CARE, ANTEPARTUM (HCC): ICD-10-CM

## 2025-01-29 DIAGNOSIS — F32.A ANXIETY AND DEPRESSION: ICD-10-CM

## 2025-01-29 PROCEDURE — 90471 IMMUNIZATION ADMIN: CPT | Performed by: STUDENT IN AN ORGANIZED HEALTH CARE EDUCATION/TRAINING PROGRAM

## 2025-01-29 PROCEDURE — 90715 TDAP VACCINE 7 YRS/> IM: CPT | Performed by: STUDENT IN AN ORGANIZED HEALTH CARE EDUCATION/TRAINING PROGRAM

## 2025-01-29 PROCEDURE — 3074F SYST BP LT 130 MM HG: CPT | Performed by: STUDENT IN AN ORGANIZED HEALTH CARE EDUCATION/TRAINING PROGRAM

## 2025-01-29 PROCEDURE — 3008F BODY MASS INDEX DOCD: CPT | Performed by: STUDENT IN AN ORGANIZED HEALTH CARE EDUCATION/TRAINING PROGRAM

## 2025-01-29 PROCEDURE — 3079F DIAST BP 80-89 MM HG: CPT | Performed by: STUDENT IN AN ORGANIZED HEALTH CARE EDUCATION/TRAINING PROGRAM

## 2025-01-29 NOTE — PROGRESS NOTES
WALDO    GA: 28w6d  Vitals:    25 1427   BP: 112/84   Pulse: 93   Weight: 281 lb 6.4 oz (127.6 kg)   Height: 66\"       Doing well, +FM  Denies LOF/VB/uctx  Discussed repeat Growth US today, notable for AC 91%ile, advised repeat US at 32 weeks with BPP  3T HIV and RPR ordered today  Rh positive, TDAP vaccine given today , Depression Scale         Assessment:     Maynor Prater is a 39 year old  at 28w6d who presents for routine OB visit. Overall doing well.     Problem List Items Addressed This Visit       Meningioma ([facility])    AMA (advanced maternal age) multigravida 35+ ([facility])    Late prenatal care ([facility])    Reaction to tuberculin skin test without active tuberculosis    Retained intrauterine contraceptive device (IUD)    Obesity affecting pregnancy, antepartum ([facility])    Anxiety and depression    Elevated blood pressure affecting pregnancy, antepartum ([facility])    Iron deficiency anemia     Other Visit Diagnoses       Encounter for supervision of normal pregnancy, antepartum, unspecified  ([facility])    -  Primary    AC >90%    Relevant Orders    US PREG GROWTH, FU OR REPEAT (CPT=76816)    HIV Ag/Ab Combo    RPR W/Conf    Prenatal care, antepartum ([facility])                    Plan:     Patient Active Problem List    Diagnosis    Iron deficiency anemia     [ ] Hematology       Elevated blood pressure affecting pregnancy, antepartum ([facility])     - Mild range BP x1 148/72 at 23w4d  25 normal today      Late prenatal care ([facility])     Dating by 18 wk US.    - delivery by due date, antepartum testing and growth US's      Retained intrauterine contraceptive device (IUD)     Failed IUD.  Pulled at 14 wks of pregnancy      Obesity affecting pregnancy, antepartum ([facility])     [ x ] Early glucola- normal  - MFM, Level 2  [x] Failed 1 hr, normal 3hr GTT      Anxiety and depression     History.  With PTSD from marital problems, sudden loss of family member.  Briefly treated but not recently.      AMA (advanced maternal  age) multigravida 35+ (HCC)     Declines aneuploidy screening  [ x ] MFM/Level 2- incomplete  [  ] BASA until 37 weeks   Follow-up in 2 to 3 weeks to complete views  Monthly growth ultrasounds starting at 28 weeks with the addition of a BPP with each growth assessment 32 weeks and beyond  Weekly NST's at 32 weeks.  Twice weekly testing at 38 weeks - weekly NST and weekly BPP.  Delivery at 39-40 weeks.  She was advised to limit gestational weight gain to less than 20 pounds  25 pt declined to see MFM due to insurance; plan to f/u on ultrasound with our clinic; pt needs to schedule      Meningioma (HCC)     Ok for vaginal delivery. Consider Neuro if symptoms   BELOW FROM FIRST PREGNANCY-   5/21/15-- note from Dr Stoner-- 5-10mm meningioma incidental finding.  pt ok for   3/13 Saw Dr. dale at [place]. Had asked for consult letter to mailed to us. Will request again.  Pt will meet with Neuro to discuss recurrence of dizziness sx since off of Klonopin and Effexor and delivery mgmt.      Reaction to tuberculin skin test without active tuberculosis     Multiple negative CXR's         - continue routine prenatal care   - labor and rupture of membrane precautions provided  - Fetal movement instructions given    Return to clinic in 2 weeks for WALDO visit     Jennifer Pretty DO  [facility] - OBGYN    Note to patient and family   The 21st Century Cures Act makes medical notes available to patients in the interest of transparency.  However, please be advised that this is a medical document.  It is intended as ifsh-gh-pgiu communication.  It is written and medical language may contain abbreviations or verbiage that are technical and unfamiliar.  It may appear blunt or direct.  Medical documents are intended to carry relevant information, facts as evident, and the clinical opinion of the practitioner.

## 2025-01-29 NOTE — PATIENT INSTRUCTIONS
Labor Instructions    How do I know if it’s true labor?  One of the most important aspects of any pregnancy is being able to recognize the onset of labor.  Unfortunately, on occasion it can be difficult or confusing, especially if you have had one or more children.  Each labor can begin in a different way even if you have had four or five children.    If this is your first child, it is very common to have labor for an average greater than 10 hours; however, there have been rare instances for labor to be two hours or less for a first time mother. It is more important for you to know if this is your second or third baby to realize that any labor after the first is usually shorter.  There is no way to tell how long or short the labor will be. Therefore, please call us if you are unsure labor has started.       Usually, during the last six weeks of pregnancy, Guille-Flynn contractions or “false labor pains occur”.  False labor is generally not very painful though it is not always easy to tell.  You may feel contractions, cramps or uterine tightening somewhere between every 3-30 minutes but they will not continue to get stronger over time.  If you lie down, drink plenty of fluid or walk around, the contractions may go away.   False contractions are very common if you have been active on your feet for several hours.   Women frequently worry about being sent home from the hospital without having their baby (i.e. the labor stopped).  Actually, this is an unnecessary worry, for this is an infrequent occurrence.     True labor usually begins in one of two ways.  In most patients it begins with contractions of the uterus, which are irregular (but not always) in the beginning.  They are cramp-like in character and feel similar to menstrual cramps.  After a while, they become more regular, and they seem to last a little longer, and feel a little sharper.  These symptoms are very important-more important- than the timing of the  contractions.  Having regular (usually closer), longer lasting (35-70 seconds), and sharper (more painful) contractions are the common symptoms of actual labor.  The second way in which labor can begin which occurs in approximately 30% of all patients is the rupture of the bag of water.  This is a sudden gush of watery fluid, usually sufficient to run down your leg and onto the floor, or you may wet a large area of the bed if it happens at night.  There may also be tricking that is uncontrolled.  If you are unsure, please call the office.      When should I call?  When contractions are strong and every 3-5 minutes.  If you have a gush of water or you think you might be leaking fluid.  If you are bleeding heavily.  If your baby is not moving around at least every 1-2 hours.  If you are worried about something.  When you think you are ready to go to the hospital.    Who do I call?  Call the office at [phone].  If the office is closed, the answering service will send a message to the physician on call.  The on call physician will be available for emergency phone calls only.          Can I eat in labor?  It is good to eat a light meal at home before going to the hospital.  Eat foods like crackers, popsicles, soup and fruit.  Avoid foods that are difficult to digest like meat, a lot of dairy products and high fat foods.  DO NOT EAT if you know you are scheduled for a  ().      What will happen when I get to the hospital?  When you arrive at the hospital, you will be admitted and examined.   There are a few factors that will determine if you will be allowed to be up out of bed or if you would need to stay in bed.  The main factors are how well the baby is entering into the pelvis and if the bag of rivas is in intact or ruptured.  An intravenous (IV) solution will, with exceptions, be started.  This is extremely important especially for the baby.   Your  will be allowed in the room during your  labor. During the delivery, the nurses will inform you of the hospital policy and how many coaches are allowed.  You may desire pain medication or anesthesia for pain.  You probably discussed some aspects of pain medication with us during your prenatal care.  The various options may also have been discussed in Prenatal Classes.  We utilize IV narcotics and epidural anesthesia when our patients request to have them.  If you chose to have no anesthesia, none will be administered.  A local anesthetic may be used at the time of delivery      What should I to bring to the hospital?  Maternity clothes to go home in  You can bring your own night gown to wear after giving birth, but most women prefer to wear the hospital gown because it may get soiled  Nursing Bra if you are planning to breastfeed  Clothes for your baby to go home in  Baby Car Seat.  Be sure you know how to install it correctly. Please install it before going to the hospital  Routine toiletries like toothbrush, shampoo, hairbrush and etc.   You can bring your favorite pillow, but please put a colored pillow case on it so it doesn’t get mixed up with hospital pillows    How long will I stay in the hospital?  The date you leave the hospital may vary depending on the speed of your recovery.  If you have a vaginal delivery, you will stay in the hospital 24-48 hours after your delivery as long as you aren’t having any complications.    If you have had a , you will stay in the hospital 48-72 hours as long as you aren’t having any complications.       Going Home Instructions  There are no set rules as to what you may do each day or week after you are home.  You will receive discharge instructions to help you each day.  Remember, early ambulation in the hospital is to prevent complications.  Do not let this lull you into a false sense of strength or ability to do certain physical acts which may tire you excessively.  Please call the office within a few days  after you are discharged from the hospital to schedule your post-partum visit, which is usually 4-6 weeks after delivery.    Any medications necessary will be discussed on an individual basis.  If you decide to breastfeed your baby, you should continue your prenatal vitamins.  If you do not breastfeed, simply finish the prenatal vitamins you have.      The staff at [facility] wish you a joyous and exciting birth.  If there is anything we can do to make this a better experience for you please do not hesitate to ask.

## 2025-02-11 ENCOUNTER — ROUTINE PRENATAL (OUTPATIENT)
Dept: OBGYN CLINIC | Facility: CLINIC | Age: 40
End: 2025-02-11
Payer: COMMERCIAL

## 2025-02-11 ENCOUNTER — LAB ENCOUNTER (OUTPATIENT)
Dept: LAB | Age: 40
End: 2025-02-11
Attending: STUDENT IN AN ORGANIZED HEALTH CARE EDUCATION/TRAINING PROGRAM
Payer: COMMERCIAL

## 2025-02-11 ENCOUNTER — TELEPHONE (OUTPATIENT)
Dept: OBGYN CLINIC | Facility: CLINIC | Age: 40
End: 2025-02-11

## 2025-02-11 VITALS
DIASTOLIC BLOOD PRESSURE: 72 MMHG | BODY MASS INDEX: 45 KG/M2 | SYSTOLIC BLOOD PRESSURE: 118 MMHG | WEIGHT: 280.19 LBS | HEART RATE: 105 BPM

## 2025-02-11 DIAGNOSIS — O99.210 OBESITY AFFECTING PREGNANCY, ANTEPARTUM, UNSPECIFIED OBESITY TYPE (HCC): ICD-10-CM

## 2025-02-11 DIAGNOSIS — Z34.90 ENCOUNTER FOR SUPERVISION OF NORMAL PREGNANCY, ANTEPARTUM, UNSPECIFIED GRAVIDITY (HCC): ICD-10-CM

## 2025-02-11 DIAGNOSIS — O09.529 ANTEPARTUM MULTIGRAVIDA OF ADVANCED MATERNAL AGE (HCC): ICD-10-CM

## 2025-02-11 DIAGNOSIS — Z3A.30 30 WEEKS GESTATION OF PREGNANCY (HCC): Primary | ICD-10-CM

## 2025-02-11 PROCEDURE — 3074F SYST BP LT 130 MM HG: CPT | Performed by: NURSE PRACTITIONER

## 2025-02-11 PROCEDURE — 3078F DIAST BP <80 MM HG: CPT | Performed by: NURSE PRACTITIONER

## 2025-02-11 PROCEDURE — 86592 SYPHILIS TEST NON-TREP QUAL: CPT

## 2025-02-11 PROCEDURE — 87389 HIV-1 AG W/HIV-1&-2 AB AG IA: CPT

## 2025-02-11 PROCEDURE — 36415 COLL VENOUS BLD VENIPUNCTURE: CPT

## 2025-02-11 NOTE — TELEPHONE ENCOUNTER
----- Message from Tea Saxena sent at 2/11/2025  2:12 PM CST -----  Regarding: IOL  Request IOL at/between this GA: 39 wks  Estimated Date of Delivery: 4/17/25  Indication for IOL:  elective    Cervical ripening with cytotec: yes  IOL with pitocin: yes, per protocol   OB history/complications: Meningioma (HCC)     AMA (advanced maternal age) multigravida 35+ ([facility])    Late prenatal care ([facility])    Reaction to tuberculin skin test without active tuberculosis    Retained intrauterine contraceptive device (IUD)    Obesity affecting pregnancy, antepartum ([facility])    Anxiety and depression    Elevated blood pressure affecting pregnancy, antepartum ([facility])    Iron deficiency anemia

## 2025-02-11 NOTE — PROGRESS NOTES
WALDO 30w5d    Doing well, +FM  Denies contractions  Denies LOF, VB      FHT-P  PNL:  3T labs drawn today  Mode of delivery: anticipate , pt desire 39 week IOL   Immunizations: s/p TDAP  AMA/Obesity: growth US 32 weeks, weekly NST 32 weeks, twice weekly  testing 38 weeks   labor precautions reviewed  Fetal movement discussed    Return in 2 weeks

## 2025-02-13 LAB — RPR SER QL: NONREACTIVE

## 2025-02-27 ENCOUNTER — APPOINTMENT (OUTPATIENT)
Dept: OBGYN CLINIC | Facility: CLINIC | Age: 40
End: 2025-02-27

## 2025-02-27 ENCOUNTER — ROUTINE PRENATAL (OUTPATIENT)
Dept: OBGYN CLINIC | Facility: CLINIC | Age: 40
End: 2025-02-27
Payer: COMMERCIAL

## 2025-02-27 VITALS
WEIGHT: 286 LBS | DIASTOLIC BLOOD PRESSURE: 60 MMHG | HEIGHT: 66 IN | HEART RATE: 94 BPM | BODY MASS INDEX: 45.96 KG/M2 | SYSTOLIC BLOOD PRESSURE: 110 MMHG

## 2025-02-27 DIAGNOSIS — Z34.90 ENCOUNTER FOR SUPERVISION OF NORMAL PREGNANCY, ANTEPARTUM, UNSPECIFIED GRAVIDITY (HCC): ICD-10-CM

## 2025-02-27 PROCEDURE — 3008F BODY MASS INDEX DOCD: CPT | Performed by: OBSTETRICS & GYNECOLOGY

## 2025-02-27 PROCEDURE — 3074F SYST BP LT 130 MM HG: CPT | Performed by: OBSTETRICS & GYNECOLOGY

## 2025-02-27 PROCEDURE — 3078F DIAST BP <80 MM HG: CPT | Performed by: OBSTETRICS & GYNECOLOGY

## 2025-02-27 NOTE — PROGRESS NOTES
WALDO  39 year old yo, , at GA 33w0d    Vitals:    25 1546   BP: 110/60   Pulse: 94   Weight: 286 lb (129.7 kg)   Height: 66\"       Doing well, +FM  Denies LOF/VB/uctx  Rh +, TDAP received    Growth ultrasound completed today - 77%ile, HC 95.4%, AC 94.6%ile   BPP       Assessment:     Maynor Prater is a 39 year old  at 33w0d who presents for routine OB visit. Overall doing well.     Problem List Items Addressed This Visit    None  Visit Diagnoses       Encounter for supervision of normal pregnancy, antepartum, unspecified  (HCC)        AC >90%                Plan:     Patient Active Problem List    Diagnosis    Iron deficiency anemia     [ ] Hematology       Elevated blood pressure affecting pregnancy, antepartum ([facility])     - Mild range BP x1 148/72 at 23w4d  25 normal today      Late prenatal care ([facility])     Dating by 18 wk US.    - delivery by due date, antepartum testing and growth US's      Retained intrauterine contraceptive device (IUD)     Failed IUD.  Pulled at 14 wks of pregnancy      Obesity affecting pregnancy, antepartum ([facility])     [ x ] Early glucola- normal  - MFM, Level 2  [x] Failed 1 hr, normal 3hr GTT      Anxiety and depression     History.  With PTSD from marital problems, sudden loss of family member.  Briefly treated but not recently.      AMA (advanced maternal age) multigravida 35+ ([facility])     Declines aneuploidy screening  [ x ] MFM/Level 2- incomplete  [  ] BASA until 37 weeks   Follow-up in 2 to 3 weeks to complete views  Monthly growth ultrasounds starting at 28 weeks with the addition of a BPP with each growth assessment 32 weeks and beyond  Weekly NST's at 32 weeks.  Twice weekly testing at 38 weeks - weekly NST and weekly BPP.  Delivery at 39-40 weeks.  She was advised to limit gestational weight gain to less than 20 pounds  25 pt declined to see MFM due to insurance; plan to f/u on ultrasound with our clinic; pt needs to schedule      Meningioma  ([facility])     Ok for vaginal delivery. Consider Neuro if symptoms   BELOW FROM FIRST PREGNANCY-   5/21/15-- note from Dr Stoner-- 5-10mm meningioma incidental finding.  pt ok for   3/13 Saw Dr. dale at [place]. Had asked for consult letter to mailed to us. Will request again.  Pt will meet with Neuro to discuss recurrence of dizziness sx since off of Klonopin and Effexor and delivery mgmt.      Reaction to tuberculin skin test without active tuberculosis     Multiple negative CXR's         - continue routine prenatal care   - labor and rupture of membrane precautions provided  - Fetal movement instructions given    Return to clinic in 2 weeks for WALDO visit       Cole Hawkins MD   [facility] - OBGYN      Note to patient and family   The 21st Century Cures Act makes medical notes available to patients in the interest of transparency.  However, please be advised that this is a medical document.  It is intended as [id number]-eorv communication.  It is written and medical language may contain abbreviations or verbiage that are technical and unfamiliar.  It may appear blunt or direct.  Medical documents are intended to carry relevant information, facts as evident, and the clinical opinion of the practitioner.

## 2025-02-27 NOTE — PATIENT INSTRUCTIONS
KICK COUNT INSTRUCTIONS    After 28 weeks of pregnancy, we would like for you to monitor your baby’s movement daily by doing kick counts, an easy way for you to assess your baby’s well-being.    The two main ways to track fetal kicks are:  Count the number of kicks you feel in a one-hour period.  Measure the amount of time it takes for the fetus to kick 10 times.    How to count kicks:  - Choose a time when the baby is active, such as after a meal.  - Preferably, we recommend monitoring fetal movements around the same time each day.   - You can use a free cisco such as 'Count the Kicks' - https://countthekicks.org/   - Sit comfortably or lie on your side, and count the number of movements in an hour.   - The evening hours seem to be the most convenient time to do this test, although you can also do this test anytime you are concerned about the baby’s movement.    The American Congress of Obstetricians and Gynecologists (ACOG) recommends that you time how long it takes you to feel 10 kicks, flutters, swishes or rolls. Ideally, you want to feel 10 movements within two hours.    How to wake up baby:   Try not to be alarmed if the fetus hasn’t moved in several hours. It’s normal for fetuses to have periods of rest, sleep and activity -- just like you.  - Taking a walk or moving your body.  - Drinking juice or another sweet beverage.  - Eating a meal.  - Lying down on your left side (this maximizes blood flow).  - Playing loud music.    When to contact the office:   Call the office right away at [phone] if you notice any of the following:  - Your baby moves less than 10 times in two hours while you are doing kick counts.  - Your baby moves much less often than on the days before.  - You have not felt your baby move all day.

## 2025-03-06 ENCOUNTER — ROUTINE PRENATAL (OUTPATIENT)
Dept: OBGYN CLINIC | Facility: CLINIC | Age: 40
End: 2025-03-06
Payer: COMMERCIAL

## 2025-03-06 ENCOUNTER — APPOINTMENT (OUTPATIENT)
Dept: OBGYN CLINIC | Facility: CLINIC | Age: 40
End: 2025-03-06
Payer: COMMERCIAL

## 2025-03-06 VITALS
DIASTOLIC BLOOD PRESSURE: 74 MMHG | BODY MASS INDEX: 45.56 KG/M2 | HEIGHT: 66 IN | WEIGHT: 283.5 LBS | SYSTOLIC BLOOD PRESSURE: 116 MMHG | HEART RATE: 107 BPM

## 2025-03-06 DIAGNOSIS — O99.210 OBESITY AFFECTING PREGNANCY, ANTEPARTUM, UNSPECIFIED OBESITY TYPE (HCC): ICD-10-CM

## 2025-03-06 DIAGNOSIS — Z34.90 ENCOUNTER FOR SUPERVISION OF NORMAL PREGNANCY, ANTEPARTUM, UNSPECIFIED GRAVIDITY (HCC): Primary | ICD-10-CM

## 2025-03-06 DIAGNOSIS — O09.529 ANTEPARTUM MULTIGRAVIDA OF ADVANCED MATERNAL AGE (HCC): ICD-10-CM

## 2025-03-06 PROCEDURE — 3074F SYST BP LT 130 MM HG: CPT | Performed by: NURSE PRACTITIONER

## 2025-03-06 PROCEDURE — 3078F DIAST BP <80 MM HG: CPT | Performed by: NURSE PRACTITIONER

## 2025-03-06 PROCEDURE — 59025 FETAL NON-STRESS TEST: CPT | Performed by: NURSE PRACTITIONER

## 2025-03-06 PROCEDURE — 3008F BODY MASS INDEX DOCD: CPT | Performed by: NURSE PRACTITIONER

## 2025-03-06 NOTE — PROGRESS NOTES
WALDO  Doing well, no concerns or questions  GOOD FM  Denies VB/LOF/uctx  RTC in 1 wk with NST  NST was reactive  Fetal movement

## 2025-03-12 ENCOUNTER — ROUTINE PRENATAL (OUTPATIENT)
Dept: OBGYN CLINIC | Facility: CLINIC | Age: 40
End: 2025-03-12
Payer: COMMERCIAL

## 2025-03-12 ENCOUNTER — APPOINTMENT (OUTPATIENT)
Dept: OBGYN CLINIC | Facility: CLINIC | Age: 40
End: 2025-03-12
Payer: COMMERCIAL

## 2025-03-12 VITALS
SYSTOLIC BLOOD PRESSURE: 124 MMHG | BODY MASS INDEX: 44.39 KG/M2 | WEIGHT: 276.25 LBS | HEIGHT: 66 IN | DIASTOLIC BLOOD PRESSURE: 70 MMHG

## 2025-03-12 DIAGNOSIS — O09.529 ANTEPARTUM MULTIGRAVIDA OF ADVANCED MATERNAL AGE (HCC): ICD-10-CM

## 2025-03-12 DIAGNOSIS — O99.210 OBESITY AFFECTING PREGNANCY, ANTEPARTUM, UNSPECIFIED OBESITY TYPE (HCC): ICD-10-CM

## 2025-03-12 DIAGNOSIS — Z3A.34 34 WEEKS GESTATION OF PREGNANCY (HCC): Primary | ICD-10-CM

## 2025-03-12 DIAGNOSIS — O16.9 ELEVATED BLOOD PRESSURE AFFECTING PREGNANCY, ANTEPARTUM (HCC): ICD-10-CM

## 2025-03-12 PROCEDURE — 59025 FETAL NON-STRESS TEST: CPT | Performed by: NURSE PRACTITIONER

## 2025-03-12 PROCEDURE — 3078F DIAST BP <80 MM HG: CPT | Performed by: NURSE PRACTITIONER

## 2025-03-12 PROCEDURE — 3074F SYST BP LT 130 MM HG: CPT | Performed by: NURSE PRACTITIONER

## 2025-03-12 PROCEDURE — 3008F BODY MASS INDEX DOCD: CPT | Performed by: NURSE PRACTITIONER

## 2025-03-12 NOTE — PROGRESS NOTES
WALDO 34w6d    Doing well, +FM  Denies consistent contractions  Denies LOF, VB      FHT-NST reactive  PNL:  Negative 3T labs  Mode of delivery: anticipate , IOL scheduled 4/10/2025   Immunizations: s/p TDAP  AMA/CHTN: monthly growth US, weekly NST 32 weeks, twice weekly testing 38 weeks   labor precautions reviewed  Fetal movement discussed    Return in 1 weeks

## 2025-03-18 ENCOUNTER — ROUTINE PRENATAL (OUTPATIENT)
Dept: OBGYN CLINIC | Facility: CLINIC | Age: 40
End: 2025-03-18
Payer: COMMERCIAL

## 2025-03-18 ENCOUNTER — APPOINTMENT (OUTPATIENT)
Dept: OBGYN CLINIC | Facility: CLINIC | Age: 40
End: 2025-03-18
Payer: COMMERCIAL

## 2025-03-18 ENCOUNTER — TELEPHONE (OUTPATIENT)
Dept: OBGYN CLINIC | Facility: CLINIC | Age: 40
End: 2025-03-18

## 2025-03-18 VITALS
HEIGHT: 66 IN | DIASTOLIC BLOOD PRESSURE: 76 MMHG | BODY MASS INDEX: 45.38 KG/M2 | HEART RATE: 98 BPM | WEIGHT: 282.38 LBS | SYSTOLIC BLOOD PRESSURE: 114 MMHG

## 2025-03-18 DIAGNOSIS — O99.210 OBESITY AFFECTING PREGNANCY, ANTEPARTUM, UNSPECIFIED OBESITY TYPE (HCC): ICD-10-CM

## 2025-03-18 DIAGNOSIS — Z34.90 PRENATAL CARE, ANTEPARTUM (HCC): Primary | ICD-10-CM

## 2025-03-18 DIAGNOSIS — O09.30 LATE PRENATAL CARE (HCC): ICD-10-CM

## 2025-03-18 DIAGNOSIS — O09.523 MULTIGRAVIDA OF ADVANCED MATERNAL AGE IN THIRD TRIMESTER (HCC): Primary | ICD-10-CM

## 2025-03-18 DIAGNOSIS — O09.523 MULTIGRAVIDA OF ADVANCED MATERNAL AGE IN THIRD TRIMESTER (HCC): ICD-10-CM

## 2025-03-18 PROCEDURE — 59025 FETAL NON-STRESS TEST: CPT | Performed by: OBSTETRICS & GYNECOLOGY

## 2025-03-18 PROCEDURE — 3008F BODY MASS INDEX DOCD: CPT | Performed by: OBSTETRICS & GYNECOLOGY

## 2025-03-18 PROCEDURE — 3078F DIAST BP <80 MM HG: CPT | Performed by: OBSTETRICS & GYNECOLOGY

## 2025-03-18 PROCEDURE — 3074F SYST BP LT 130 MM HG: CPT | Performed by: OBSTETRICS & GYNECOLOGY

## 2025-03-18 NOTE — TELEPHONE ENCOUNTER
35w5d LOV 03/18. Needs a growth US next week. No availability in office. Outpt order suggested (they are scheduling out to April) Patient declined due to out of pocket cost. Patient also needs BPP the week of 03/31, spaced out 3 to 4 days between her NST visit. PSR offered Tues. 04/01, BPP and NST to Fri, 04/04. Again patient declined, as she works Fri, Sat, and Sun. Please advise

## 2025-03-18 NOTE — TELEPHONE ENCOUNTER
Patient was in for an appointment on 03/18 with Dr. Yates on 03/18 and needs a growth US next week (Anca is out of the office), suggested an order to be provided for an outpatient US, the patient refused stating that due to the billing and the cost out of pocket, she cannot do this.  The patient also needs a BPP US the following week beginning 03/31 and it needs to be spaced out 3 to 4 days between her NST visit-offered the pt 04/01 (Tuesday) for US BPP and would move her NST visit to 04/04 (Friday) and again patient refused stating that she works Fri, Sat, and Sun and would not be able to accept the appointments.  Please advise patient as she is not in compliance with appointments and refused to schedule.    Thank you.

## 2025-03-19 NOTE — TELEPHONE ENCOUNTER
Okay to keep appointments as scheduled  Pt should also have appointment scheduled for BPP and WALDO between 4/3/2025 visit and IOL

## 2025-03-19 NOTE — TELEPHONE ENCOUNTER
Was able to get pt in at Jhoan's office for growth ultrasound next week and bpp on 4/1 here w/ our office. Pt however cannot come Friday/Saturday to allow the 3-4 day space bettween these appts. Is pt ok to keep the appt on 4/3 (with only 2 day space)?    Future Appointments   Date Time Provider Department Center   3/26/2025 11:00 AM [facility] OB ULTRASOUND [facility] [place]   3/26/2025 12:45 PM Tea Saxena APRN [facility] [place]   4/1/2025  9:00 AM [facility] US [place] [facility] [place]   4/1/2025 10:15 AM Amadeo Yates MD [facility] [place]   4/3/2025  9:45 AM [facility] NURSE [place] [facility] [place]   4/3/2025 10:15 AM Jennifer Pretty DO [facility] [place]   4/10/2025  7:30 PM L & D ROOM A [facility] OUT[facility]   4/11/2025  7:30 AM L & D ROOM A [facility]

## 2025-03-19 NOTE — TELEPHONE ENCOUNTER
Im working with other ob office and sandeep to try to accommodate to best of our availability. Will contact pt when I have dates confirmed.

## 2025-03-26 ENCOUNTER — ULTRASOUND ENCOUNTER (OUTPATIENT)
Facility: CLINIC | Age: 40
End: 2025-03-26
Payer: COMMERCIAL

## 2025-03-26 ENCOUNTER — ROUTINE PRENATAL (OUTPATIENT)
Dept: OBGYN CLINIC | Facility: CLINIC | Age: 40
End: 2025-03-26
Payer: COMMERCIAL

## 2025-03-26 VITALS
BODY MASS INDEX: 45.4 KG/M2 | SYSTOLIC BLOOD PRESSURE: 122 MMHG | HEIGHT: 66 IN | WEIGHT: 282.5 LBS | DIASTOLIC BLOOD PRESSURE: 76 MMHG

## 2025-03-26 DIAGNOSIS — O99.210 OBESITY AFFECTING PREGNANCY, ANTEPARTUM, UNSPECIFIED OBESITY TYPE (HCC): ICD-10-CM

## 2025-03-26 DIAGNOSIS — O09.523 MULTIGRAVIDA OF ADVANCED MATERNAL AGE IN THIRD TRIMESTER (HCC): ICD-10-CM

## 2025-03-26 DIAGNOSIS — Z3A.36 36 WEEKS GESTATION OF PREGNANCY (HCC): Primary | ICD-10-CM

## 2025-03-26 DIAGNOSIS — O09.30 LATE PRENATAL CARE (HCC): ICD-10-CM

## 2025-03-26 DIAGNOSIS — Z36.85 ANTENATAL SCREENING FOR STREPTOCOCCUS B (HCC): ICD-10-CM

## 2025-03-26 PROCEDURE — 87081 CULTURE SCREEN ONLY: CPT | Performed by: NURSE PRACTITIONER

## 2025-03-26 PROCEDURE — 87150 DNA/RNA AMPLIFIED PROBE: CPT | Performed by: NURSE PRACTITIONER

## 2025-03-26 PROCEDURE — 3078F DIAST BP <80 MM HG: CPT | Performed by: NURSE PRACTITIONER

## 2025-03-26 PROCEDURE — 3074F SYST BP LT 130 MM HG: CPT | Performed by: NURSE PRACTITIONER

## 2025-03-26 PROCEDURE — 3008F BODY MASS INDEX DOCD: CPT | Performed by: NURSE PRACTITIONER

## 2025-03-26 NOTE — TELEPHONE ENCOUNTER
Future Appointments   Date Time Provider Department Center   4/1/2025  9:00 AM [facility] US [facility] [place]   4/1/2025 10:15 AM Amadeo Yates MD [facility] [place]   4/3/2025  9:45 AM [facility] NURSE [place] [facility] [place]   4/8/2025  2:00 PM [facility] US [facility] [place]   4/8/2025  2:30 PM Jennifer Pretty DO [facility] [place]   4/10/2025  7:30 PM L & D ROOM A  OB [facility]   4/11/2025  7:30 AM L & D ROOM A  [facility]     Not sure if orders are needed for upcoming ultrasounds.

## 2025-03-26 NOTE — PROGRESS NOTES
WALDO 36w6d    Doing well, +FM  Denies contractions  Denies LOF, VB    Growth US completed at [place] [facility]  Vertex presentation, EFW 3343 (80.8%)  MURALI 10.43      FHT-P  PNL:  GBS today  Mode of delivery: anticipate , IOL scheduled 4/10/2025   Immunizations: s/p TDAP  AMA/late PNC/obesity: monthly growth, weekly NST, twice weekly NST/BPP 38 weeks  Labor precautions reviewed  Fetal movement discussed    Return in 1 weeks

## 2025-03-26 NOTE — TELEPHONE ENCOUNTER
Opening for bpp/ mariano saved in our npv loc for 4/8/25 at 2 pm. Will confirm with pt when she comes in for appt today.    Future Appointments   Date Time Provider Department Center   3/26/2025 11:00 AM [facility] OB ULTRASOUND [facility] [place]   3/26/2025 12:45 PM Tea Saxena APRN [facility] [place]   4/1/2025  9:00 AM [facility] OB US [facility] [place]   4/1/2025 10:15 AM Amadeo Yates MD [facility] [place]   4/3/2025  9:45 AM [facility] OB NURSE [place] [facility] [place]   4/10/2025  7:30 PM L & D ROOM A [facility]   4/11/2025  7:30 AM L & D ROOM A [facility]        Siliq Counseling:  I discussed with the patient the risks of Siliq including but not limited to new or worsening depression, suicidal thoughts and behavior, immunosuppression, malignancy, posterior leukoencephalopathy syndrome, and serious infections.  The patient understands that monitoring is required including a PPD at baseline and must alert us or the primary physician if symptoms of infection or other concerning signs are noted. There is also a special program designed to monitor depression which is required with Siliq.

## 2025-03-28 LAB — GROUP B STREP BY PCR FOR PCR OVT: POSITIVE

## 2025-03-31 PROBLEM — B95.1 GROUP B STREPTOCOCCAL INFECTION IN PREGNANCY (HCC): Status: ACTIVE | Noted: 2025-03-31

## 2025-03-31 PROBLEM — O98.819 GROUP B STREPTOCOCCAL INFECTION IN PREGNANCY (HCC): Status: ACTIVE | Noted: 2025-03-31

## 2025-04-01 ENCOUNTER — ULTRASOUND ENCOUNTER (OUTPATIENT)
Dept: OBGYN CLINIC | Facility: CLINIC | Age: 40
End: 2025-04-01
Payer: COMMERCIAL

## 2025-04-01 ENCOUNTER — ROUTINE PRENATAL (OUTPATIENT)
Dept: OBGYN CLINIC | Facility: CLINIC | Age: 40
End: 2025-04-01
Payer: COMMERCIAL

## 2025-04-01 VITALS
SYSTOLIC BLOOD PRESSURE: 124 MMHG | DIASTOLIC BLOOD PRESSURE: 72 MMHG | WEIGHT: 280 LBS | HEIGHT: 66 IN | HEART RATE: 111 BPM | BODY MASS INDEX: 45 KG/M2

## 2025-04-01 DIAGNOSIS — O09.523 MULTIGRAVIDA OF ADVANCED MATERNAL AGE IN THIRD TRIMESTER (HCC): ICD-10-CM

## 2025-04-01 DIAGNOSIS — B95.1 POSITIVE GBS TEST: Primary | ICD-10-CM

## 2025-04-01 DIAGNOSIS — Z34.80 PRENATAL CARE, SUBSEQUENT PREGNANCY, ANTEPARTUM (HCC): Primary | ICD-10-CM

## 2025-04-01 PROCEDURE — 76819 FETAL BIOPHYS PROFIL W/O NST: CPT | Performed by: OBSTETRICS & GYNECOLOGY

## 2025-04-01 PROCEDURE — 3074F SYST BP LT 130 MM HG: CPT | Performed by: OBSTETRICS & GYNECOLOGY

## 2025-04-01 PROCEDURE — 3078F DIAST BP <80 MM HG: CPT | Performed by: OBSTETRICS & GYNECOLOGY

## 2025-04-01 PROCEDURE — 3008F BODY MASS INDEX DOCD: CPT | Performed by: OBSTETRICS & GYNECOLOGY

## 2025-04-01 NOTE — PROGRESS NOTES
Chief Complaint   Patient presents with    Prenatal Care     WALDO 37.5 weeks   Declined cervical check      Routine prenatal visit without complaints.  Patient denies any bleeding, leaking fluid, cramping, or regular uterine contractions. Good fetal movement.  SVE:   US BPP 8/8 MURALI 11 vertex  Assessment/Plan:  37w5d doing well  GBS pos  AMA/Obesity/Late PNC- NST only 3-4 days, WALDO and BPP 1 wk.  Cytotec IOL scheduled for 4/10/25 7:30 pm  Kick counts reviewed.  Reviewed labor signs and symptoms.  Diagnoses and all orders for this visit:    Prenatal care, subsequent pregnancy, antepartum (HCC)    Multigravida of advanced maternal age in third trimester (HCC)  -     Fetal Biphysical Profile w/o NST [05968]       Return for NST only 3-4 days, WALDO and BPP US 1 wk.

## 2025-04-02 ENCOUNTER — TELEPHONE (OUTPATIENT)
Dept: OBGYN UNIT | Facility: HOSPITAL | Age: 40
End: 2025-04-02

## 2025-04-03 ENCOUNTER — NURSE ONLY (OUTPATIENT)
Dept: OBGYN CLINIC | Facility: CLINIC | Age: 40
End: 2025-04-03
Payer: COMMERCIAL

## 2025-04-03 VITALS
DIASTOLIC BLOOD PRESSURE: 64 MMHG | HEIGHT: 66 IN | BODY MASS INDEX: 44.97 KG/M2 | WEIGHT: 279.81 LBS | SYSTOLIC BLOOD PRESSURE: 102 MMHG | HEART RATE: 110 BPM

## 2025-04-03 DIAGNOSIS — O09.523 MULTIGRAVIDA OF ADVANCED MATERNAL AGE IN THIRD TRIMESTER (HCC): Primary | ICD-10-CM

## 2025-04-03 PROCEDURE — 3078F DIAST BP <80 MM HG: CPT | Performed by: STUDENT IN AN ORGANIZED HEALTH CARE EDUCATION/TRAINING PROGRAM

## 2025-04-03 PROCEDURE — 59025 FETAL NON-STRESS TEST: CPT | Performed by: STUDENT IN AN ORGANIZED HEALTH CARE EDUCATION/TRAINING PROGRAM

## 2025-04-03 PROCEDURE — 3008F BODY MASS INDEX DOCD: CPT | Performed by: STUDENT IN AN ORGANIZED HEALTH CARE EDUCATION/TRAINING PROGRAM

## 2025-04-03 PROCEDURE — 3074F SYST BP LT 130 MM HG: CPT | Performed by: STUDENT IN AN ORGANIZED HEALTH CARE EDUCATION/TRAINING PROGRAM

## 2025-04-08 ENCOUNTER — ROUTINE PRENATAL (OUTPATIENT)
Dept: OBGYN CLINIC | Facility: CLINIC | Age: 40
End: 2025-04-08
Payer: COMMERCIAL

## 2025-04-08 ENCOUNTER — ULTRASOUND ENCOUNTER (OUTPATIENT)
Dept: OBGYN CLINIC | Facility: CLINIC | Age: 40
End: 2025-04-08
Payer: COMMERCIAL

## 2025-04-08 VITALS
HEIGHT: 66 IN | HEART RATE: 90 BPM | DIASTOLIC BLOOD PRESSURE: 70 MMHG | SYSTOLIC BLOOD PRESSURE: 114 MMHG | BODY MASS INDEX: 45.64 KG/M2 | WEIGHT: 284 LBS

## 2025-04-08 DIAGNOSIS — D32.9 MENINGIOMA (HCC): ICD-10-CM

## 2025-04-08 DIAGNOSIS — D50.9 IRON DEFICIENCY ANEMIA, UNSPECIFIED IRON DEFICIENCY ANEMIA TYPE: ICD-10-CM

## 2025-04-08 DIAGNOSIS — B95.1 GROUP B STREPTOCOCCAL INFECTION IN PREGNANCY (HCC): ICD-10-CM

## 2025-04-08 DIAGNOSIS — R42 VERTIGO: ICD-10-CM

## 2025-04-08 DIAGNOSIS — O99.210 OBESITY AFFECTING PREGNANCY, ANTEPARTUM, UNSPECIFIED OBESITY TYPE (HCC): ICD-10-CM

## 2025-04-08 DIAGNOSIS — O98.819 GROUP B STREPTOCOCCAL INFECTION IN PREGNANCY (HCC): ICD-10-CM

## 2025-04-08 DIAGNOSIS — O09.90 SUPERVISION OF HIGH RISK PREGNANCY, ANTEPARTUM (HCC): Primary | ICD-10-CM

## 2025-04-08 DIAGNOSIS — F41.9 ANXIETY AND DEPRESSION: ICD-10-CM

## 2025-04-08 DIAGNOSIS — R76.11 REACTION TO TUBERCULIN SKIN TEST WITHOUT ACTIVE TUBERCULOSIS: ICD-10-CM

## 2025-04-08 DIAGNOSIS — B95.1 POSITIVE GBS TEST: ICD-10-CM

## 2025-04-08 DIAGNOSIS — F32.A ANXIETY AND DEPRESSION: ICD-10-CM

## 2025-04-08 DIAGNOSIS — N76.0 VAGINITIS AND VULVOVAGINITIS: ICD-10-CM

## 2025-04-08 DIAGNOSIS — O16.9 ELEVATED BLOOD PRESSURE AFFECTING PREGNANCY, ANTEPARTUM (HCC): ICD-10-CM

## 2025-04-08 DIAGNOSIS — T83.39XA RETAINED INTRAUTERINE CONTRACEPTIVE DEVICE (IUD): ICD-10-CM

## 2025-04-08 DIAGNOSIS — O09.30 LATE PRENATAL CARE (HCC): ICD-10-CM

## 2025-04-08 DIAGNOSIS — O09.523 MULTIGRAVIDA OF ADVANCED MATERNAL AGE IN THIRD TRIMESTER (HCC): ICD-10-CM

## 2025-04-08 PROCEDURE — 3074F SYST BP LT 130 MM HG: CPT | Performed by: STUDENT IN AN ORGANIZED HEALTH CARE EDUCATION/TRAINING PROGRAM

## 2025-04-08 PROCEDURE — 76816 OB US FOLLOW-UP PER FETUS: CPT | Performed by: OBSTETRICS & GYNECOLOGY

## 2025-04-08 PROCEDURE — 3078F DIAST BP <80 MM HG: CPT | Performed by: STUDENT IN AN ORGANIZED HEALTH CARE EDUCATION/TRAINING PROGRAM

## 2025-04-08 PROCEDURE — 76819 FETAL BIOPHYS PROFIL W/O NST: CPT | Performed by: STUDENT IN AN ORGANIZED HEALTH CARE EDUCATION/TRAINING PROGRAM

## 2025-04-08 PROCEDURE — 81514 NFCT DS BV&VAGINITIS DNA ALG: CPT | Performed by: STUDENT IN AN ORGANIZED HEALTH CARE EDUCATION/TRAINING PROGRAM

## 2025-04-08 PROCEDURE — 3008F BODY MASS INDEX DOCD: CPT | Performed by: STUDENT IN AN ORGANIZED HEALTH CARE EDUCATION/TRAINING PROGRAM

## 2025-04-08 NOTE — PATIENT INSTRUCTIONS
Labor Instructions    How do I know if it’s true labor?  One of the most important aspects of any pregnancy is being able to recognize the onset of labor.  Unfortunately, on occasion it can be difficult or confusing, especially if you have had one or more children.  Each labor can begin in a different way even if you have had four or five children.    If this is your first child, it is very common to have labor for an average greater than 10 hours; however, there have been rare instances for labor to be two hours or less for a first time mother. It is more important for you to know if this is your second or third baby to realize that any labor after the first is usually shorter.  There is no way to tell how long or short the labor will be. Therefore, please call us if you are unsure labor has started.       Usually, during the last six weeks of pregnancy, Guille-Flynn contractions or “false labor pains occur”.  False labor is generally not very painful though it is not always easy to tell.  You may feel contractions, cramps or uterine tightening somewhere between every 3-30 minutes but they will not continue to get stronger over time.  If you lie down, drink plenty of fluid or walk around, the contractions may go away.   False contractions are very common if you have been active on your feet for several hours.   Women frequently worry about being sent home from the hospital without having their baby (i.e. the labor stopped).  Actually, this is an unnecessary worry, for this is an infrequent occurrence.     True labor usually begins in one of two ways.  In most patients it begins with contractions of the uterus, which are irregular (but not always) in the beginning.  They are cramp-like in character and feel similar to menstrual cramps.  After a while, they become more regular, and they seem to last a little longer, and feel a little sharper.  These symptoms are very important-more important- than the timing of the  contractions.  Having regular (usually closer), longer lasting (35-70 seconds), and sharper (more painful) contractions are the common symptoms of actual labor.  The second way in which labor can begin which occurs in approximately 30% of all patients is the rupture of the bag of water.  This is a sudden gush of watery fluid, usually sufficient to run down your leg and onto the floor, or you may wet a large area of the bed if it happens at night.  There may also be tricking that is uncontrolled.  If you are unsure, please call the office.      When should I call?  When contractions are strong and every 3-5 minutes.  If you have a gush of water or you think you might be leaking fluid.  If you are bleeding heavily.  If your baby is not moving around at least every 1-2 hours.  If you are worried about something.  When you think you are ready to go to the hospital.    Who do I call?  Call the office at [phone].  If the office is closed, the answering service will send a message to the physician on call.  The on call physician will be available for emergency phone calls only.          Can I eat in labor?  It is good to eat a light meal at home before going to the hospital.  Eat foods like crackers, popsicles, soup and fruit.  Avoid foods that are difficult to digest like meat, a lot of dairy products and high fat foods.  DO NOT EAT if you know you are scheduled for a  ().      What will happen when I get to the hospital?  When you arrive at the hospital, you will be admitted and examined.   There are a few factors that will determine if you will be allowed to be up out of bed or if you would need to stay in bed.  The main factors are how well the baby is entering into the pelvis and if the bag of rivas is in intact or ruptured.  An intravenous (IV) solution will, with exceptions, be started.  This is extremely important especially for the baby.   Your  will be allowed in the room during your  labor. During the delivery, the nurses will inform you of the hospital policy and how many coaches are allowed.  You may desire pain medication or anesthesia for pain.  You probably discussed some aspects of pain medication with us during your prenatal care.  The various options may also have been discussed in Prenatal Classes.  We utilize IV narcotics and epidural anesthesia when our patients request to have them.  If you chose to have no anesthesia, none will be administered.  A local anesthetic may be used at the time of delivery      What should I to bring to the hospital?  Maternity clothes to go home in  You can bring your own night gown to wear after giving birth, but most women prefer to wear the hospital gown because it may get soiled  Nursing Bra if you are planning to breastfeed  Clothes for your baby to go home in  Baby Car Seat.  Be sure you know how to install it correctly. Please install it before going to the hospital  Routine toiletries like toothbrush, shampoo, hairbrush and etc.   You can bring your favorite pillow, but please put a colored pillow case on it so it doesn’t get mixed up with hospital pillows    How long will I stay in the hospital?  The date you leave the hospital may vary depending on the speed of your recovery.  If you have a vaginal delivery, you will stay in the hospital 24-48 hours after your delivery as long as you aren’t having any complications.    If you have had a , you will stay in the hospital 48-72 hours as long as you aren’t having any complications.       Going Home Instructions  There are no set rules as to what you may do each day or week after you are home.  You will receive discharge instructions to help you each day.  Remember, early ambulation in the hospital is to prevent complications.  Do not let this lull you into a false sense of strength or ability to do certain physical acts which may tire you excessively.  Please call the office within a few days  after you are discharged from the hospital to schedule your post-partum visit, which is usually 4-6 weeks after delivery.    Any medications necessary will be discussed on an individual basis.  If you decide to breastfeed your baby, you should continue your prenatal vitamins.  If you do not breastfeed, simply finish the prenatal vitamins you have.      The staff at [facility] wish you a joyous and exciting birth.  If there is anything we can do to make this a better experience for you please do not hesitate to ask.

## 2025-04-08 NOTE — PROGRESS NOTES
WALDO    GA: 38w5d  Vitals:    25 1438   BP: 114/70   Pulse: 90   Weight: 284 lb (128.8 kg)   Height: 66\"       Doing well, +FM. Reports thicker white discharge for past several days, concerned about possible yeast infection. Desires vaginitis panel.  Denies LOF/VB/uctx  SVE 1/L/H, firm and posterior   BPP 8/8 with normal MURALI today, vtx, ant plc    Assessment:     Maynor Prater is a 39 year old  at 38w5d who presents for routine OB visit. Overall doing well.     Problem List Items Addressed This Visit       Vertigo    Meningioma ([facility])    AMA (advanced maternal age) multigravida 35+ ([facility])    Relevant Orders    Fetal Biphysical Profile w/o NST [18662] (Completed)    Late prenatal care ([facility])    Reaction to tuberculin skin test without active tuberculosis    Retained intrauterine contraceptive device (IUD)    Obesity affecting pregnancy, antepartum ([facility])    Relevant Orders    Fetal Biphysical Profile w/o NST [65600] (Completed)    Anxiety and depression    Elevated blood pressure affecting pregnancy, antepartum ([facility])    Iron deficiency anemia    Group B streptococcal infection in pregnancy ([facility])    Positive GBS test     Other Visit Diagnoses       Supervision of high risk pregnancy, antepartum ([facility])    -  Primary    Relevant Orders    Fetal Biphysical Profile w/o NST [43733] (Completed)    Vaginitis and vulvovaginitis        Relevant Orders    Vaginitis Vaginosis PCR Panel                Plan:     Patient Active Problem List    Diagnosis    Positive GBS test    Group B streptococcal infection in pregnancy ([facility])     3/2025      Iron deficiency anemia     [ ] Hematology       Elevated blood pressure affecting pregnancy, antepartum ([facility])     - Mild range BP x1 148/72 at 23w4d  25 normal today      Late prenatal care ([facility])     Dating by 18 wk US.    - delivery by due date, antepartum testing and growth US's      Retained intrauterine contraceptive device (IUD)     Failed IUD.  Pulled at 14 wks of  pregnancy      Obesity affecting pregnancy, antepartum (HCC)     [ x ] Early glucola- normal  - MFM, Level 2  [x] Failed 1 hr, normal 3hr GTT      Anxiety and depression     History.  With PTSD from marital problems, sudden loss of family member.  Briefly treated but not recently.      AMA (advanced maternal age) multigravida 35+ (HCC)     Declines aneuploidy screening  [ x ] MFM/Level 2- incomplete  [  ] BASA until 37 weeks   Follow-up in 2 to 3 weeks to complete views  Monthly growth ultrasounds starting at 28 weeks with the addition of a BPP with each growth assessment 32 weeks and beyond  Weekly NST's at 32 weeks.  Twice weekly testing at 38 weeks - weekly NST and weekly BPP.  Delivery at 39-40 weeks.  She was advised to limit gestational weight gain to less than 20 pounds  25 pt declined to see MFM due to insurance; plan to f/u on ultrasound with our clinic; pt needs to schedule      Meningioma (HCC)     Ok for vaginal delivery. Consider Neuro if symptoms   BELOW FROM FIRST PREGNANCY-   5/21/15-- note from Dr Stoner-- 5-10mm meningioma incidental finding.  pt ok for   3/13 Saw Dr. dale at [facility]. Had asked for consult letter to mailed to us. Will request again.  Pt will meet with Neuro to discuss recurrence of dizziness sx since off of Klonopin and Effexor and delivery mgmt.      Reaction to tuberculin skin test without active tuberculosis     Multiple negative CXR's      Vertigo       - continue routine prenatal care   - labor and rupture of membrane precautions provided  GBS   Lab Results   Component Value Date    GBS Positive (A) 2025      Fetal movement count given  Labor precautions provided   Vaginitis panel collected today  IOL scheduled at 39 weeks for PM cytotec      Jennifer Pretty, DO  [facility] - OBGYN      Note to patient and family   The  [employer] Cures Act makes medical notes available to patients in the interest of transparency.  However, please be advised that this is a  medical document.  It is intended as [id number] communication.  It is written and medical language may contain abbreviations or verbiage that are technical and unfamiliar.  It may appear blunt or direct.  Medical documents are intended to carry relevant information, facts as evident, and the clinical opinion of the practitioner.

## 2025-04-09 DIAGNOSIS — O99.210 OBESITY AFFECTING PREGNANCY, ANTEPARTUM, UNSPECIFIED OBESITY TYPE (HCC): Primary | ICD-10-CM

## 2025-04-09 DIAGNOSIS — O09.30 LATE PRENATAL CARE (HCC): ICD-10-CM

## 2025-04-09 DIAGNOSIS — O09.523 MULTIGRAVIDA OF ADVANCED MATERNAL AGE IN THIRD TRIMESTER (HCC): ICD-10-CM

## 2025-04-10 LAB
BV BACTERIA DNA VAG QL NAA+PROBE: POSITIVE
C GLABRATA DNA VAG QL NAA+PROBE: POSITIVE
C KRUSEI DNA VAG QL NAA+PROBE: NEGATIVE
CANDIDA DNA VAG QL NAA+PROBE: POSITIVE
T VAGINALIS DNA VAG QL NAA+PROBE: NEGATIVE

## 2025-04-11 ENCOUNTER — ANESTHESIA (OUTPATIENT)
Dept: OBGYN CLINIC | Facility: HOSPITAL | Age: 40
End: 2025-04-11
Payer: COMMERCIAL

## 2025-04-11 ENCOUNTER — HOSPITAL ENCOUNTER (INPATIENT)
Facility: HOSPITAL | Age: 40
LOS: 2 days | Discharge: HOME | End: 2025-04-13
Attending: STUDENT IN AN ORGANIZED HEALTH CARE EDUCATION/TRAINING PROGRAM | Admitting: STUDENT IN AN ORGANIZED HEALTH CARE EDUCATION/TRAINING PROGRAM
Payer: COMMERCIAL

## 2025-04-11 ENCOUNTER — APPOINTMENT (OUTPATIENT)
Dept: OBGYN CLINIC | Facility: HOSPITAL | Age: 40
End: 2025-04-11
Payer: COMMERCIAL

## 2025-04-11 PROBLEM — Z34.90 PREGNANCY (HCC): Status: ACTIVE | Noted: 2025-04-11

## 2025-04-11 LAB
ANTIBODY SCREEN: NEGATIVE
BASOPHILS # BLD AUTO: 0.02 X10(3) UL (ref 0–0.2)
BASOPHILS NFR BLD AUTO: 0.2 %
EOSINOPHIL # BLD AUTO: 0.07 X10(3) UL (ref 0–0.7)
EOSINOPHIL NFR BLD AUTO: 0.7 %
ERYTHROCYTE [DISTWIDTH] IN BLOOD BY AUTOMATED COUNT: 14.6 %
HCT VFR BLD AUTO: 30.5 % (ref 35–48)
HGB BLD-MCNC: 9.8 G/DL (ref 12–16)
IMM GRANULOCYTES # BLD AUTO: 0.03 X10(3) UL (ref 0–1)
IMM GRANULOCYTES NFR BLD: 0.3 %
LYMPHOCYTES # BLD AUTO: 2.05 X10(3) UL (ref 1–4)
LYMPHOCYTES NFR BLD AUTO: 20.2 %
MCH RBC QN AUTO: 25.1 PG (ref 26–34)
MCHC RBC AUTO-ENTMCNC: 32.1 G/DL (ref 31–37)
MCV RBC AUTO: 78.2 FL (ref 80–100)
MONOCYTES # BLD AUTO: 0.62 X10(3) UL (ref 0.1–1)
MONOCYTES NFR BLD AUTO: 6.1 %
NEUTROPHILS # BLD AUTO: 7.35 X10 (3) UL (ref 1.5–7.7)
NEUTROPHILS # BLD AUTO: 7.35 X10(3) UL (ref 1.5–7.7)
NEUTROPHILS NFR BLD AUTO: 72.5 %
PLATELET # BLD AUTO: 356 10(3)UL (ref 150–450)
RBC # BLD AUTO: 3.9 X10(6)UL (ref 3.8–5.3)
RH BLOOD TYPE: POSITIVE
T PALLIDUM AB SER QL IA: NONREACTIVE
WBC # BLD AUTO: 10.1 X10(3) UL (ref 4–11)

## 2025-04-11 PROCEDURE — 3E0P7VZ INTRODUCTION OF HORMONE INTO FEMALE REPRODUCTIVE, VIA NATURAL OR ARTIFICIAL OPENING: ICD-10-PCS | Performed by: OBSTETRICS & GYNECOLOGY

## 2025-04-11 RX ORDER — ONDANSETRON 2 MG/ML
4 INJECTION INTRAMUSCULAR; INTRAVENOUS EVERY 6 HOURS PRN
Status: DISCONTINUED | OUTPATIENT
Start: 2025-04-11 | End: 2025-04-12 | Stop reason: HOSPADM

## 2025-04-11 RX ORDER — TERBUTALINE SULFATE 1 MG/ML
0.25 INJECTION SUBCUTANEOUS AS NEEDED
Status: DISCONTINUED | OUTPATIENT
Start: 2025-04-11 | End: 2025-04-12 | Stop reason: HOSPADM

## 2025-04-11 RX ORDER — ACETAMINOPHEN 500 MG
500 TABLET ORAL EVERY 6 HOURS PRN
Status: DISCONTINUED | OUTPATIENT
Start: 2025-04-11 | End: 2025-04-12 | Stop reason: HOSPADM

## 2025-04-11 RX ORDER — ROPIVACAINE HYDROCHLORIDE 5 MG/ML
30 INJECTION, SOLUTION EPIDURAL; INFILTRATION; PERINEURAL AS NEEDED
Status: DISCONTINUED | OUTPATIENT
Start: 2025-04-11 | End: 2025-04-12 | Stop reason: HOSPADM

## 2025-04-11 RX ORDER — CITRIC ACID/SODIUM CITRATE 334-500MG
30 SOLUTION, ORAL ORAL AS NEEDED
Status: DISCONTINUED | OUTPATIENT
Start: 2025-04-11 | End: 2025-04-12 | Stop reason: HOSPADM

## 2025-04-11 RX ORDER — SODIUM CHLORIDE, SODIUM LACTATE, POTASSIUM CHLORIDE, CALCIUM CHLORIDE 600; 310; 30; 20 MG/100ML; MG/100ML; MG/100ML; MG/100ML
INJECTION, SOLUTION INTRAVENOUS CONTINUOUS
Status: DISCONTINUED | OUTPATIENT
Start: 2025-04-11 | End: 2025-04-12 | Stop reason: HOSPADM

## 2025-04-11 RX ORDER — DEXTROSE, SODIUM CHLORIDE, SODIUM LACTATE, POTASSIUM CHLORIDE, AND CALCIUM CHLORIDE 5; .6; .31; .03; .02 G/100ML; G/100ML; G/100ML; G/100ML; G/100ML
INJECTION, SOLUTION INTRAVENOUS AS NEEDED
Status: DISCONTINUED | OUTPATIENT
Start: 2025-04-11 | End: 2025-04-12 | Stop reason: HOSPADM

## 2025-04-11 RX ORDER — IBUPROFEN 600 MG/1
600 TABLET, FILM COATED ORAL ONCE AS NEEDED
Status: COMPLETED | OUTPATIENT
Start: 2025-04-11 | End: 2025-04-12

## 2025-04-11 RX ORDER — METRONIDAZOLE 500 MG/1
500 TABLET ORAL 2 TIMES DAILY
Status: DISCONTINUED | OUTPATIENT
Start: 2025-04-11 | End: 2025-04-12

## 2025-04-11 RX ORDER — FLUCONAZOLE 150 MG/1
150 TABLET ORAL DAILY
Status: DISCONTINUED | OUTPATIENT
Start: 2025-04-11 | End: 2025-04-12

## 2025-04-11 RX ORDER — HYDROMORPHONE HYDROCHLORIDE 1 MG/ML
1 INJECTION, SOLUTION INTRAMUSCULAR; INTRAVENOUS; SUBCUTANEOUS EVERY 2 HOUR PRN
Refills: 0 | Status: DISCONTINUED | OUTPATIENT
Start: 2025-04-11 | End: 2025-04-12

## 2025-04-11 RX ORDER — ACETAMINOPHEN 500 MG
1000 TABLET ORAL EVERY 6 HOURS PRN
Status: DISCONTINUED | OUTPATIENT
Start: 2025-04-11 | End: 2025-04-12 | Stop reason: HOSPADM

## 2025-04-11 NOTE — PROGRESS NOTES
Pt is a 39 year old female admitted to -A.     Chief Complaint   Patient presents with    Scheduled Induction      Pt is  39w1d intra-uterine pregnancy.  History obtained, consents signed. Oriented to room, staff, and plan of care.

## 2025-04-12 ENCOUNTER — ANESTHESIA (OUTPATIENT)
Dept: OBGYN UNIT | Facility: HOSPITAL | Age: 40
End: 2025-04-12
Payer: COMMERCIAL

## 2025-04-12 ENCOUNTER — ANESTHESIA EVENT (OUTPATIENT)
Dept: OBGYN UNIT | Facility: HOSPITAL | Age: 40
End: 2025-04-12
Payer: COMMERCIAL

## 2025-04-12 LAB
ALBUMIN SERPL-MCNC: 4 G/DL (ref 3.2–4.8)
ALBUMIN/GLOB SERPL: 1.5 {RATIO} (ref 1–2)
ALP LIVER SERPL-CCNC: 94 U/L (ref 37–98)
ALT SERPL-CCNC: 13 U/L (ref 10–49)
ANION GAP SERPL CALC-SCNC: 10 MMOL/L (ref 0–18)
AST SERPL-CCNC: 17 U/L (ref ?–34)
BILIRUB SERPL-MCNC: 0.8 MG/DL (ref 0.3–1.2)
BUN BLD-MCNC: 7 MG/DL (ref 9–23)
CALCIUM BLD-MCNC: 9.3 MG/DL (ref 8.7–10.6)
CHLORIDE SERPL-SCNC: 106 MMOL/L (ref 98–112)
CO2 SERPL-SCNC: 22 MMOL/L (ref 21–32)
CREAT BLD-MCNC: 0.61 MG/DL (ref 0.55–1.02)
CREAT UR-SCNC: 190.2 MG/DL
EGFRCR SERPLBLD CKD-EPI 2021: 116 ML/MIN/1.73M2 (ref 60–?)
GLOBULIN PLAS-MCNC: 2.6 G/DL (ref 2–3.5)
GLUCOSE BLD-MCNC: 107 MG/DL (ref 70–99)
OSMOLALITY SERPL CALC.SUM OF ELEC: 284 MOSM/KG (ref 275–295)
POTASSIUM SERPL-SCNC: 4 MMOL/L (ref 3.5–5.1)
PROT SERPL-MCNC: 6.6 G/DL (ref 5.7–8.2)
PROT UR-MCNC: 39.5 MG/DL (ref ?–14)
PROT/CREAT UR-RTO: 0.21
SODIUM SERPL-SCNC: 138 MMOL/L (ref 136–145)
URATE SERPL-MCNC: 5.3 MG/DL (ref 3.1–7.8)

## 2025-04-12 PROCEDURE — 10907ZC DRAINAGE OF AMNIOTIC FLUID, THERAPEUTIC FROM PRODUCTS OF CONCEPTION, VIA NATURAL OR ARTIFICIAL OPENING: ICD-10-PCS | Performed by: OBSTETRICS & GYNECOLOGY

## 2025-04-12 PROCEDURE — 0HQ9XZZ REPAIR PERINEUM SKIN, EXTERNAL APPROACH: ICD-10-PCS | Performed by: OBSTETRICS & GYNECOLOGY

## 2025-04-12 PROCEDURE — 0UQMXZZ REPAIR VULVA, EXTERNAL APPROACH: ICD-10-PCS | Performed by: OBSTETRICS & GYNECOLOGY

## 2025-04-12 PROCEDURE — 59400 OBSTETRICAL CARE: CPT | Performed by: OBSTETRICS & GYNECOLOGY

## 2025-04-12 RX ORDER — TRANEXAMIC ACID 10 MG/ML
INJECTION, SOLUTION INTRAVENOUS
Status: DISPENSED
Start: 2025-04-12 | End: 2025-04-13

## 2025-04-12 RX ORDER — ONDANSETRON 2 MG/ML
4 INJECTION INTRAMUSCULAR; INTRAVENOUS EVERY 6 HOURS PRN
Status: DISCONTINUED | OUTPATIENT
Start: 2025-04-12 | End: 2025-04-13

## 2025-04-12 RX ORDER — SIMETHICONE 80 MG
80 TABLET,CHEWABLE ORAL 3 TIMES DAILY PRN
Status: DISCONTINUED | OUTPATIENT
Start: 2025-04-12 | End: 2025-04-13

## 2025-04-12 RX ORDER — LIDOCAINE HYDROCHLORIDE AND EPINEPHRINE 15; 5 MG/ML; UG/ML
INJECTION, SOLUTION EPIDURAL AS NEEDED
Status: DISCONTINUED | OUTPATIENT
Start: 2025-04-12 | End: 2025-04-12 | Stop reason: SURG

## 2025-04-12 RX ORDER — LIDOCAINE HYDROCHLORIDE AND EPINEPHRINE 15; 5 MG/ML; UG/ML
5 INJECTION, SOLUTION EPIDURAL AS NEEDED
Status: DISCONTINUED | OUTPATIENT
Start: 2025-04-12 | End: 2025-04-12

## 2025-04-12 RX ORDER — SODIUM CHLORIDE 9 MG/ML
10 INJECTION, SOLUTION INTRAMUSCULAR; INTRAVENOUS; SUBCUTANEOUS AS NEEDED
Status: DISCONTINUED | OUTPATIENT
Start: 2025-04-12 | End: 2025-04-12

## 2025-04-12 RX ORDER — IBUPROFEN 600 MG/1
600 TABLET, FILM COATED ORAL EVERY 6 HOURS
Status: DISCONTINUED | OUTPATIENT
Start: 2025-04-12 | End: 2025-04-13

## 2025-04-12 RX ORDER — ACETAMINOPHEN 500 MG
500 TABLET ORAL EVERY 6 HOURS PRN
Status: DISCONTINUED | OUTPATIENT
Start: 2025-04-12 | End: 2025-04-13

## 2025-04-12 RX ORDER — AMMONIA 15 % (W/V)
0.3 AMPUL (EA) INHALATION AS NEEDED
Status: DISCONTINUED | OUTPATIENT
Start: 2025-04-12 | End: 2025-04-13

## 2025-04-12 RX ORDER — MISOPROSTOL 200 UG/1
TABLET ORAL
Status: COMPLETED
Start: 2025-04-12 | End: 2025-04-12

## 2025-04-12 RX ORDER — MISOPROSTOL 200 UG/1
1000 TABLET ORAL ONCE
Status: COMPLETED | OUTPATIENT
Start: 2025-04-12 | End: 2025-04-12

## 2025-04-12 RX ORDER — BUPIVACAINE HYDROCHLORIDE 2.5 MG/ML
30 INJECTION, SOLUTION EPIDURAL; INFILTRATION; INTRACAUDAL; PERINEURAL AS NEEDED
Status: DISCONTINUED | OUTPATIENT
Start: 2025-04-12 | End: 2025-04-12

## 2025-04-12 RX ORDER — LIDOCAINE HYDROCHLORIDE 20 MG/ML
5 INJECTION, SOLUTION EPIDURAL; INFILTRATION; INTRACAUDAL; PERINEURAL AS NEEDED
Status: DISCONTINUED | OUTPATIENT
Start: 2025-04-12 | End: 2025-04-12

## 2025-04-12 RX ORDER — ACETAMINOPHEN 500 MG
1000 TABLET ORAL EVERY 6 HOURS PRN
Status: DISCONTINUED | OUTPATIENT
Start: 2025-04-12 | End: 2025-04-13

## 2025-04-12 RX ORDER — BUPIVACAINE HCL/0.9 % NACL/PF 0.25 %
5 PLASTIC BAG, INJECTION (ML) EPIDURAL AS NEEDED
Status: DISCONTINUED | OUTPATIENT
Start: 2025-04-12 | End: 2025-04-12

## 2025-04-12 RX ORDER — BISACODYL 10 MG
10 SUPPOSITORY, RECTAL RECTAL ONCE AS NEEDED
Status: DISCONTINUED | OUTPATIENT
Start: 2025-04-12 | End: 2025-04-13

## 2025-04-12 RX ORDER — NALBUPHINE HYDROCHLORIDE 10 MG/ML
2.5 INJECTION INTRAMUSCULAR; INTRAVENOUS; SUBCUTANEOUS
Status: DISCONTINUED | OUTPATIENT
Start: 2025-04-12 | End: 2025-04-12

## 2025-04-12 RX ORDER — DOCUSATE SODIUM 100 MG/1
100 CAPSULE, LIQUID FILLED ORAL
Status: DISCONTINUED | OUTPATIENT
Start: 2025-04-12 | End: 2025-04-13

## 2025-04-12 RX ADMIN — LIDOCAINE HYDROCHLORIDE AND EPINEPHRINE 5 ML: 15; 5 INJECTION, SOLUTION EPIDURAL at 11:59:00

## 2025-04-12 NOTE — PLAN OF CARE
Problem: POSTPARTUM  Goal: Experiences normal breast weaning course  Description: INTERVENTIONS:- Assess for and manage engorgement.- Instruct on breast care.- Provide comfort measures.  Outcome: Progressing     Problem: POSTPARTUM  Goal: Appropriate maternal -  bonding  Description: INTERVENTIONS:- Assess caregiver- interactions.- Assess caregiver's emotional status and coping mechanisms.- Encourage caregiver to participate in  daily care.- Assess support systems available to mother/family.- Provide /case management support as needed.  Outcome: Progressing

## 2025-04-12 NOTE — ANESTHESIA PREPROCEDURE EVALUATION
PRE-OP EVALUATION    Patient Name: Maynor Prater    Admit Diagnosis: Pregnancy (HCC)    Pre-op Diagnosis: * No surgery found *        Anesthesia Procedure: LABOR ANALGESIA    * Surgery not found *    Pre-op vitals reviewed.  Temp: 97.8 °F (36.6 °C)  Pulse: 94  Resp: 22  BP: 115/56  SpO2: 93 %  Body mass index is 45.84 kg/m².    Current medications reviewed.  Hospital Medications:  Current Medications[1]    Outpatient Medications:   Prescriptions Prior to Admission[2]    Allergies: Lamictal and Lexapro [escitalopram]      Anesthesia Evaluation    Patient summary reviewed.    Anesthetic Complications           GI/Hepatic/Renal    Negative GI/hepatic/renal ROS.                             Cardiovascular                  (+) hypertension                                     Endo/Other    Negative endo/other ROS.                              Pulmonary    Negative pulmonary ROS.                       Neuro/Psych      (+) depression                                Past Surgical History[3]  Social Hx on file[4]  History   Drug Use No     Available pre-op labs reviewed.  Lab Results   Component Value Date    WBC 10.1 04/11/2025    RBC 3.90 04/11/2025    HGB 9.8 (L) 04/11/2025    HCT 30.5 (L) 04/11/2025    MCV 78.2 (L) 04/11/2025    MCH 25.1 (L) 04/11/2025    MCHC 32.1 04/11/2025    RDW 14.6 04/11/2025    .0 04/11/2025     Lab Results   Component Value Date     01/14/2025    K 3.4 (L) 01/14/2025     01/14/2025    CO2 19.0 (L) 01/14/2025    BUN 6 (L) 01/14/2025    CREATSERUM 0.60 01/14/2025     (H) 01/14/2025    CA 9.0 01/14/2025            Airway      Mallampati: II  Mouth opening: >3 FB  TM distance: 4 - 6 cm  Neck ROM: full Cardiovascular      Rhythm: regular  Rate: normal     Dental    Dentition appears grossly intact         Pulmonary      Breath sounds clear to auscultation bilaterally.               Other findings              ASA: 2   Plan: epidural  NPO status verified and           Plan/risks  discussed with: patient (Risks of epidural discussed including bleeding, infection and nerve damage.)                Present on Admission:  **None**             [1]    lactated ringers IV bolus 1,000 mL  1,000 mL Intravenous Once    fentaNYL-bupivacaine 2 mcg/mL-0.125% in sodium chloride 0.9% 100 mL EPIDURAL infusion premix  12 mL/hr Epidural Continuous    fentaNYL (Sublimaze) 50 mcg/mL injection 100 mcg  100 mcg Epidural Once    lidocaine 1.5%-EPINEPHrine 1:200,000 (Xylocaine-Epinephrine) injection  5 mL Injection PRN    bupivacaine PF (Marcaine) 0.25% injection  30 mL Injection PRN    lidocaine PF (Xylocaine-MPF) 2% injection  5 mL Injection PRN    sodium chloride 0.9% PF injection 10 mL  10 mL Injection PRN    ePHEDrine (PF) 25 MG/5 ML injection 5 mg  5 mg Intravenous PRN    nalbuphine (Nubain) 10 mg/mL injection 2.5 mg  2.5 mg Intravenous Q15 Min PRN    lactated ringers infusion   Intravenous Continuous    dextrose in lactated ringers 5% infusion   Intravenous PRN    lactated ringers IV bolus 500 mL  500 mL Intravenous PRN    acetaminophen (Tylenol Extra Strength) tab 500 mg  500 mg Oral Q6H PRN    acetaminophen (Tylenol Extra Strength) tab 1,000 mg  1,000 mg Oral Q6H PRN    ibuprofen (Motrin) tab 600 mg  600 mg Oral Once PRN    ondansetron (Zofran) 4 MG/2ML injection 4 mg  4 mg Intravenous Q6H PRN    oxyTOCIN in sodium chloride 0.9% (Pitocin) 30 Units/500mL infusion premix  62.5-900 basil-units/min Intravenous Continuous    terbutaline (Brethine) 1 MG/ML injection 0.25 mg  0.25 mg Subcutaneous PRN    sodium citrate-citric acid (Bicitra) 500-334 MG/5ML oral solution 30 mL  30 mL Oral PRN    ropivacaine (Naropin) 0.5% injection  30 mL Injection PRN    [COMPLETED] ampicillin (Omnipen) 2 g in sodium chloride 0.9% 100 mL IVPB-PEDRO  2 g Intravenous Once    Followed by    ampicillin (Omnipen) 1 g in sodium chloride 0.9% 100mL IVPB-PEDRO  1 g Intravenous Q4H    misoprostol (CYTOTEC) partial tablets 25 mcg  25 mcg Vaginal  6 times per day    oxyTOCIN in sodium chloride 0.9% (Pitocin) 30 Units/500mL infusion premix  0.5-20 basil-units/min Intravenous Continuous    metroNIDAZOLE (Flagyl) tab 500 mg  500 mg Oral BID    fluconazole (Diflucan) tab 150 mg  150 mg Oral Daily    HYDROmorphone (Dilaudid) 1 MG/ML injection 1 mg  1 mg Intravenous Q2H PRN   [2]   Medications Prior to Admission   Medication Sig Dispense Refill Last Dose/Taking    Prenatal Vit-DSS-Fe Cbn-FA (PRENATAL AD OR) Take by mouth.   2025    metroNIDAZOLE 500 MG Oral Tab Take 1 tablet (500 mg total) by mouth in the morning and 1 tablet (500 mg total) before bedtime. Do all this for 7 days. 14 tablet 0     fluconazole 150 MG Oral Tab  (Patient not taking: Reported on 2025)      [3]   Past Surgical History:  Procedure Laterality Date    D & c      Eye muscle surg proc unlisted Bilateral     Laparoscopic cholecystectomy  03/15/16    Lasik      Removal gallbladder      Tonsillectomy      Upper gi endoscopy; w/endoscopic u/s esophageal exam     [4]   Social History  Socioeconomic History    Marital status: Significant Other    Number of children: 1   Occupational History    Occupation: RN Oncology     Comment: [employer]   Tobacco Use    Smoking status: Former     Current packs/day: 0.00     Types: Cigarettes     Quit date: 2/3/2009     Years since quittin.1    Smokeless tobacco: Former    Tobacco comments:     per [employer] - no   Vaping Use    Vaping status: Never Used   Substance and Sexual Activity    Alcohol use: No     Alcohol/week: 0.0 standard drinks of alcohol     Comment: None.     Drug use: No    Sexual activity: Yes     Partners: Male     Birth control/protection: I.U.D.   Other Topics Concern    Caffeine Concern Yes     Comment: iced coffe 2 cups 2 x a week

## 2025-04-12 NOTE — PROGRESS NOTES
[facility]  Obstetrics and Gynecology    OB/GYN: Intrapartum Progress Note     SUBJECTIVE:  Feels comfortable with CHINTAN.    OBJECTIVE:  Vitals:    25 1222 25 1225 25 1230 25 1250   BP: 116/58 113/49 115/56 108/55   Pulse: 99 101 94 93   Resp:       Temp:       TempSrc:       SpO2:   93%    Weight:       Height:           Physical Exam:  General: AAO. NAD.   Fundus: gravid.    FHT: baseline 140s, moderate variability, + accelerations, + intermittent early and variable decelerations    Vivian: q 2-3 min  SVE: /-2    Interventions Performed    SVE    ASSESSMENT/PLAN:  Patient is a 39 year old  female at 39w2d admitted for IOL.     Labor  - Cytotec x2, now augmented with amniotomy. Pitocin started at 09:51, now at 4U  - Pain: CHINTAN in place  - EFW: 3/28 - 81%, AC 94.5%  - RH+, RI.   - GBS positive - ampicillin  - Antepartum conditions   Obesity - BMI 46   AMA    Late PNC - d/b 18 week US   Anxiety/depression, history of PTSD   Meningioma - cleared for vaginal delivery   Reaction to tuberculin skin tests without active TB with multiple negative CXRs   Vertigo     Plan: Continue pitocin induction of labor. Discussed that on exam, head feels OP. Plan for position changes to aid and pitocin as tolerated. Can place IUPC if no cervical change on next exam.     Cole Hawkins MD   [facility] - OBGYN

## 2025-04-12 NOTE — ANESTHESIA PROCEDURE NOTES
Labor Analgesia    Date/Time: 4/12/2025 11:50 AM    Performed by: Zac Leyva DO  Authorized by: Zac Leyva DO      General Information and Staff    Start Time:  4/12/2025 11:50 AM  End Time:  4/12/2025 11:59 AM  Anesthesiologist:  Zac Leyva DO  Performed by:  Anesthesiologist  Patient Location:  OB  Site Identification: surface landmarks    Reason for Block: labor epidural    Preanesthetic Checklist: patient identified, IV checked, risks and benefits discussed, monitors and equipment checked, pre-op evaluation, timeout performed, IV bolus, anesthesia consent and sterile technique used      Procedure Details    Patient Position:  Sitting  Prep: ChloraPrep    Monitoring:  Heart rate and continuous pulse ox  Approach:  Midline    Epidural Needle    Injection Technique:  LOLITA saline  Needle Type:  Tuohy  Needle Gauge:  17 G  Needle Length:  3.375 in  Location:  L3-4    Spinal Needle      Catheter    Catheter Type:  End hole  Catheter Size:  19 G  Test Dose:  Negative    Assessment      Additional Comments

## 2025-04-12 NOTE — PLAN OF CARE
Problem: BIRTH - VAGINAL/ SECTION  Goal: Fetal and maternal status remain reassuring during the birth process  Description: INTERVENTIONS:- Monitor vital signs- Monitor fetal heart rate- Monitor uterine activity- Monitor labor progression (vaginal delivery)- DVT prophylaxis (C/S delivery)- Surgical antibiotic prophylaxis (C/S delivery)  Outcome: Progressing     Problem: PAIN - ADULT  Goal: Verbalizes/displays adequate comfort level or patient's stated pain goal  Description: INTERVENTIONS:- Encourage pt to monitor pain and request assistance- Assess pain using appropriate pain scale- Administer analgesics based on type and severity of pain and evaluate response- Implement non-pharmacological measures as appropriate and evaluate response- Consider cultural and social influences on pain and pain management- Manage/alleviate anxiety- Utilize distraction and/or relaxation techniques- Monitor for opioid side effects- Notify MD/LIP if interventions unsuccessful or patient reports new pain- Anticipate increased pain with activity and pre-medicate as appropriate  Outcome: Progressing     Problem: ANXIETY  Goal: Will report anxiety at manageable levels  Description: INTERVENTIONS:- Administer medication as ordered- Teach and rehearse alternative coping skills- Provide emotional support with 1:1 interaction with staff  Outcome: Progressing     Problem: Patient/Family Goals  Goal: Patient/Family Long Term Goal  Description: Patient's Long Term Goal: Safe delivery for mom and baby Interventions:- - See additional Care Plan goals for specific interventions  Outcome: Progressing  Goal: Patient/Family Short Term Goal  Description: Patient's Short Term Goal: Adequate pain control Interventions: - - See additional Care Plan goals for specific interventions  Outcome: Progressing

## 2025-04-12 NOTE — PLAN OF CARE
Problem: BIRTH - VAGINAL/ SECTION  Goal: Fetal and maternal status remain reassuring during the birth process  Description: INTERVENTIONS:- Monitor vital signs- Monitor fetal heart rate- Monitor uterine activity- Monitor labor progression (vaginal delivery)- DVT prophylaxis (C/S delivery)- Surgical antibiotic prophylaxis (C/S delivery)  2025 by Vane Jenkins RN  Outcome: Completed  2025 by Vane Jenkins RN  Outcome: Progressing     Problem: PAIN - ADULT  Goal: Verbalizes/displays adequate comfort level or patient's stated pain goal  Description: INTERVENTIONS:- Encourage pt to monitor pain and request assistance- Assess pain using appropriate pain scale- Administer analgesics based on type and severity of pain and evaluate response- Implement non-pharmacological measures as appropriate and evaluate response- Consider cultural and social influences on pain and pain management- Manage/alleviate anxiety- Utilize distraction and/or relaxation techniques- Monitor for opioid side effects- Notify MD/LIP if interventions unsuccessful or patient reports new pain- Anticipate increased pain with activity and pre-medicate as appropriate  2025 by Vane Jenkins RN  Outcome: Completed  2025 by Vane Jenkins RN  Outcome: Progressing     Problem: ANXIETY  Goal: Will report anxiety at manageable levels  Description: INTERVENTIONS:- Administer medication as ordered- Teach and rehearse alternative coping skills- Provide emotional support with 1:1 interaction with staff  2025 by Vane Jenkins RN  Outcome: Completed  2025 by Vane Jenkins RN  Outcome: Progressing     Problem: Patient/Family Goals  Goal: Patient/Family Long Term Goal  Description: Patient's Long Term Goal: Safe delivery for mom and baby Interventions:- - See additional Care Plan goals for specific interventions  2025 by Vane Jenkins RN  Outcome: Completed  2025 by  Vane Jenkins, RN  Outcome: Progressing  Goal: Patient/Family Short Term Goal  Description: Patient's Short Term Goal: Adequate pain control Interventions: - - See additional Care Plan goals for specific interventions  4/12/2025 1825 by Vane Jenkins, RN  Outcome: Completed  4/12/2025 0750 by Vane Jenkins, RN  Outcome: Progressing

## 2025-04-12 NOTE — L&D DELIVERY NOTE
Moi, Girl [[id number]]      Labor Events     labor?: No   steroids?: None  Antibiotics received during labor?: Yes  Antibiotics (enter # doses in comment): ampicillin (Comment: Amp 2g x1, Amp 1g x4)  Rupture date/time: 2025 0806     Rupture type: AROM  Fluid color: Clear  Labor type: Induced Onset of Labor  Induction: Misoprostol, AROM  Indications for induction: Other - comment  Induction comment: AMA  Intrapartum & labor complications: Group B beta strep +       Labor Event Times    Labor onset date/time: 2025 0810  Dilation complete date/time: 2025 1449  Start pushing date/time: 2025 14:54        Presentation    Presentation: Vertex  Position: Left Occiput Anterior       Operative Delivery    Operative Vaginal Delivery: No                Shoulder Dystocia    Shoulder Dystocia: No       Anesthesia    Method: Epidural              Aspers Delivery      Head delivery date/time: 2025 14:57:09   Delivery date/time:  25 14:57:22   Delivery type: Normal spontaneous vaginal delivery    Details:     Delivery location: delivery room       Delivery Providers    Delivering Clinician: Cole Hawkins MD   Delivery personnel:  Provider Role   Sudha Owen, MALCOLM Baby Nurse   Vane Jenkins, RN Delivery Nurse             Cord    Vessels: 3 Vessels  Complications: None  # of loops: 0  Timed cord clamping: Yes  Time in sec: 30  Cord blood disposition: to lab  Gases sent?: No       Resuscitation    Method: None       Aspers Measurements      Weight: 3690 g 8 lb 2.2 oz                Placenta    Date/time: 2025 14:59  Removal: Spontaneous  Appearance: Intact  Disposition: held for future pathology       Apgars    Living status: Living   Apgar Scoring Key:    0 1 2    Skin color Blue or pale Acrocyanotic Completely pink    Heart rate Absent <100 bpm >100 bpm    Reflex irritability No response Grimace Cry or active withdrawal    Muscle tone Limp Some flexion Active  motion    Respiratory effort Absent Weak cry; hypoventilation Good, crying              1 Minute:  5 Minute:  10 Minute:  15 Minute:  20 Minute:      Skin color: 0  1       Heart rate: 2  2       Reflex irritablity: 2  2       Muscle tone: 2  2       Respiratory effort: 2  2       Total: 8  9          Apgars assigned by: HEENA MENARD RN   disposition: with mother       Skin to Skin    Skin to skin with: Mother       Vaginal Count    Initial count RN: Vane Jenkins RN  Initial count Tech: Laura Michael RN   Sponges   Sharps    Initial counts 11   0    Final counts               Lacerations    Episiotomy: None  Perineal lacerations: None      Periurethral laceration: bilateral Repaired?: Yes     Vaginal laceration?: No      Cervical laceration?: No    Clitoral laceration?: No    Quantitative blood loss (mL): 325              Vaginal Delivery Note          Maynor Stevensbenny Patient Status:  Inpatient    1985 MRN [id number]   [facility] LABOR & DELIVERY Attending Jennifer Pretty,    Hosp Day # 1 PCP Andreia Aguilera MD     Date of Delivery: 25    Pre Op Dx:  IUP at Term    Post Op Dx: Same - delivered    Op: Normal Spontaneous Vaginal Delivery    Surgeon: Cole Hawkins MD      Anesthesia: Epidural then additional local anesthesia for repari    Indications:  Patient is a 39 year old  at 39w2d who presented for induction of labor for advanced maternal age. She was induced with 2 doses of cytotec then augmented with amniotomy and additional pitocin. She received an epidural for anesthesia. She progressed to complete cervical dilation. She pushed with 1 contraction.     Findings:    Sex: female     Weight:3.69 kg (8 lbs 2.2 oz)      Apgars: 8/9      Lacerations: superior periurethral, bilateral labial lacerations     QBL: 325 mL  Uterotonics: Although blood loss does not meet criteria for postpartum hemorrhage per ACOG, given presence of brisk uterine bleeding to intermittent lower  uterine segment atony, cytotec as uterotonic was administered.       Procedure:  The patients was placed in the dorsolithotomy position and prepped.  She was encouraged to push.  As the head was delivered in straight OA position, the legs were lowered and the perineum was supported to decrease the risk of tearing. The shoulders rotated easily and delivery was completed without complication.  Bulb suction was performed.  The cord was doubly clamped then cut after 30 seconds of cord pulsation noted.  The baby was placed on the mother's abdomen at her request.  The cord blood was sampled. Placenta delivered spontaneosly by uterine massage. IV Pitocin was initiated.      The perineum, vaginal mucosa and cervix was then examined. The bilateral labial abrasions were repaired with 3-0 vicryl suture in a single figure-of-eight stitch respectively. A byrne catheter was placed to ensure urethral patency. The superior periurethral abrasion was repaired with a single 3-0 vicryl suture in a figure-of-eight stitch.  The uterus was explored and evacuation of blood clots noted. Cytotec 1000 mcg [place] was placed for additional hemostasis. Uterus noted to be firm. Excellent hemostasis noted. Bleeding was minimal.  The patient was then moved to the supine position in stable condition.  Sponge and instrument counts were correct.    Complications:  None    Patient and infant in good condition.    Cole Hawkins MD   [facility] - OBGYN      Note to patient and family   The 21st Century Cures Act makes medical notes available to patients in the interest of transparency.  However, please be advised that this is a medical document.  It is intended as lids-pf-yvfv communication.  It is written and medical language may contain abbreviations or verbiage that are technical and unfamiliar.  It may appear blunt or direct.  Medical documents are intended to carry relevant information, facts as evident, and the clinical opinion of the practitioner.

## 2025-04-12 NOTE — H&P
[facility]  Obstetrics and Gynecology  History & Physical    Maynor Prater Patient Status:  Inpatient    1985 MRN [id number]   Location [facility] LABOR & DELIVERY Attending Jennifer Pretty,    Hospital Day 0 PCP Andreia Aguilera MD     CC: Patient is here for IOL 2/2 AMA    SUBJECTIVE:    Maynor Prater is a 39 year old  female at 39w1d Estimated Date of Delivery: 25 who is being admitted for IOL 2/2 AMA. Her current obstetrical history is significant for AMA, meningioma (cleared for vaginal delivery), Late PNC, failed IUD (pulled at 14 weeks), obesity (BMI 45), anemia, yeast and BV in pregnancy, and GBS positive. Patient reports no complaints.     negative UCx.    negative VB.   negative LOF.    positive Fetal movement.   negative Nausea, Vomiting, headache, vision changes and RUQ/Epigastric pain.         ETHAN Confirmation  LMP: No LMP recorded. Patient is pregnant.  ETHAN: 2025, by Ultrasound       Obstetric History:   OB History    Para Term  AB Living   5 3 3 0 1 3   SAB IAB Ectopic Multiple Live Births   1 0 0  3      # Outcome Date GA Lbr Oswaldo/2nd Weight Sex Type Anes PTL Lv   5 Current            4 Term 18 40w6d 09:40 / 00:05 7 lb 10.6 oz (3.475 kg) F NORMAL SPONT EPI N ALONDRA      Complications: Variable decelerations   3 Term 17 39w4d 03:39 / 00:05 7 lb 11.6 oz (3.505 kg) M NORMAL SPONT EPI N ALONDRA      Birth Comments: Time of Delivery:2:04 pm  Mother's age:32  Maternal Blood Type: A  Maternal RH Factor: +  :3  Para:20  Bili:5.6      Biliruben: 7.9      Complications: Variable decelerations   2 Term 09/11/15   7 lb 8 oz (3.402 kg) F Vag-Spont EPI N ALONDRA   1 SAB 11 12w0d   U          Birth Comments: D[facility]C @ [facility]      Obstetric Comments           Past Medical History: Past Medical History[1]  Past Social History: Past Surgical History[2]  Family History: Family History[3]  Social History:   Social History     Tobacco Use    Smoking  status: Former     Current packs/day: 0.00     Types: Cigarettes     Quit date: 2/3/2009     Years since quittin.1    Smokeless tobacco: Former    Tobacco comments:     per [employer] - no   Substance Use Topics    Alcohol use: No     Alcohol/week: 0.0 standard drinks of alcohol     Comment: None.        Home Meds: Prescriptions Prior to Admission[4]  Allergies: Allergies[5]    OBJECTIVE:    Temp:  [98.7 °F (37.1 °C)] 98.7 °F (37.1 °C)  Pulse:  [94] 94  Resp:  [15] 15  BP: (135)/(83) 135/83  Body mass index is 45.84 kg/m².    General: AAO. NAD  Lungs: chest clear, no wheezing, rales, normal symmetric air entry  CV: S1, S2 normal, no murmur, click, rub or gallop, regular rate and rhythm  Abdomen: FHT present, gravid   Extremities: negative edema bilaterally, negative calf tenderness bilaterally, Frantz's sign negative bilaterally     FHT: moderate variability/125 BPM / Positive accelerations/Negative decelerations   TOCO: rare    SVE:  / -2    Leopolds: cephalic, EFW 7 lbs 0 oz    Prenatal Labs Brief Review   Blood Type:   Lab Results   Component Value Date    ABO A 2025    RH Positive 2025     GBS:  Positive      Inpatient labs:  Lab Results   Component Value Date    WBC 10.1 2025    HGB 9.8 2025    HCT 30.5 2025    .0 2025       ASSESSMENT/ PLAN:    Maynor Prater is a 39 year old  female at 39w1d Estimated Date of Delivery: 25 who is being admitted for IOL 2/ AMA.    Patient Active Problem List    Diagnosis    Pregnancy (HCC)    Positive GBS test    Group B streptococcal infection in pregnancy (HCC)     3/2025      Iron deficiency anemia     [ ] Hematology       Elevated blood pressure affecting pregnancy, antepartum (HCC)     - Mild range BP x1 148/72 at 23w4d  25 normal today      Late prenatal care (HCC)     Dating by 18 wk US.    - delivery by due date, antepartum testing and growth US's      Retained intrauterine contraceptive device (IUD)      Failed IUD.  Pulled at 14 wks of pregnancy      Obesity affecting pregnancy, antepartum (HCC)     [ x ] Early glucola- normal  - MFM, Level 2  [x] Failed 1 hr, normal 3hr GTT      Anxiety and depression     History.  With PTSD from marital problems, sudden loss of family member.  Briefly treated but not recently.      AMA (advanced maternal age) multigravida 35+ (HCC)     Declines aneuploidy screening  [ x ] MFM/Level 2- incomplete  [  ] BASA until 37 weeks   Follow-up in 2 to 3 weeks to complete views  Monthly growth ultrasounds starting at 28 weeks with the addition of a BPP with each growth assessment 32 weeks and beyond  Weekly NST's at 32 weeks.  Twice weekly testing at 38 weeks - weekly NST and weekly BPP.  Delivery at 39-40 weeks.  She was advised to limit gestational weight gain to less than 20 pounds  25 pt declined to see MFM due to insurance; plan to f/u on ultrasound with our clinic; pt needs to schedule      Meningioma (HCC)     Ok for vaginal delivery. Consider Neuro if symptoms   BELOW FROM FIRST PREGNANCY-   5/21/15-- note from Dr Stoner-- 5-10mm meningioma incidental finding.  pt ok for   3/13 Saw Dr. dale at [facility]. Had asked for consult letter to mailed to us. Will request again.  Pt will meet with Neuro to discuss recurrence of dizziness sx since off of Klonopin and Effexor and delivery mgmt.      Reaction to tuberculin skin test without active tuberculosis     Multiple negative CXR's      Vertigo       1. Labor:   - Cervical ripening: Cytotec 25 mcg per vagina every 4 hrs x 3-4 doses   - Plan to initiate Pitocin per protocol following cervical ripening  2. Fetal monitoring: CEFM  3. GBS: positive, ampicillin per protocol  4. Pain: May have IV pain medication for epidural per patient request    BV/Yeast  - continue flagyl intrapartum  - give one dose oral diflucan for yeast infection as pt states she did not take meds yet    Risks, benefits, alternatives and possible  complications have been discussed in detail with the patient.  Pre-admission, admission, and post admission procedures and expectations were discussed in detail.  All questions answered, all appropriate consents will be signed at the Hospital. Admission is planned for today.  anticipated.    Jennifer Pretty DO   [facility] - OBGYN          Note to patient and family   The  Cures Act makes medical notes available to patients in the interest of transparency.  However, please be advised that this is a medical document.  It is intended as xsze-kb-nrtz communication.  It is written and medical language may contain abbreviations or verbiage that are technical and unfamiliar.  It may appear blunt or direct.  Medical documents are intended to carry relevant information, facts as evident, and the clinical opinion of the practitioner.        [1]   Past Medical History:   Anxiety    Anxiety state, unspecified    Benign brain tumor (HCC)    Meningioma     Depression    History of meningioma    Hx of abnormal cervical Pap smear    Hx of varicella    in childhood     Morbid obesity with BMI of 40.0-44.9, adult (HCC)    PCOS (polycystic ovarian syndrome)    Pneumonia    PTSD (post-traumatic stress disorder)    TB (tuberculosis)    + skin test, neg chest xray    TB lung, latent    Urinary incontinence    stress incontinence    Viral meningitis (HCC)   [2]   Past Surgical History:  Procedure Laterality Date    D & c      Eye muscle surg proc unlisted Bilateral     Laparoscopic cholecystectomy  03/15/16    Lasik  2008    Removal gallbladder      Tonsillectomy      Upper gi endoscopy; w/endoscopic u/s esophageal exam     [3]   Family History  Problem Relation Age of Onset    Diabetes Father     High Cholesterol Father     Lipids Father         hyperlipidemia    Other (Other) Father         Gallbladder disease, ERCP with stent    High Blood Pressure Mother     Hypertension Mother    [4]   Medications Prior to  Admission   Medication Sig Dispense Refill Last Dose/Taking    Prenatal Vit-DSS-Fe Cbn-FA (PRENATAL AD OR) Take by mouth.   4/11/2025    metroNIDAZOLE 500 MG Oral Tab Take 1 tablet (500 mg total) by mouth in the morning and 1 tablet (500 mg total) before bedtime. Do all this for 7 days. 14 tablet 0     fluconazole 150 MG Oral Tab  (Patient not taking: Reported on 4/8/2025)      [5]   Allergies  Allergen Reactions    Lamictal OTHER (SEE COMMENTS)     Hyptension and bradycardia     Lexapro [Escitalopram] OTHER (SEE COMMENTS)     Suicidal ideation

## 2025-04-12 NOTE — PROGRESS NOTES
[facility]  Obstetrics and Gynecology    OB/GYN: Intrapartum Progress Note     SUBJECTIVE:  Feeling her contractions more.     OBJECTIVE:  Vitals:    25 2330 25 0345 25 0415 25 0430   BP: 120/66 151/80 145/78 144/84   Pulse: 82 93 80 95   Resp: 16 18     Temp: 98.5 °F (36.9 °C) 98.3 °F (36.8 °C)     TempSrc: Oral Oral     SpO2: 95%      Weight:       Height:           Physical Exam:  General: AAO. NAD.   Fundus: gravid.    FHT: baseline 140s, moderate variability, + accelerations, + intermittent early and variable decelerations    Pearson: q 2-3 min  SVE: /-2    Interventions Performed    SVE   Amniotomy - 08:06 - clear    ASSESSMENT/PLAN:  Patient is a 39 year old  female at 39w2d admitted for IOL.     Labor  - Cytotec x2, now augmented with amniotomy  - Pain: no CHINTAN, using dilaudid  - EFW: 3/28 - 81%, AC 94.5%  - RH+, RI.   - GBS positive - ampicillin  - Antepartum conditions   Obesity - BMI 46   AMA    Late PNC - d/b 18 week US   Anxiety/depression, history of PTSD   Meningioma - cleared for vaginal delivery   Reaction to tuberculin skin tests without active TB with multiple negative CXRs   Vertigo     Plan: Continue management of labor.     Cole Hawkins MD   [facility] - OBGYN

## 2025-04-12 NOTE — PROGRESS NOTES
NURSING ADMISSION NOTE      Patient admitted via Wheelchair  Oriented to room.  Safety precautions initiated.  Bed in low position.  Call light in reach.  Both mom/ infant ID bands verified, hugs on, teaching initiated, care plan discussed.

## 2025-04-12 NOTE — PROGRESS NOTES
Pt transferred to MB unit per LIP order.  Pt transferred via wheelchair, in stable condition, with all personal belongings and accompanied by FOB and holding Baby Kishta.  Report given to MB RN.

## 2025-04-12 NOTE — PLAN OF CARE
Problem: BIRTH - VAGINAL/ SECTION  Goal: Fetal and maternal status remain reassuring during the birth process  Description: INTERVENTIONS:- Monitor vital signs- Monitor fetal heart rate- Monitor uterine activity- Monitor labor progression (vaginal delivery)- DVT prophylaxis (C/S delivery)- Surgical antibiotic prophylaxis (C/S delivery)  Outcome: Progressing     Problem: PAIN - ADULT  Goal: Verbalizes/displays adequate comfort level or patient's stated pain goal  Description: INTERVENTIONS:- Encourage pt to monitor pain and request assistance- Assess pain using appropriate pain scale- Administer analgesics based on type and severity of pain and evaluate response- Implement non-pharmacological measures as appropriate and evaluate response- Consider cultural and social influences on pain and pain management- Manage/alleviate anxiety- Utilize distraction and/or relaxation techniques- Monitor for opioid side effects- Notify MD/LIP if interventions unsuccessful or patient reports new pain- Anticipate increased pain with activity and pre-medicate as appropriate  Outcome: Progressing     Problem: ANXIETY  Goal: Will report anxiety at manageable levels  Description: INTERVENTIONS:- Administer medication as ordered- Teach and rehearse alternative coping skills- Provide emotional support with 1:1 interaction with staff  Outcome: Progressing

## 2025-04-13 ENCOUNTER — ANESTHESIA EVENT (OUTPATIENT)
Dept: OBGYN CLINIC | Facility: HOSPITAL | Age: 40
End: 2025-04-13
Payer: COMMERCIAL

## 2025-04-13 VITALS
OXYGEN SATURATION: 73 % | SYSTOLIC BLOOD PRESSURE: 118 MMHG | BODY MASS INDEX: 45.64 KG/M2 | DIASTOLIC BLOOD PRESSURE: 65 MMHG | WEIGHT: 284 LBS | HEART RATE: 75 BPM | TEMPERATURE: 98 F | HEIGHT: 66 IN | RESPIRATION RATE: 18 BRPM

## 2025-04-13 LAB
BASOPHILS # BLD AUTO: 0.03 X10(3) UL (ref 0–0.2)
BASOPHILS NFR BLD AUTO: 0.2 %
EOSINOPHIL # BLD AUTO: 0.17 X10(3) UL (ref 0–0.7)
EOSINOPHIL NFR BLD AUTO: 1.3 %
ERYTHROCYTE [DISTWIDTH] IN BLOOD BY AUTOMATED COUNT: 14.7 %
HCT VFR BLD AUTO: 29.2 % (ref 35–48)
HGB BLD-MCNC: 9.5 G/DL (ref 12–16)
IMM GRANULOCYTES # BLD AUTO: 0.06 X10(3) UL (ref 0–1)
IMM GRANULOCYTES NFR BLD: 0.4 %
LYMPHOCYTES # BLD AUTO: 2.52 X10(3) UL (ref 1–4)
LYMPHOCYTES NFR BLD AUTO: 18.9 %
MCH RBC QN AUTO: 25.2 PG (ref 26–34)
MCHC RBC AUTO-ENTMCNC: 32.5 G/DL (ref 31–37)
MCV RBC AUTO: 77.5 FL (ref 80–100)
MONOCYTES # BLD AUTO: 0.62 X10(3) UL (ref 0.1–1)
MONOCYTES NFR BLD AUTO: 4.6 %
NEUTROPHILS # BLD AUTO: 9.94 X10 (3) UL (ref 1.5–7.7)
NEUTROPHILS # BLD AUTO: 9.94 X10(3) UL (ref 1.5–7.7)
NEUTROPHILS NFR BLD AUTO: 74.6 %
PLATELET # BLD AUTO: 303 10(3)UL (ref 150–450)
RBC # BLD AUTO: 3.77 X10(6)UL (ref 3.8–5.3)
WBC # BLD AUTO: 13.3 X10(3) UL (ref 4–11)

## 2025-04-13 RX ORDER — METRONIDAZOLE 500 MG/1
500 TABLET ORAL 2 TIMES DAILY
Status: DISCONTINUED | OUTPATIENT
Start: 2025-04-13 | End: 2025-04-13

## 2025-04-13 RX ORDER — FLUCONAZOLE 150 MG/1
150 TABLET ORAL DAILY
Status: DISCONTINUED | OUTPATIENT
Start: 2025-04-13 | End: 2025-04-13

## 2025-04-13 NOTE — PLAN OF CARE
Problem: POSTPARTUM  Goal: Establishment of adequate milk supply with medication/procedure interruptions  Description: INTERVENTIONS:- Review techniques for milk expression (breast pumping). - Provide pumping equipment/supplies, instructions, and assistance until it is safe to breastfeed infant.  2025 by Myron Jack RN  Outcome: Progressing     Problem: POSTPARTUM  Goal: Experiences normal breast weaning course  Description: INTERVENTIONS:- Assess for and manage engorgement.- Instruct on breast care.- Provide comfort measures.  2025 by Myron Jack RN  Outcome: Completed  2025 by Myron Jack RN  Outcome: Progressing     Problem: POSTPARTUM  Goal: Appropriate maternal -  bonding  Description: INTERVENTIONS:- Assess caregiver- interactions.- Assess caregiver's emotional status and coping mechanisms.- Encourage caregiver to participate in  daily care.- Assess support systems available to mother/family.- Provide /case management support as needed.  2025 by Myron Jack RN  Outcome: Completed  2025 by Myron Jack RN  Outcome: Progressing

## 2025-04-13 NOTE — PROGRESS NOTES
Labor Analgesia Follow Up Note    Patient underwent epidural anesthesia for labor analgesia,    Placenta Date/Time: 4/12/2025  2:59 PM    Delivery Date/Time:: 4/12/2025  2:57 PM    /65 (BP Location: Left arm)   Pulse 75   Temp 98.4 °F (36.9 °C) (Oral)   Resp 18   Ht 1.676 m (5' 6\")   Wt 128.8 kg (284 lb)   SpO2 (!) 73%   Breastfeeding Yes   BMI 45.84 kg/m²     Assessment:  Patient seen and no apparent anesthesia related complications.    Thank you for asking us to participate in the care of your patient.

## 2025-04-13 NOTE — DISCHARGE INSTRUCTIONS
[facility] DISCHARGE INSTRUCTIONS     CONGRATULATIONS on the birth of your baby!!  We are happy that we have been able to be of service to you during your pregnancy.  We trust that your experience in the hospital and with the birth of your child has been a pleasant one.  These instructions are for your reference concerning your care at home between now and your six-week check-up.  Please call the office within the next few days to schedule your appointment.    REST  Since fatigue will probably be your biggest problem, you should try to rest in the morning and in the afternoon for at least 1½-2 hours if possible.  Getting up in the middle of the night to feed your baby interrupts your sleep cycle; two 4 hour periods of sleep do not equal one 8 hour period.  During the day while your baby is sleeping, do whatever you can to isolate yourself from the rest of the world so you can rest (i.e. turn off the phone ringer, put a sign on the front doorbell).    POSTPARTUM BLUES/DEPRESSION  Mood swings, crying spells, feeling overwhelmed and irritability are very common after childbirth.  Most women (50-80%) will experience some of these symptoms, which can last from a few days to 2 weeks.  If your symptoms last more than 2 weeks and/or include any of the following, you may have postpartum depression:  -feelings of sadness      -inability to care for yourself or your baby  -loss of interest in usual activities     -recurring thoughts of death/suicide  -difficulty sleeping or increased need for sleep   -feelings of worthlessness/hopelessness    Postpartum depression occurs in 8-12% of new mothers and is more common in mothers with a past history of depression.  If you have any of these symptoms and/or they persist for more than 2 weeks, please call our office/answering service or [facility] at [phone].    BREAST FEEDING  It is important to be in a relaxed atmosphere when you  are nursing.  You will find that your breasts will engorge and become tender within the next few days.  Even though the suction your baby creates may not meet your expectations, remember that he/she is just learning.  If your breasts remain hard after nursing, you may use a breast pump to release the remaining milk.  This will stimulate your breasts to produce more milk when the need arises.  If your nipples become sore, you should use Lanisol (lanolin) cream and allow your nipples to air-dry after nursing.  It will take approximately 10-12 days for milk supply and demand to balance, so please be patient.  Breastfeeding requires ample rest, fluids (8-10 glasses of water/day) and adequate calories (approximately 2200cal/day).  Please continue your prenatal vitamins the entire time you are breastfeeding.  The lactation consultants at [facility] are available at [phone] to answer any questions or help with any problems.    BREAST CARE  We advise non-nursing mothers to continuously wear a tight-fitting bra and avoid any nipple/breast stimulation.  You should keep your bra as tight as you can tolerate, as this will help dry up your milk.  If your breasts are painful and feel engorged, you may use ice bags and Motrin for relief.  The engorgement and pain/discomfort will usually resolve within 1 week.  You may have some milky discharge from your breasts for several weeks.           EPISIOTOMY CARE  If you have an episiotomy/tear, the stitches used to close the incision will dissolve by themselves.  This process will take at least six weeks during which you should be careful with the area.  If peripads tend to chafe and irritate your skin, we recommend that you place a Tucks, a gauze filled with soothing medication, between your stitches and the peripad.  Tucks can by purchased at your local pharmacy.  Sitz baths may also aid in pain relief and healing; soak your bottom in a tub filled with room temperature water for 20  minutes once or twice per day.  We prefer that you take showers rather than baths, and avoid swimming, for 4-6 weeks after delivery.    SEXUAL INTERCOURSE  Please avoid tampons, douching and intercourse for at least 4-6 weeks, or until you have stopped bleeding, whichever is longer.  When you do resume intercourse, realize that you can start ovulating/become pregnant as early as 2-3 weeks after delivery, even if you are breastfeeding.  Please ask one of your doctors or call the office if you have any questions or desire contraception for use before your six-week check-up.  You may notice more vaginal dryness than usual, especially if you are breastfeeding.  An over-the-counter lubricant such as Replens or Astroglide may provide some relief.  Pain/discomfort at the episiotomy site is not unusual and may last anywhere from 2 weeks to several months.  Massaging the area may help to soften the scar tissue and hasten the healing process.    MENSTRUATION  You may have a vaginal discharge/light bleeding for anywhere from 2-6 weeks after delivery.  The flow may taper off and then suddenly become heavier a few weeks later.  Your first real period may come any time from 4-12 weeks after delivery, but may not come at all if you are nursing.    EXERCISE  We recommend that you avoid strenuous exercise or housework for at least 3-6 weeks after delivery. Realize that your exercise tolerance will be reduced, and therefore you should start slowly and increase gradually.  Walking is acceptable, unless it causes too much discomfort or fatigue.  You should climb stairs the least amount possible for the first week or two after delivery.  When you do climb them, take them slowly.  Avoid making numerous trips when you can organize and make just one.  You should also avoid driving a car, if possible, for the first 1-2 weeks.  Kegels exercises are important to maintain good pelvic muscle tone and help eliminate any urine leakage.  The  exercises involve tightening the muscles around the vagina; try stopping urination in midstream or squeezing around your fingers to learn the correct muscle groups.  Do these exercises several times a day in repetitions of 10; try doing them every time you get to a stoplight or a commercial comes on the TV.    VITAMINS  We strongly encourage you to continue taking your prenatal vitamin until your six-week check-up or until you stop nursing, whichever is longer.  The iron in the vitamin will help to resolve any anemia from the blood loss of the delivery.  Adequate calcium intake is important for all women, but especially while nursing.  Your total calcium intake should be 1200mg/day.  Since the average women gets approximately 500mg in her diet, most women will require 500-700mg of supplemental calcium/day.  You can purchase a supplement such as Tums Ex, Citracal, Oscal or Viactin at your local pharmacy.    WE WISH YOU MUCH HAWA WITH YOUR NEW BABY AND MAY YOUR NIGHTS BE RESTFUL!!        Please check blood pressure at home at least 2-3 times per day   Please record blood pressure values     Return to office/hospital if you see that your blood pressure is elevated.     Elevated Blood Pressure is:    Systolic (top number) greater than or equal to 160    And/Or    Diastolic (bottom number) greater than or equal to 110       Return to office/hospital if you experience nausea, vomiting, headache, vision changes and RUQ/epigastric pain.

## 2025-04-13 NOTE — PLAN OF CARE
Problem: POSTPARTUM  Goal: Optimize infant feeding at the breast  Description: INTERVENTIONS:- Initiate breast feeding within first hour after birth. - Monitor effectiveness of current breast feeding efforts.- Assess support systems available to mother/family.- Identify cultural beliefs/practices regarding lactation, letdown techniques, maternal food preferences.- Assess mother's knowledge and previous experience with breast feeding.- Provide information as needed about early infant feeding cues (e.g., rooting, lip smacking, sucking fingers/hand) versus late cue of crying.- Discuss/demonstrate breast feeding aids (e.g., infant sling, nursing footstool/pillows, and breast pumps).- Encourage mother/other family members to express feelings/concerns, and actively listen.- Educate father/SO about benefits of breast feeding and how to manage common lactation challenges.- Recommend avoidance of specific medications or substances incompatible with breast feeding.- Assess and monitor for signs of nipple pain/trauma.- Instruct and provide assistance with proper latch.- Review techniques for milk expression (breast pumping) and storage of breast milk. Provide pumping equipment/supplies, instructions and assistance, as needed.- Encourage rooming-in and breast feeding on demand.- Encourage skin-to-skin contact.- Provide LC support as needed.- Assess for and manage engorgement.- Provide breast feeding education handouts and information on community breast feeding support.   Outcome: Progressing  Goal: Establishment of adequate milk supply with medication/procedure interruptions  Description: INTERVENTIONS:- Review techniques for milk expression (breast pumping). - Provide pumping equipment/supplies, instructions, and assistance until it is safe to breastfeed infant.  Outcome: Progressing  Goal: Appropriate maternal -  bonding  Description: INTERVENTIONS:- Assess caregiver- interactions.- Assess caregiver's  emotional status and coping mechanisms.- Encourage caregiver to participate in  daily care.- Assess support systems available to mother/family.- Provide /case management support as needed.  Outcome: Progressing

## 2025-04-13 NOTE — PROGRESS NOTES
[facility]  Obstetrics and Gynecology   Postpartum Progress Note    Subjective:     Patient reports doing well. Baby is also doing well. Vaginal bleeding is minimal and improving. She is able to tolerate food and drink without nausea, abdominal pain, or GERD symptoms. She is breastfeeding. She denies emotional concerns. She is interested in discharge to home today.     Review of Systems:  General:  denies fevers, chills, fatigue and malaise.   Respiratory:  denies SOB, dyspnea, cough or wheezing  Cardiovascular:  denies chest pain, palpitations, exercise intolerance   GI: denies abdominal pain, diarrhea, constipation  :  denies dysuria, hematuria, increased urinary frequency.  denies abnormal uterine bleeding or vaginal discharge.       Objective:     Vitals:    25 1730 25 1828 25 1950 25 0830   BP: 128/73 112/70 115/72 118/65   Pulse: 91 101 89 75   Resp:  17 16 18   Temp:  99.4 °F (37.4 °C) 98.3 °F (36.8 °C) 98.4 °F (36.9 °C)   TempSrc:  Oral Oral Oral   SpO2:       Weight:       Height:             Body mass index is 45.84 kg/m².    General: AAO.NAD.   CVS exam: normal peripheral perfusion  Chest: non-labored breathing, no tachypnea   Abdominal exam: soft, nontender, nondistended  Pelvic exam:   VULVA: normal appearing vulva with no masses, tenderness or lesions  PERINEUM: intact, well healed, no signs of infection.   Ext: non-tender, no edema    Labs:  Lab Results   Component Value Date    WBC 13.3 2025    HGB 9.5 2025    HCT 29.2 2025    .0 2025    CREATSERUM 0.61 2025    BUN 7 2025     2025    K 4.0 2025     2025    CO2 22.0 2025     2025    CA 9.3 2025    ALB 4.0 2025    ALKPHO 94 2025    BILT 0.8 2025    TP 6.6 2025    AST 17 2025    ALT 13 2025         Assessment:     Maynor Prater is a 39 year old  female who presents for postpartum  visit   Problem List[1]      Plan:     Maynor Prater is a 39 year old  female who is PPD#1 s/p  on 2025 after IOL for AMA.     Antepartum conditions  - Intrapartum GHTN - PIH labs WNL, p:c 0.21    1 severe range at time of delivery with nontreatable repeat, BP normotensive since delivery   Patient plans to continue to monitor BP at home.   - Meningioma - cleared for vaginal delivery  - candida and BV - meds re-ordered   - Anemia - Hgb 9.8>9.5  - Obesity, BMI 45    Postpartum exam   - doing well, no complaints   - no abnormal findings on physical exam   - may return to normal activity     Contraception counseling   - discussion held with patient about family planning and contraception  - pt wishes to defer discussion until in-office postpartum appointment    All of the findings and plan were discussed with the patient.  She notes understanding and agrees with the plan of care.  All questions were answered to the best of my ability at this time.     RTC for 1 week BP check and 6 week postpartum appointment    Cole Hawkins MD   [facility] - OBGYN         Note to patient and family   The  [place] Cures Act makes medical notes available to patients in the interest of transparency.  However, please be advised that this is a medical document.  It is intended as [id number] communication.  It is written and medical language may contain abbreviations or verbiage that are technical and unfamiliar.  It may appear blunt or direct.  Medical documents are intended to carry relevant information, facts as evident, and the clinical opinion of the practitioner.                 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

## 2025-04-13 NOTE — PROGRESS NOTES
Discharge patient home as order. Teaching complete, patient feel comfortable in taking care of herself and  infant. Hugs off, send both mom and infant to their family car @ [phone].

## 2025-04-14 ENCOUNTER — PATIENT OUTREACH (OUTPATIENT)
Dept: CASE MANAGEMENT | Age: 40
End: 2025-04-14

## 2025-04-14 NOTE — PROGRESS NOTES
Hospital Follow up for Post partum (Discharge 4/13 edw)     Cole Wooten ; Follow up 3-5 days BP check/ 6w p/p   [address]   [facility] [address]   [phone]   Delivery Date:4/12/2025   Delivery Type: Normal spontaneous vaginal delivery   Attempt #1:  Left message on voicemail for patient to call transitions specialist back to schedule follow up appointments. Provided Transitions specialist scheduling phone number [phone].

## 2025-04-15 ENCOUNTER — TELEPHONE (OUTPATIENT)
Dept: OBGYN UNIT | Facility: HOSPITAL | Age: 40
End: 2025-04-15

## 2025-04-15 NOTE — PROGRESS NOTES
Hospital Follow up for Post partum (Discharge 4/13 edw)     Cole Wooten ; Follow up 3-5 days BP check/ 6w p/p   [address]   [facility] [address]   [phone]   Delivery Date:4/12/2025   Delivery Type: Normal spontaneous vaginal delivery   BP Check pt declined   Appt made for 6w p/p 5/28@9:15am     Confirmed with pt   Closing encounter

## 2025-04-18 ENCOUNTER — PATIENT MESSAGE (OUTPATIENT)
Dept: OBGYN CLINIC | Facility: CLINIC | Age: 40
End: 2025-04-18

## 2025-04-28 NOTE — TELEPHONE ENCOUNTER
Patient has 15 min PP visit scheduled with you on 5/28. She is requesting IUD insertion at this appointment d/t loss of insurance coverage. Pleas advise if this can be done at this appointment or patient needs additional appointment?

## 2025-04-28 NOTE — TELEPHONE ENCOUNTER
Cole Hawkins MD to [facility] Nurse  Me (Selected Message)        4/28/25  9:37 AM  I would not place an IUD until 10 weeks postpartum - can direct patient to external resources such as [facility] where she would be able to obtain an IUD but increased risk of uterine perforation until about 10 weeks postpartum

## 2025-05-27 ENCOUNTER — POSTPARTUM (OUTPATIENT)
Dept: OBGYN CLINIC | Facility: CLINIC | Age: 40
End: 2025-05-27
Payer: COMMERCIAL

## 2025-05-27 ENCOUNTER — TELEPHONE (OUTPATIENT)
Dept: OBGYN CLINIC | Facility: CLINIC | Age: 40
End: 2025-05-27

## 2025-05-27 VITALS
HEIGHT: 66 IN | WEIGHT: 266.5 LBS | HEART RATE: 94 BPM | DIASTOLIC BLOOD PRESSURE: 84 MMHG | SYSTOLIC BLOOD PRESSURE: 122 MMHG | BODY MASS INDEX: 42.83 KG/M2

## 2025-05-27 DIAGNOSIS — Z30.011 INITIATION OF ORAL CONTRACEPTION: ICD-10-CM

## 2025-05-27 PROCEDURE — 3079F DIAST BP 80-89 MM HG: CPT | Performed by: NURSE PRACTITIONER

## 2025-05-27 PROCEDURE — 3008F BODY MASS INDEX DOCD: CPT | Performed by: NURSE PRACTITIONER

## 2025-05-27 PROCEDURE — 3074F SYST BP LT 130 MM HG: CPT | Performed by: NURSE PRACTITIONER

## 2025-05-27 RX ORDER — ACETAMINOPHEN AND CODEINE PHOSPHATE 120; 12 MG/5ML; MG/5ML
0.35 SOLUTION ORAL DAILY
Qty: 84 TABLET | Refills: 3 | Status: SHIPPED | OUTPATIENT
Start: 2025-05-27

## 2025-05-27 NOTE — TELEPHONE ENCOUNTER
Patient says she was supposed to have birth control sent to her pharmacy however they told her they haven't received anything.

## 2025-05-27 NOTE — PROGRESS NOTES
Subjective:  Chief Complaint   Patient presents with    Postpartum Care     Maynor Prater presents for her 6 week post partum exam after vaginal delivery.  She denies any gastrointestinal/genitourinary complaints.  She denies any symptoms of depression.  Infant doing well.  Nursing without difficulty.  No fever, vaginal discharge, or abdominal pain.  No further lochia.    [place]  Depression Scale (EPDS) 0  No gestational diabetes    Objective:  /84   Pulse 94   Ht 66\"   Wt 266 lb 8 oz (120.9 kg)   LMP 2025   Breastfeeding Yes   BMI 43.01 kg/m²   Physical Exam:  Abdomen: Soft, non-tender, non-distended, no masses.  Pelvic:    External genitalia- Normal, Bartholin's, urethra, skeins glands normal   Vagina- No vaginal lesions, no discharge   Cervix- No lesions, long/closed, no cervical motion tenderness   Uterus- Normal size, non-tender, no masses   Adnexa-  Non-tender, no masses  Perineum:  Normal  Extremities: Non-tender     Pap/HPV 2024     Assessment:  Normal post partum examination.  Doing well. OK to return to normal activities/work.    Plan:  Follow up one year for routine annual gynecologic examination.    Patient desires progestin only minipill.  12 month prescription with instructions and warnings.  No personal or family contraindications.      Diagnoses and all orders for this visit:    Postpartum care following vaginal delivery (HCC)    Initiation of oral contraception  -     Norethindrone 0.35 MG Oral Tab; Take 1 tablet (0.35 mg total) by mouth daily.        Return in about 1 year (around 2026) for well woman exam or sooner if needed.

## (undated) DIAGNOSIS — N92.6 MENSTRUAL ABNORMALITY: Primary | ICD-10-CM

## (undated) NOTE — LETTER
[address] [place] [address]  Dept: [phone]  Dept Fax: [phone]      January 11, 2018    Patient: Tyler John   Date of Visit: 1/11/2018       To Whom It May Concern:    Shane Capps was seen and t

## (undated) NOTE — Clinical Note
VACCINE ADMINISTRATION RECORD  PARENT / GUARDIAN APPROVAL  Date: 3/14/2017  Vaccine administered to: Usman Weaver     : 1985    MRN: [id number]    A copy of the appropriate Centers for Disease Control and Prevention Vaccine Information statement

## (undated) NOTE — Clinical Note
Thank you for the consult. I saw Ms. Isra La in the endocrine/diabetes clinic today. Please see attached my note. Please feel free to contact me with any questions. Thanks!

## (undated) NOTE — LETTER
AUTHORIZATION FOR SURGICAL OPERATION OR OTHER PROCEDURE    1. I hereby authorize Dr. Jay Flynn, and [address] [place] [address] staff assigned to my case to perform the following operation and/or procedure at the [address] [place] [address]:    IUD Insertion    2.   My ph Responsible person                          []  Spouse  In case of minor or                    [] Other  _____________   Incompetent name:  __________________________________________________                               (please print)      _____________

## (undated) NOTE — Clinical Note
Weekly NST at 34wks Growth US every 4-6wks in 3rd trimester Limit weight gain to ~10lbs for the pregnancy

## (undated) NOTE — LETTER
VACCINE ADMINISTRATION RECORD  PARENT / GUARDIAN APPROVAL  Date: 2018  Vaccine administered to: Nerissa Rosen     : 1985    MRN: [id number]    A copy of the appropriate Centers for Disease Control and Prevention Vaccine Information statement

## (undated) NOTE — LETTER
2018              Maynor ZEPEDA Moi        [id number]         Dear Amita Moya,  83, is pregnant and under my medical care.  Please excuse her from work for the duration of the p

## (undated) NOTE — ED AVS SNAPSHOT
[facility]   [place]N: [id number]    Department:  [facility] Emergency Department   Date of Visit:  10/12/2019           Disclosure     Insurance plans vary and the physician(s) referred by the ER may not be covered by your plan.  Please contact your tell this physician (or your personal doctor if your instructions are to return to your personal doctor) about any new or lasting problems. The primary care or specialist physician will see patients referred from the [facility] Emergency Department.  Shayla Hurtado

## (undated) NOTE — Clinical Note
VACCINE ADMINISTRATION RECORD  PARENT / GUARDIAN APPROVAL  Date: 3/14/2017  Vaccine administered to: Steph Mobley     : 1985    MRN: [id number]    A copy of the appropriate Centers for Disease Control and Prevention Vaccine Information statement

## (undated) NOTE — Clinical Note
Weekly NST at 34wks Growth US every 4-6wks in 3rd trimester Early diabetes testing.  Limit weight gain to ~10lbs for the pregnancy

## (undated) NOTE — IP AVS SNAPSHOT
[address]  [address] [facility] [phone]                Discharge Summary   5/27/2017    Margy Hyde           Admission Information        Provider Department    5/27/2017 Nat Varma MD [facility] [address]   [place] [address]  [phone]      Future Appointments     Jun 01, 2017  6:40 PM   Return OB with [place] [facility], [address] [facility], [address] ([place])    [phone] Breast massage / compression / manual expression techniques Active   Galagtogogues - contraindicators, side effects, and recommended to discuss their use with pediatric MD and OB prior to the start if applicable Active   Encourage the patient to view the S Ways to stimulate Milk Ejection Reflex (BAN) Active   Hands-on pumping Active   Pumping and breastfeeding frequency guidelines Active   Breastfeeding adequacy signs and symptoms Active   Signs and symptoms / prevention / management of sore nipples if appli Call Lactation Department with any questions/concerns [phone]      In the next few weeks you may be mailed a survey from us asking you to rate your experience here at [facility].  We value our patient's feedback, and would appreciate you taking a moment

## (undated) NOTE — ED AVS SNAPSHOT
Kam Miranda   MRN: [id number]    Department:  [facility] Emergency Department   Date of Visit:  1/12/2018           Disclosure     Insurance plans vary and the physician(s) referred by the ER may not be covered by your plan.  Please contact yo CARE PHYSICIAN AT ONCE OR RETURN IMMEDIATELY TO THE EMERGENCY DEPARTMENT. If you have been prescribed any medication(s), please fill your prescription right away and begin taking the medication(s) as directed.   If you believe that any of the medications

## (undated) NOTE — ED AVS SNAPSHOT
[facility] in [place] [address]    Phone:  [phone]    Fax:  [address]   MRN: [id number]    Department:  [facility] in [place]   Date of Visit:  2/1 have any questions regarding your home medications, including potential side effects. Medication List      Notice     You have not been prescribed any medications.             Discharge References/Attachments     INFLUENZA (FLU) AND PREGNANCY ( that Physician.   If you need additional assistance selecting a physician, you may call the [employer] [facility] Physician Referral and Class Registration line at [phone] or find a doctor online by visiting [email or web address].    IF THERE IS ANY CHANEY [facility] [address]  [place] [address]) [phone]                Additional Information       We are concerned for your overall well being:    - If you are a smoker or have smoked in the last 12 months, we encourage you to explore op

## (undated) NOTE — LETTER
[address] [place] [address]  Dept: [phone]  Dept Fax: [phone]      February 19, 2017    Patient: Claudy Rai   Date of Visit: 2/19/2017       To Whom It May Concern:    Anant Ruiz was seen and

## (undated) NOTE — LETTER
9/27/2018              Maynor Prater        [id number]         To Whom It May Concern,    Sade Gagnon is under my medical care. She is cleared to return to work as of 9/27/18 without restriction.      Kiki Cedillo